# Patient Record
Sex: FEMALE | Race: WHITE | NOT HISPANIC OR LATINO | Employment: OTHER | URBAN - METROPOLITAN AREA
[De-identification: names, ages, dates, MRNs, and addresses within clinical notes are randomized per-mention and may not be internally consistent; named-entity substitution may affect disease eponyms.]

---

## 2017-09-20 ENCOUNTER — TRANSCRIBE ORDERS (OUTPATIENT)
Dept: ADMINISTRATIVE | Facility: HOSPITAL | Age: 72
End: 2017-09-20

## 2017-09-20 DIAGNOSIS — Z12.31 VISIT FOR SCREENING MAMMOGRAM: Primary | ICD-10-CM

## 2017-09-28 ENCOUNTER — HOSPITAL ENCOUNTER (INPATIENT)
Facility: HOSPITAL | Age: 72
LOS: 5 days | Discharge: HOME | End: 2017-10-03
Attending: EMERGENCY MEDICINE | Admitting: INTERNAL MEDICINE

## 2017-09-28 ENCOUNTER — APPOINTMENT (OUTPATIENT)
Dept: GENERAL RADIOLOGY | Facility: HOSPITAL | Age: 72
End: 2017-09-28

## 2017-09-28 DIAGNOSIS — A41.9 SEPSIS, DUE TO UNSPECIFIED ORGANISM: Primary | ICD-10-CM

## 2017-09-28 PROBLEM — R77.8 ELEVATED TROPONIN: Status: ACTIVE | Noted: 2017-09-28

## 2017-09-28 PROBLEM — N39.0 UTI (URINARY TRACT INFECTION): Status: ACTIVE | Noted: 2017-09-28

## 2017-09-28 PROBLEM — R79.89 ELEVATED TROPONIN: Status: ACTIVE | Noted: 2017-09-28

## 2017-09-28 PROBLEM — R65.20 SEVERE SEPSIS (HCC): Status: ACTIVE | Noted: 2017-09-28

## 2017-09-28 PROBLEM — E11.9 DIABETES MELLITUS (HCC): Status: ACTIVE | Noted: 2017-09-28

## 2017-09-28 LAB
ALBUMIN SERPL-MCNC: 4.2 G/DL (ref 3.2–4.8)
ALBUMIN/GLOB SERPL: 1.7 G/DL (ref 1.5–2.5)
ALP SERPL-CCNC: 56 U/L (ref 25–100)
ALT SERPL W P-5'-P-CCNC: 16 U/L (ref 7–40)
AMORPH URATE CRY URNS QL MICRO: ABNORMAL /HPF
ANION GAP SERPL CALCULATED.3IONS-SCNC: 7 MMOL/L (ref 3–11)
AST SERPL-CCNC: 21 U/L (ref 0–33)
BACTERIA UR QL AUTO: ABNORMAL /HPF
BASOPHILS # BLD AUTO: 0.01 10*3/MM3 (ref 0–0.2)
BASOPHILS NFR BLD AUTO: 0.1 % (ref 0–1)
BILIRUB SERPL-MCNC: 0.7 MG/DL (ref 0.3–1.2)
BILIRUB UR QL STRIP: NEGATIVE
BUN BLD-MCNC: 16 MG/DL (ref 9–23)
BUN/CREAT SERPL: 16 (ref 7–25)
CALCIUM SPEC-SCNC: 9.1 MG/DL (ref 8.7–10.4)
CHLORIDE SERPL-SCNC: 101 MMOL/L (ref 99–109)
CLARITY UR: ABNORMAL
CO2 SERPL-SCNC: 28 MMOL/L (ref 20–31)
COLOR UR: YELLOW
CREAT BLD-MCNC: 1 MG/DL (ref 0.6–1.3)
D-LACTATE SERPL-SCNC: 1.4 MMOL/L (ref 0.5–2)
DEPRECATED RDW RBC AUTO: 40.4 FL (ref 37–54)
EOSINOPHIL # BLD AUTO: 0 10*3/MM3 (ref 0–0.3)
EOSINOPHIL NFR BLD AUTO: 0 % (ref 0–3)
ERYTHROCYTE [DISTWIDTH] IN BLOOD BY AUTOMATED COUNT: 13 % (ref 11.3–14.5)
FLUAV AG NPH QL: NEGATIVE
FLUBV AG NPH QL IA: NEGATIVE
GFR SERPL CREATININE-BSD FRML MDRD: 55 ML/MIN/1.73
GLOBULIN UR ELPH-MCNC: 2.5 GM/DL
GLUCOSE BLD-MCNC: 200 MG/DL (ref 70–100)
GLUCOSE BLDC GLUCOMTR-MCNC: 266 MG/DL (ref 70–130)
GLUCOSE UR STRIP-MCNC: ABNORMAL MG/DL
HCT VFR BLD AUTO: 35.8 % (ref 34.5–44)
HGB BLD-MCNC: 12.2 G/DL (ref 11.5–15.5)
HGB UR QL STRIP.AUTO: ABNORMAL
HOLD SPECIMEN: NORMAL
HOLD SPECIMEN: NORMAL
HYALINE CASTS UR QL AUTO: ABNORMAL /LPF
IMM GRANULOCYTES # BLD: 0.03 10*3/MM3 (ref 0–0.03)
IMM GRANULOCYTES NFR BLD: 0.3 % (ref 0–0.6)
KETONES UR QL STRIP: ABNORMAL
LEUKOCYTE ESTERASE UR QL STRIP.AUTO: NEGATIVE
LIPASE SERPL-CCNC: 29 U/L (ref 6–51)
LYMPHOCYTES # BLD AUTO: 0.16 10*3/MM3 (ref 0.6–4.8)
LYMPHOCYTES NFR BLD AUTO: 1.4 % (ref 24–44)
MCH RBC QN AUTO: 29 PG (ref 27–31)
MCHC RBC AUTO-ENTMCNC: 34.1 G/DL (ref 32–36)
MCV RBC AUTO: 85 FL (ref 80–99)
MONOCYTES # BLD AUTO: 0.19 10*3/MM3 (ref 0–1)
MONOCYTES NFR BLD AUTO: 1.6 % (ref 0–12)
MUCOUS THREADS URNS QL MICRO: ABNORMAL /HPF
NEUTROPHILS # BLD AUTO: 11.39 10*3/MM3 (ref 1.5–8.3)
NEUTROPHILS NFR BLD AUTO: 96.6 % (ref 41–71)
NITRITE UR QL STRIP: NEGATIVE
PH UR STRIP.AUTO: 7.5 [PH] (ref 5–8)
PLATELET # BLD AUTO: 120 10*3/MM3 (ref 150–450)
PMV BLD AUTO: 10.2 FL (ref 6–12)
POTASSIUM BLD-SCNC: 3 MMOL/L (ref 3.5–5.5)
PROCALCITONIN SERPL-MCNC: 24.84 NG/ML
PROT SERPL-MCNC: 6.7 G/DL (ref 5.7–8.2)
PROT UR QL STRIP: ABNORMAL
RBC # BLD AUTO: 4.21 10*6/MM3 (ref 3.89–5.14)
RBC # UR: ABNORMAL /HPF
REF LAB TEST METHOD: ABNORMAL
SODIUM BLD-SCNC: 136 MMOL/L (ref 132–146)
SP GR UR STRIP: 1.01 (ref 1–1.03)
SQUAMOUS #/AREA URNS HPF: ABNORMAL /HPF
TROPONIN I SERPL-MCNC: 0.13 NG/ML (ref 0–0.07)
UROBILINOGEN UR QL STRIP: ABNORMAL
WBC NRBC COR # BLD: 11.78 10*3/MM3 (ref 3.5–10.8)
WBC UR QL AUTO: ABNORMAL /HPF
WHOLE BLOOD HOLD SPECIMEN: NORMAL
WHOLE BLOOD HOLD SPECIMEN: NORMAL

## 2017-09-28 PROCEDURE — 25010000002 VANCOMYCIN 10 G RECONSTITUTED SOLUTION: Performed by: EMERGENCY MEDICINE

## 2017-09-28 PROCEDURE — 25010000002 ONDANSETRON PER 1 MG: Performed by: EMERGENCY MEDICINE

## 2017-09-28 PROCEDURE — 93005 ELECTROCARDIOGRAM TRACING: CPT | Performed by: EMERGENCY MEDICINE

## 2017-09-28 PROCEDURE — 80053 COMPREHEN METABOLIC PANEL: CPT | Performed by: EMERGENCY MEDICINE

## 2017-09-28 PROCEDURE — 87086 URINE CULTURE/COLONY COUNT: CPT | Performed by: EMERGENCY MEDICINE

## 2017-09-28 PROCEDURE — 25010000002 KETOROLAC TROMETHAMINE PER 15 MG: Performed by: EMERGENCY MEDICINE

## 2017-09-28 PROCEDURE — 81001 URINALYSIS AUTO W/SCOPE: CPT | Performed by: EMERGENCY MEDICINE

## 2017-09-28 PROCEDURE — 83605 ASSAY OF LACTIC ACID: CPT | Performed by: EMERGENCY MEDICINE

## 2017-09-28 PROCEDURE — 87150 DNA/RNA AMPLIFIED PROBE: CPT | Performed by: EMERGENCY MEDICINE

## 2017-09-28 PROCEDURE — 25010000002 MORPHINE SULFATE (PF) 2 MG/ML SOLUTION: Performed by: EMERGENCY MEDICINE

## 2017-09-28 PROCEDURE — 99285 EMERGENCY DEPT VISIT HI MDM: CPT

## 2017-09-28 PROCEDURE — 99223 1ST HOSP IP/OBS HIGH 75: CPT | Performed by: HOSPITALIST

## 2017-09-28 PROCEDURE — 87077 CULTURE AEROBIC IDENTIFY: CPT | Performed by: EMERGENCY MEDICINE

## 2017-09-28 PROCEDURE — 25010000002 HEPARIN (PORCINE) PER 1000 UNITS: Performed by: HOSPITALIST

## 2017-09-28 PROCEDURE — 82962 GLUCOSE BLOOD TEST: CPT

## 2017-09-28 PROCEDURE — 25010000002 CEFTRIAXONE PER 250 MG: Performed by: EMERGENCY MEDICINE

## 2017-09-28 PROCEDURE — 87186 SC STD MICRODIL/AGAR DIL: CPT | Performed by: EMERGENCY MEDICINE

## 2017-09-28 PROCEDURE — 85025 COMPLETE CBC W/AUTO DIFF WBC: CPT | Performed by: EMERGENCY MEDICINE

## 2017-09-28 PROCEDURE — 63710000001 INSULIN LISPRO (HUMAN) PER 5 UNITS: Performed by: HOSPITALIST

## 2017-09-28 PROCEDURE — 87040 BLOOD CULTURE FOR BACTERIA: CPT | Performed by: EMERGENCY MEDICINE

## 2017-09-28 PROCEDURE — 84145 PROCALCITONIN (PCT): CPT | Performed by: EMERGENCY MEDICINE

## 2017-09-28 PROCEDURE — 71010 HC CHEST PA OR AP: CPT

## 2017-09-28 PROCEDURE — 87804 INFLUENZA ASSAY W/OPTIC: CPT | Performed by: EMERGENCY MEDICINE

## 2017-09-28 PROCEDURE — 83690 ASSAY OF LIPASE: CPT | Performed by: EMERGENCY MEDICINE

## 2017-09-28 PROCEDURE — 84484 ASSAY OF TROPONIN QUANT: CPT

## 2017-09-28 RX ORDER — CEFTRIAXONE SODIUM 1 G/50ML
1 INJECTION, SOLUTION INTRAVENOUS EVERY 24 HOURS
Status: DISCONTINUED | OUTPATIENT
Start: 2017-09-29 | End: 2017-09-29

## 2017-09-28 RX ORDER — CEFTRIAXONE SODIUM 1 G/50ML
1 INJECTION, SOLUTION INTRAVENOUS ONCE
Status: COMPLETED | OUTPATIENT
Start: 2017-09-28 | End: 2017-09-28

## 2017-09-28 RX ORDER — ACETAMINOPHEN 325 MG/1
650 TABLET ORAL ONCE
Status: COMPLETED | OUTPATIENT
Start: 2017-09-28 | End: 2017-09-28

## 2017-09-28 RX ORDER — KETOROLAC TROMETHAMINE 15 MG/ML
7.5 INJECTION, SOLUTION INTRAMUSCULAR; INTRAVENOUS ONCE
Status: COMPLETED | OUTPATIENT
Start: 2017-09-28 | End: 2017-09-28

## 2017-09-28 RX ORDER — SODIUM CHLORIDE 9 MG/ML
150 INJECTION, SOLUTION INTRAVENOUS CONTINUOUS
Status: DISCONTINUED | OUTPATIENT
Start: 2017-09-28 | End: 2017-09-28 | Stop reason: SDUPTHER

## 2017-09-28 RX ORDER — DEXTROSE MONOHYDRATE 25 G/50ML
25 INJECTION, SOLUTION INTRAVENOUS
Status: DISCONTINUED | OUTPATIENT
Start: 2017-09-28 | End: 2017-10-03 | Stop reason: HOSPADM

## 2017-09-28 RX ORDER — ACETAMINOPHEN 325 MG/1
650 TABLET ORAL EVERY 4 HOURS PRN
Status: DISCONTINUED | OUTPATIENT
Start: 2017-09-28 | End: 2017-10-03 | Stop reason: HOSPADM

## 2017-09-28 RX ORDER — SODIUM CHLORIDE 0.9 % (FLUSH) 0.9 %
10 SYRINGE (ML) INJECTION AS NEEDED
Status: DISCONTINUED | OUTPATIENT
Start: 2017-09-28 | End: 2017-10-03 | Stop reason: HOSPADM

## 2017-09-28 RX ORDER — SODIUM CHLORIDE 0.9 % (FLUSH) 0.9 %
1-10 SYRINGE (ML) INJECTION AS NEEDED
Status: DISCONTINUED | OUTPATIENT
Start: 2017-09-28 | End: 2017-10-03 | Stop reason: HOSPADM

## 2017-09-28 RX ORDER — ONDANSETRON 4 MG/1
4 TABLET, FILM COATED ORAL EVERY 6 HOURS PRN
Status: DISCONTINUED | OUTPATIENT
Start: 2017-09-28 | End: 2017-10-03 | Stop reason: HOSPADM

## 2017-09-28 RX ORDER — SODIUM PHOSPHATE,MONO-DIBASIC 19G-7G/118
1000 ENEMA (ML) RECTAL DAILY
COMMUNITY
End: 2022-03-16

## 2017-09-28 RX ORDER — VANCOMYCIN/0.9 % SOD CHLORIDE 1.5G/250ML
20 PLASTIC BAG, INJECTION (ML) INTRAVENOUS ONCE
Status: COMPLETED | OUTPATIENT
Start: 2017-09-28 | End: 2017-09-28

## 2017-09-28 RX ORDER — HYDROCODONE BITARTRATE AND ACETAMINOPHEN 5; 325 MG/1; MG/1
1 TABLET ORAL EVERY 6 HOURS PRN
Status: DISCONTINUED | OUTPATIENT
Start: 2017-09-28 | End: 2017-10-03 | Stop reason: HOSPADM

## 2017-09-28 RX ORDER — HEPARIN SODIUM 5000 [USP'U]/ML
5000 INJECTION, SOLUTION INTRAVENOUS; SUBCUTANEOUS EVERY 12 HOURS SCHEDULED
Status: DISCONTINUED | OUTPATIENT
Start: 2017-09-28 | End: 2017-09-29

## 2017-09-28 RX ORDER — NICOTINE POLACRILEX 4 MG
15 LOZENGE BUCCAL
Status: DISCONTINUED | OUTPATIENT
Start: 2017-09-28 | End: 2017-10-03 | Stop reason: HOSPADM

## 2017-09-28 RX ORDER — MORPHINE SULFATE 2 MG/ML
2 INJECTION, SOLUTION INTRAMUSCULAR; INTRAVENOUS ONCE
Status: COMPLETED | OUTPATIENT
Start: 2017-09-28 | End: 2017-09-28

## 2017-09-28 RX ORDER — LISINOPRIL 10 MG/1
10 TABLET ORAL EVERY MORNING
COMMUNITY
End: 2018-04-30 | Stop reason: ALTCHOICE

## 2017-09-28 RX ORDER — MORPHINE SULFATE 2 MG/ML
1 INJECTION, SOLUTION INTRAMUSCULAR; INTRAVENOUS EVERY 4 HOURS PRN
Status: DISCONTINUED | OUTPATIENT
Start: 2017-09-28 | End: 2017-10-03 | Stop reason: HOSPADM

## 2017-09-28 RX ORDER — ONDANSETRON 2 MG/ML
4 INJECTION INTRAMUSCULAR; INTRAVENOUS ONCE
Status: COMPLETED | OUTPATIENT
Start: 2017-09-28 | End: 2017-09-28

## 2017-09-28 RX ORDER — SODIUM CHLORIDE 9 MG/ML
100 INJECTION, SOLUTION INTRAVENOUS CONTINUOUS
Status: DISCONTINUED | OUTPATIENT
Start: 2017-09-28 | End: 2017-09-29

## 2017-09-28 RX ORDER — CHLORAL HYDRATE 500 MG
2000 CAPSULE ORAL
COMMUNITY

## 2017-09-28 RX ORDER — ONDANSETRON 2 MG/ML
4 INJECTION INTRAMUSCULAR; INTRAVENOUS EVERY 6 HOURS PRN
Status: DISCONTINUED | OUTPATIENT
Start: 2017-09-28 | End: 2017-10-03 | Stop reason: HOSPADM

## 2017-09-28 RX ADMIN — SODIUM CHLORIDE 1000 ML: 9 INJECTION, SOLUTION INTRAVENOUS at 16:48

## 2017-09-28 RX ADMIN — HEPARIN SODIUM 5000 UNITS: 5000 INJECTION, SOLUTION INTRAVENOUS; SUBCUTANEOUS at 21:54

## 2017-09-28 RX ADMIN — SODIUM CHLORIDE 150 ML/HR: 9 INJECTION, SOLUTION INTRAVENOUS at 19:52

## 2017-09-28 RX ADMIN — CEFTRIAXONE SODIUM 1 G: 1 INJECTION, SOLUTION INTRAVENOUS at 17:46

## 2017-09-28 RX ADMIN — MORPHINE SULFATE 2 MG: 2 INJECTION, SOLUTION INTRAMUSCULAR; INTRAVENOUS at 16:51

## 2017-09-28 RX ADMIN — ONDANSETRON 4 MG: 2 INJECTION INTRAMUSCULAR; INTRAVENOUS at 16:51

## 2017-09-28 RX ADMIN — KETOROLAC TROMETHAMINE 7.5 MG: 15 INJECTION, SOLUTION INTRAMUSCULAR; INTRAVENOUS at 16:48

## 2017-09-28 RX ADMIN — CEFTRIAXONE SODIUM 1 G: 1 INJECTION, SOLUTION INTRAVENOUS at 18:58

## 2017-09-28 RX ADMIN — VANCOMYCIN HYDROCHLORIDE 1500 MG: 10 INJECTION, POWDER, LYOPHILIZED, FOR SOLUTION INTRAVENOUS at 19:05

## 2017-09-28 RX ADMIN — HYDROCODONE BITARTRATE AND ACETAMINOPHEN 1 TABLET: 5; 325 TABLET ORAL at 23:27

## 2017-09-28 RX ADMIN — SODIUM CHLORIDE 100 ML/HR: 9 INJECTION, SOLUTION INTRAVENOUS at 21:50

## 2017-09-28 RX ADMIN — ACETAMINOPHEN 650 MG: 325 TABLET, FILM COATED ORAL at 17:13

## 2017-09-28 RX ADMIN — SODIUM CHLORIDE 1000 ML: 9 INJECTION, SOLUTION INTRAVENOUS at 18:58

## 2017-09-28 RX ADMIN — INSULIN LISPRO 4 UNITS: 100 INJECTION, SOLUTION INTRAVENOUS; SUBCUTANEOUS at 22:27

## 2017-09-29 ENCOUNTER — APPOINTMENT (OUTPATIENT)
Dept: CT IMAGING | Facility: HOSPITAL | Age: 72
End: 2017-09-29

## 2017-09-29 ENCOUNTER — APPOINTMENT (OUTPATIENT)
Dept: CARDIOLOGY | Facility: HOSPITAL | Age: 72
End: 2017-09-29
Attending: HOSPITALIST

## 2017-09-29 LAB
ALBUMIN SERPL-MCNC: 3.2 G/DL (ref 3.2–4.8)
ALBUMIN/GLOB SERPL: 1.5 G/DL (ref 1.5–2.5)
ALP SERPL-CCNC: 41 U/L (ref 25–100)
ALT SERPL W P-5'-P-CCNC: 16 U/L (ref 7–40)
ANION GAP SERPL CALCULATED.3IONS-SCNC: 8 MMOL/L (ref 3–11)
APTT PPP: 30 SECONDS (ref 45–60)
APTT PPP: 35.5 SECONDS (ref 45–60)
APTT PPP: 48 SECONDS (ref 45–60)
ARTICHOKE IGE QN: 68 MG/DL (ref 0–130)
AST SERPL-CCNC: 25 U/L (ref 0–33)
BACTERIA BLD CULT: ABNORMAL
BH CV ECHO MEAS - AO ROOT AREA (BSA CORRECTED): 1.6
BH CV ECHO MEAS - AO ROOT AREA: 6.8 CM^2
BH CV ECHO MEAS - AO ROOT DIAM: 2.9 CM
BH CV ECHO MEAS - BSA(HAYCOCK): 1.9 M^2
BH CV ECHO MEAS - BSA: 1.8 M^2
BH CV ECHO MEAS - BZI_BMI: 28 KILOGRAMS/M^2
BH CV ECHO MEAS - BZI_METRIC_HEIGHT: 165.1 CM
BH CV ECHO MEAS - BZI_METRIC_WEIGHT: 76.2 KG
BH CV ECHO MEAS - CONTRAST EF 4CH: 54.8 ML/M^2
BH CV ECHO MEAS - EDV(CUBED): 58.3 ML
BH CV ECHO MEAS - EDV(MOD-SP4): 93 ML
BH CV ECHO MEAS - EDV(TEICH): 65 ML
BH CV ECHO MEAS - EF(CUBED): 69.6 %
BH CV ECHO MEAS - EF(MOD-SP4): 55 %
BH CV ECHO MEAS - EF(TEICH): 61.9 %
BH CV ECHO MEAS - ESV(CUBED): 17.7 ML
BH CV ECHO MEAS - ESV(MOD-SP4): 42 ML
BH CV ECHO MEAS - ESV(TEICH): 24.8 ML
BH CV ECHO MEAS - FS: 32.7 %
BH CV ECHO MEAS - IVS/LVPW: 1.5
BH CV ECHO MEAS - IVSD: 1.6 CM
BH CV ECHO MEAS - LA DIMENSION: 3.3 CM
BH CV ECHO MEAS - LA/AO: 1.1
BH CV ECHO MEAS - LAT PEAK E' VEL: 6.7 CM/SEC
BH CV ECHO MEAS - LV DIASTOLIC VOL/BSA (35-75): 50.6 ML/M^2
BH CV ECHO MEAS - LV MASS(C)D: 185.7 GRAMS
BH CV ECHO MEAS - LV MASS(C)DI: 101.1 GRAMS/M^2
BH CV ECHO MEAS - LV MAX PG: 6.2 MMHG
BH CV ECHO MEAS - LV MEAN PG: 2.9 MMHG
BH CV ECHO MEAS - LV SYSTOLIC VOL/BSA (12-30): 22.9 ML/M^2
BH CV ECHO MEAS - LV V1 MAX: 124.4 CM/SEC
BH CV ECHO MEAS - LV V1 MEAN: 76.6 CM/SEC
BH CV ECHO MEAS - LV V1 VTI: 28.3 CM
BH CV ECHO MEAS - LVIDD: 3.9 CM
BH CV ECHO MEAS - LVIDS: 2.6 CM
BH CV ECHO MEAS - LVLD AP4: 7.7 CM
BH CV ECHO MEAS - LVLS AP4: 6.2 CM
BH CV ECHO MEAS - LVOT AREA (M): 2.5 CM^2
BH CV ECHO MEAS - LVOT AREA: 2.6 CM^2
BH CV ECHO MEAS - LVOT DIAM: 1.8 CM
BH CV ECHO MEAS - LVPWD: 1.1 CM
BH CV ECHO MEAS - MED PEAK E' VEL: 7.17 CM/SEC
BH CV ECHO MEAS - MV A MAX VEL: 94.3 CM/SEC
BH CV ECHO MEAS - MV E MAX VEL: 97.7 CM/SEC
BH CV ECHO MEAS - MV E/A: 1
BH CV ECHO MEAS - PA ACC SLOPE: 732.5 CM/SEC^2
BH CV ECHO MEAS - PA ACC TIME: 0.1 SEC
BH CV ECHO MEAS - PA PR(ACCEL): 35 MMHG
BH CV ECHO MEAS - RAP SYSTOLE: 15 MMHG
BH CV ECHO MEAS - RVDD: 2.7 CM
BH CV ECHO MEAS - RVSP: 38 MMHG
BH CV ECHO MEAS - SI(CUBED): 22.1 ML/M^2
BH CV ECHO MEAS - SI(LVOT): 39.9 ML/M^2
BH CV ECHO MEAS - SI(MOD-SP4): 27.8 ML/M^2
BH CV ECHO MEAS - SI(TEICH): 21.9 ML/M^2
BH CV ECHO MEAS - SV(CUBED): 40.6 ML
BH CV ECHO MEAS - SV(LVOT): 73.3 ML
BH CV ECHO MEAS - SV(MOD-SP4): 51 ML
BH CV ECHO MEAS - SV(TEICH): 40.2 ML
BH CV ECHO MEAS - TAPSE (>1.6): 1.6 CM2
BH CV ECHO MEAS - TR MAX VEL: 241.5 CM/SEC
BH CV VAS BP RIGHT ARM: NORMAL MMHG
BH CV XLRA - RV BASE: 3.3 CM
BH CV XLRA - RV LENGTH: 6.5 CM
BH CV XLRA - RV MID: 2.6 CM
BH CV XLRA - TDI S': 12.7 CM/SEC
BILIRUB SERPL-MCNC: 0.3 MG/DL (ref 0.3–1.2)
BUN BLD-MCNC: 17 MG/DL (ref 9–23)
BUN/CREAT SERPL: 15.5 (ref 7–25)
CALCIUM SPEC-SCNC: 7.4 MG/DL (ref 8.7–10.4)
CHLORIDE SERPL-SCNC: 108 MMOL/L (ref 99–109)
CHOLEST SERPL-MCNC: 152 MG/DL (ref 0–200)
CK MB SERPL-CCNC: 2.9 NG/ML (ref 0–5)
CK SERPL-CCNC: 114 U/L (ref 26–174)
CO2 SERPL-SCNC: 21 MMOL/L (ref 20–31)
CREAT BLD-MCNC: 1.1 MG/DL (ref 0.6–1.3)
D-LACTATE SERPL-SCNC: 0.9 MMOL/L (ref 0.5–2)
DEPRECATED RDW RBC AUTO: 44.1 FL (ref 37–54)
E/E' RATIO: 14
ERYTHROCYTE [DISTWIDTH] IN BLOOD BY AUTOMATED COUNT: 13.8 % (ref 11.3–14.5)
GFR SERPL CREATININE-BSD FRML MDRD: 49 ML/MIN/1.73
GLOBULIN UR ELPH-MCNC: 2.2 GM/DL
GLUCOSE BLD-MCNC: 200 MG/DL (ref 70–100)
GLUCOSE BLDC GLUCOMTR-MCNC: 145 MG/DL (ref 70–130)
GLUCOSE BLDC GLUCOMTR-MCNC: 165 MG/DL (ref 70–130)
GLUCOSE BLDC GLUCOMTR-MCNC: 191 MG/DL (ref 70–130)
GLUCOSE BLDC GLUCOMTR-MCNC: 193 MG/DL (ref 70–130)
HBA1C MFR BLD: 6.9 % (ref 4.8–5.6)
HCT VFR BLD AUTO: 29.8 % (ref 34.5–44)
HDLC SERPL-MCNC: 50 MG/DL (ref 40–60)
HGB BLD-MCNC: 9.9 G/DL (ref 11.5–15.5)
INR PPP: 1.07
LEFT ATRIUM VOLUME INDEX: 29.3 ML/M2
MCH RBC QN AUTO: 28.9 PG (ref 27–31)
MCHC RBC AUTO-ENTMCNC: 33.2 G/DL (ref 32–36)
MCV RBC AUTO: 86.9 FL (ref 80–99)
PLATELET # BLD AUTO: 108 10*3/MM3 (ref 150–450)
PMV BLD AUTO: 11 FL (ref 6–12)
POTASSIUM BLD-SCNC: 3.6 MMOL/L (ref 3.5–5.5)
PROT SERPL-MCNC: 5.4 G/DL (ref 5.7–8.2)
PROTHROMBIN TIME: 11.7 SECONDS (ref 9.6–11.5)
RBC # BLD AUTO: 3.43 10*6/MM3 (ref 3.89–5.14)
SODIUM BLD-SCNC: 137 MMOL/L (ref 132–146)
TRIGL SERPL-MCNC: 108 MG/DL (ref 0–150)
TROPONIN I SERPL-MCNC: 1.43 NG/ML
TROPONIN I SERPL-MCNC: 1.48 NG/ML
WBC NRBC COR # BLD: 8.63 10*3/MM3 (ref 3.5–10.8)

## 2017-09-29 PROCEDURE — 25010000003 CEFTRIAXONE PER 250 MG: Performed by: PHYSICIAN ASSISTANT

## 2017-09-29 PROCEDURE — 93010 ELECTROCARDIOGRAM REPORT: CPT | Performed by: INTERNAL MEDICINE

## 2017-09-29 PROCEDURE — 83605 ASSAY OF LACTIC ACID: CPT | Performed by: HOSPITALIST

## 2017-09-29 PROCEDURE — 93306 TTE W/DOPPLER COMPLETE: CPT | Performed by: INTERNAL MEDICINE

## 2017-09-29 PROCEDURE — 99222 1ST HOSP IP/OBS MODERATE 55: CPT | Performed by: INTERNAL MEDICINE

## 2017-09-29 PROCEDURE — 83036 HEMOGLOBIN GLYCOSYLATED A1C: CPT | Performed by: HOSPITALIST

## 2017-09-29 PROCEDURE — 84484 ASSAY OF TROPONIN QUANT: CPT | Performed by: PHYSICIAN ASSISTANT

## 2017-09-29 PROCEDURE — 80061 LIPID PANEL: CPT | Performed by: PHYSICIAN ASSISTANT

## 2017-09-29 PROCEDURE — 25010000002 HEPARIN (PORCINE) PER 1000 UNITS

## 2017-09-29 PROCEDURE — 99233 SBSQ HOSP IP/OBS HIGH 50: CPT | Performed by: HOSPITALIST

## 2017-09-29 PROCEDURE — 74176 CT ABD & PELVIS W/O CONTRAST: CPT

## 2017-09-29 PROCEDURE — 84484 ASSAY OF TROPONIN QUANT: CPT | Performed by: HOSPITALIST

## 2017-09-29 PROCEDURE — 82550 ASSAY OF CK (CPK): CPT | Performed by: HOSPITALIST

## 2017-09-29 PROCEDURE — 82553 CREATINE MB FRACTION: CPT | Performed by: HOSPITALIST

## 2017-09-29 PROCEDURE — 93306 TTE W/DOPPLER COMPLETE: CPT

## 2017-09-29 PROCEDURE — 80053 COMPREHEN METABOLIC PANEL: CPT | Performed by: HOSPITALIST

## 2017-09-29 PROCEDURE — 85730 THROMBOPLASTIN TIME PARTIAL: CPT

## 2017-09-29 PROCEDURE — 93005 ELECTROCARDIOGRAM TRACING: CPT | Performed by: HOSPITALIST

## 2017-09-29 PROCEDURE — 85610 PROTHROMBIN TIME: CPT

## 2017-09-29 PROCEDURE — 85027 COMPLETE CBC AUTOMATED: CPT | Performed by: HOSPITALIST

## 2017-09-29 PROCEDURE — 25010000002 MORPHINE SULFATE (PF) 2 MG/ML SOLUTION: Performed by: HOSPITALIST

## 2017-09-29 PROCEDURE — 82962 GLUCOSE BLOOD TEST: CPT

## 2017-09-29 RX ORDER — HEPARIN SODIUM 10000 [USP'U]/100ML
12 INJECTION, SOLUTION INTRAVENOUS
Status: DISCONTINUED | OUTPATIENT
Start: 2017-09-29 | End: 2017-09-30

## 2017-09-29 RX ORDER — HEPARIN SODIUM 1000 [USP'U]/ML
2000 INJECTION, SOLUTION INTRAVENOUS; SUBCUTANEOUS ONCE
Status: COMPLETED | OUTPATIENT
Start: 2017-09-29 | End: 2017-09-29

## 2017-09-29 RX ORDER — ASCORBIC ACID 500 MG
1000 TABLET ORAL DAILY
COMMUNITY
End: 2021-08-11

## 2017-09-29 RX ORDER — CEFTRIAXONE SODIUM 2 G/50ML
2 INJECTION, SOLUTION INTRAVENOUS EVERY 24 HOURS
Status: DISCONTINUED | OUTPATIENT
Start: 2017-09-29 | End: 2017-10-03 | Stop reason: HOSPADM

## 2017-09-29 RX ORDER — ASPIRIN 81 MG/1
81 TABLET ORAL EVERY MORNING
COMMUNITY

## 2017-09-29 RX ORDER — ASPIRIN 81 MG/1
324 TABLET, CHEWABLE ORAL ONCE
Status: COMPLETED | OUTPATIENT
Start: 2017-09-29 | End: 2017-09-29

## 2017-09-29 RX ORDER — CLOPIDOGREL BISULFATE 75 MG/1
600 TABLET ORAL ONCE
Status: COMPLETED | OUTPATIENT
Start: 2017-09-29 | End: 2017-09-29

## 2017-09-29 RX ORDER — MELOXICAM 7.5 MG/1
7.5 TABLET ORAL EVERY 12 HOURS
COMMUNITY
End: 2017-10-03 | Stop reason: HOSPADM

## 2017-09-29 RX ORDER — ESTRADIOL 1 MG/1
TABLET ORAL DAILY
COMMUNITY
End: 2017-10-03 | Stop reason: HOSPADM

## 2017-09-29 RX ORDER — ATORVASTATIN CALCIUM 40 MG/1
40 TABLET, FILM COATED ORAL NIGHTLY
Status: DISCONTINUED | OUTPATIENT
Start: 2017-09-29 | End: 2017-10-02

## 2017-09-29 RX ORDER — SODIUM CHLORIDE 9 MG/ML
150 INJECTION, SOLUTION INTRAVENOUS CONTINUOUS
Status: DISCONTINUED | OUTPATIENT
Start: 2017-09-29 | End: 2017-09-30

## 2017-09-29 RX ADMIN — INSULIN LISPRO 2 UNITS: 100 INJECTION, SOLUTION INTRAVENOUS; SUBCUTANEOUS at 19:38

## 2017-09-29 RX ADMIN — MORPHINE SULFATE 1 MG: 2 INJECTION, SOLUTION INTRAMUSCULAR; INTRAVENOUS at 21:29

## 2017-09-29 RX ADMIN — ATORVASTATIN CALCIUM 40 MG: 40 TABLET, FILM COATED ORAL at 19:38

## 2017-09-29 RX ADMIN — INSULIN LISPRO 2 UNITS: 100 INJECTION, SOLUTION INTRAVENOUS; SUBCUTANEOUS at 17:04

## 2017-09-29 RX ADMIN — HEPARIN SODIUM 2000 UNITS: 1000 INJECTION, SOLUTION INTRAVENOUS; SUBCUTANEOUS at 15:27

## 2017-09-29 RX ADMIN — SODIUM CHLORIDE 500 ML: 9 INJECTION, SOLUTION INTRAVENOUS at 10:45

## 2017-09-29 RX ADMIN — INSULIN LISPRO 2 UNITS: 100 INJECTION, SOLUTION INTRAVENOUS; SUBCUTANEOUS at 09:14

## 2017-09-29 RX ADMIN — ASPIRIN 81 MG 324 MG: 81 TABLET ORAL at 09:21

## 2017-09-29 RX ADMIN — HEPARIN SODIUM 12 UNITS/KG/HR: 10000 INJECTION, SOLUTION INTRAVENOUS at 08:33

## 2017-09-29 RX ADMIN — SODIUM CHLORIDE 1000 ML: 9 INJECTION, SOLUTION INTRAVENOUS at 01:16

## 2017-09-29 RX ADMIN — SODIUM CHLORIDE 150 ML/HR: 9 INJECTION, SOLUTION INTRAVENOUS at 22:37

## 2017-09-29 RX ADMIN — CLOPIDOGREL BISULFATE 600 MG: 75 TABLET ORAL at 09:21

## 2017-09-29 RX ADMIN — HYDROCODONE BITARTRATE AND ACETAMINOPHEN 1 TABLET: 5; 325 TABLET ORAL at 19:38

## 2017-09-29 RX ADMIN — CEFTRIAXONE SODIUM 2 G: 2 INJECTION, SOLUTION INTRAVENOUS at 13:33

## 2017-09-29 RX ADMIN — ACETAMINOPHEN 650 MG: 325 TABLET, FILM COATED ORAL at 08:33

## 2017-09-29 RX ADMIN — SODIUM CHLORIDE 150 ML/HR: 9 INJECTION, SOLUTION INTRAVENOUS at 02:22

## 2017-09-29 RX ADMIN — HYDROCODONE BITARTRATE AND ACETAMINOPHEN 1 TABLET: 5; 325 TABLET ORAL at 13:33

## 2017-09-29 RX ADMIN — HYDROCODONE BITARTRATE AND ACETAMINOPHEN 1 TABLET: 5; 325 TABLET ORAL at 05:40

## 2017-09-30 ENCOUNTER — APPOINTMENT (OUTPATIENT)
Dept: GENERAL RADIOLOGY | Facility: HOSPITAL | Age: 72
End: 2017-09-30

## 2017-09-30 ENCOUNTER — APPOINTMENT (OUTPATIENT)
Dept: CT IMAGING | Facility: HOSPITAL | Age: 72
End: 2017-09-30

## 2017-09-30 LAB
ANION GAP SERPL CALCULATED.3IONS-SCNC: 11 MMOL/L (ref 3–11)
APTT PPP: 47.5 SECONDS (ref 45–60)
BACTERIA SPEC AEROBE CULT: ABNORMAL
BACTERIA SPEC AEROBE CULT: ABNORMAL
BASOPHILS # BLD AUTO: 0.01 10*3/MM3 (ref 0–0.2)
BASOPHILS NFR BLD AUTO: 0.1 % (ref 0–1)
BNP SERPL-MCNC: 230 PG/ML (ref 0–100)
BUN BLD-MCNC: 15 MG/DL (ref 9–23)
BUN/CREAT SERPL: 18.8 (ref 7–25)
CALCIUM SPEC-SCNC: 8.3 MG/DL (ref 8.7–10.4)
CHLORIDE SERPL-SCNC: 104 MMOL/L (ref 99–109)
CO2 SERPL-SCNC: 22 MMOL/L (ref 20–31)
CREAT BLD-MCNC: 0.8 MG/DL (ref 0.6–1.3)
CRP SERPL-MCNC: 21.49 MG/DL (ref 0–1)
DEPRECATED RDW RBC AUTO: 43.6 FL (ref 37–54)
DEPRECATED RDW RBC AUTO: 43.8 FL (ref 37–54)
EOSINOPHIL # BLD AUTO: 0 10*3/MM3 (ref 0–0.3)
EOSINOPHIL NFR BLD AUTO: 0 % (ref 0–3)
ERYTHROCYTE [DISTWIDTH] IN BLOOD BY AUTOMATED COUNT: 13.8 % (ref 11.3–14.5)
ERYTHROCYTE [DISTWIDTH] IN BLOOD BY AUTOMATED COUNT: 13.9 % (ref 11.3–14.5)
ERYTHROCYTE [SEDIMENTATION RATE] IN BLOOD: 27 MM/HR (ref 0–30)
GFR SERPL CREATININE-BSD FRML MDRD: 71 ML/MIN/1.73
GLUCOSE BLD-MCNC: 162 MG/DL (ref 70–100)
GLUCOSE BLDC GLUCOMTR-MCNC: 158 MG/DL (ref 70–130)
GLUCOSE BLDC GLUCOMTR-MCNC: 160 MG/DL (ref 70–130)
GLUCOSE BLDC GLUCOMTR-MCNC: 168 MG/DL (ref 70–130)
GLUCOSE BLDC GLUCOMTR-MCNC: 173 MG/DL (ref 70–130)
HCT VFR BLD AUTO: 31.1 % (ref 34.5–44)
HCT VFR BLD AUTO: 34.4 % (ref 34.5–44)
HGB BLD-MCNC: 10.3 G/DL (ref 11.5–15.5)
HGB BLD-MCNC: 11.4 G/DL (ref 11.5–15.5)
IMM GRANULOCYTES # BLD: 0.03 10*3/MM3 (ref 0–0.03)
IMM GRANULOCYTES NFR BLD: 0.4 % (ref 0–0.6)
LYMPHOCYTES # BLD AUTO: 1.19 10*3/MM3 (ref 0.6–4.8)
LYMPHOCYTES NFR BLD AUTO: 14.9 % (ref 24–44)
MAGNESIUM SERPL-MCNC: 1.6 MG/DL (ref 1.3–2.7)
MCH RBC QN AUTO: 28.4 PG (ref 27–31)
MCH RBC QN AUTO: 28.4 PG (ref 27–31)
MCHC RBC AUTO-ENTMCNC: 33.1 G/DL (ref 32–36)
MCHC RBC AUTO-ENTMCNC: 33.1 G/DL (ref 32–36)
MCV RBC AUTO: 85.7 FL (ref 80–99)
MCV RBC AUTO: 85.8 FL (ref 80–99)
MONOCYTES # BLD AUTO: 0.54 10*3/MM3 (ref 0–1)
MONOCYTES NFR BLD AUTO: 6.7 % (ref 0–12)
NEUTROPHILS # BLD AUTO: 6.24 10*3/MM3 (ref 1.5–8.3)
NEUTROPHILS NFR BLD AUTO: 77.9 % (ref 41–71)
PLATELET # BLD AUTO: 105 10*3/MM3 (ref 150–450)
PLATELET # BLD AUTO: 91 10*3/MM3 (ref 150–450)
PMV BLD AUTO: 10.8 FL (ref 6–12)
PMV BLD AUTO: 11.4 FL (ref 6–12)
POTASSIUM BLD-SCNC: 3 MMOL/L (ref 3.5–5.5)
POTASSIUM BLD-SCNC: 3.5 MMOL/L (ref 3.5–5.5)
RBC # BLD AUTO: 3.63 10*6/MM3 (ref 3.89–5.14)
RBC # BLD AUTO: 4.01 10*6/MM3 (ref 3.89–5.14)
SODIUM BLD-SCNC: 137 MMOL/L (ref 132–146)
TROPONIN I SERPL-MCNC: 0.76 NG/ML
WBC NRBC COR # BLD: 11.68 10*3/MM3 (ref 3.5–10.8)
WBC NRBC COR # BLD: 8.01 10*3/MM3 (ref 3.5–10.8)

## 2017-09-30 PROCEDURE — 25010000003 CEFTRIAXONE PER 250 MG: Performed by: PHYSICIAN ASSISTANT

## 2017-09-30 PROCEDURE — 83735 ASSAY OF MAGNESIUM: CPT | Performed by: HOSPITALIST

## 2017-09-30 PROCEDURE — 83880 ASSAY OF NATRIURETIC PEPTIDE: CPT | Performed by: HOSPITALIST

## 2017-09-30 PROCEDURE — 99232 SBSQ HOSP IP/OBS MODERATE 35: CPT | Performed by: INTERNAL MEDICINE

## 2017-09-30 PROCEDURE — 80048 BASIC METABOLIC PNL TOTAL CA: CPT | Performed by: PHYSICIAN ASSISTANT

## 2017-09-30 PROCEDURE — 85730 THROMBOPLASTIN TIME PARTIAL: CPT | Performed by: HOSPITALIST

## 2017-09-30 PROCEDURE — 82962 GLUCOSE BLOOD TEST: CPT

## 2017-09-30 PROCEDURE — 84484 ASSAY OF TROPONIN QUANT: CPT | Performed by: HOSPITALIST

## 2017-09-30 PROCEDURE — 85025 COMPLETE CBC W/AUTO DIFF WBC: CPT

## 2017-09-30 PROCEDURE — 25010000002 MORPHINE SULFATE (PF) 2 MG/ML SOLUTION: Performed by: HOSPITALIST

## 2017-09-30 PROCEDURE — 84132 ASSAY OF SERUM POTASSIUM: CPT | Performed by: HOSPITALIST

## 2017-09-30 PROCEDURE — 85652 RBC SED RATE AUTOMATED: CPT | Performed by: PHYSICIAN ASSISTANT

## 2017-09-30 PROCEDURE — 99233 SBSQ HOSP IP/OBS HIGH 50: CPT | Performed by: HOSPITALIST

## 2017-09-30 PROCEDURE — 85027 COMPLETE CBC AUTOMATED: CPT | Performed by: HOSPITALIST

## 2017-09-30 PROCEDURE — 0 IOPAMIDOL PER 1 ML: Performed by: HOSPITALIST

## 2017-09-30 PROCEDURE — 25010000002 FUROSEMIDE PER 20 MG: Performed by: INTERNAL MEDICINE

## 2017-09-30 PROCEDURE — 71275 CT ANGIOGRAPHY CHEST: CPT

## 2017-09-30 PROCEDURE — 25010000002 FUROSEMIDE PER 20 MG: Performed by: HOSPITALIST

## 2017-09-30 PROCEDURE — 71010 HC CHEST PA OR AP: CPT

## 2017-09-30 PROCEDURE — 86140 C-REACTIVE PROTEIN: CPT | Performed by: PHYSICIAN ASSISTANT

## 2017-09-30 PROCEDURE — 25010000002 HEPARIN (PORCINE) PER 1000 UNITS: Performed by: HOSPITALIST

## 2017-09-30 RX ORDER — METOPROLOL TARTRATE 50 MG/1
50 TABLET, FILM COATED ORAL EVERY 12 HOURS SCHEDULED
Status: DISCONTINUED | OUTPATIENT
Start: 2017-09-30 | End: 2017-10-03 | Stop reason: HOSPADM

## 2017-09-30 RX ORDER — FUROSEMIDE 10 MG/ML
40 INJECTION INTRAMUSCULAR; INTRAVENOUS ONCE
Status: COMPLETED | OUTPATIENT
Start: 2017-09-30 | End: 2017-09-30

## 2017-09-30 RX ORDER — MAGNESIUM SULFATE HEPTAHYDRATE 40 MG/ML
4 INJECTION, SOLUTION INTRAVENOUS AS NEEDED
Status: DISCONTINUED | OUTPATIENT
Start: 2017-09-30 | End: 2017-10-03 | Stop reason: HOSPADM

## 2017-09-30 RX ORDER — MAGNESIUM SULFATE HEPTAHYDRATE 40 MG/ML
2 INJECTION, SOLUTION INTRAVENOUS AS NEEDED
Status: DISCONTINUED | OUTPATIENT
Start: 2017-09-30 | End: 2017-10-03 | Stop reason: HOSPADM

## 2017-09-30 RX ORDER — ASPIRIN 81 MG/1
81 TABLET ORAL DAILY
Status: DISCONTINUED | OUTPATIENT
Start: 2017-09-30 | End: 2017-10-03 | Stop reason: HOSPADM

## 2017-09-30 RX ORDER — FUROSEMIDE 10 MG/ML
20 INJECTION INTRAMUSCULAR; INTRAVENOUS EVERY 12 HOURS
Status: DISCONTINUED | OUTPATIENT
Start: 2017-09-30 | End: 2017-10-01

## 2017-09-30 RX ORDER — POTASSIUM CHLORIDE 750 MG/1
40 CAPSULE, EXTENDED RELEASE ORAL AS NEEDED
Status: DISCONTINUED | OUTPATIENT
Start: 2017-09-30 | End: 2017-10-03 | Stop reason: HOSPADM

## 2017-09-30 RX ORDER — POTASSIUM CHLORIDE 7.45 MG/ML
10 INJECTION INTRAVENOUS
Status: DISCONTINUED | OUTPATIENT
Start: 2017-09-30 | End: 2017-10-03 | Stop reason: HOSPADM

## 2017-09-30 RX ORDER — CLOPIDOGREL BISULFATE 75 MG/1
75 TABLET ORAL DAILY
Status: DISCONTINUED | OUTPATIENT
Start: 2017-09-30 | End: 2017-10-03 | Stop reason: HOSPADM

## 2017-09-30 RX ORDER — POTASSIUM CHLORIDE 1.5 G/1.77G
40 POWDER, FOR SOLUTION ORAL AS NEEDED
Status: DISCONTINUED | OUTPATIENT
Start: 2017-09-30 | End: 2017-10-03 | Stop reason: HOSPADM

## 2017-09-30 RX ADMIN — CLOPIDOGREL BISULFATE 75 MG: 75 TABLET ORAL at 11:28

## 2017-09-30 RX ADMIN — POTASSIUM CHLORIDE 40 MEQ: 750 CAPSULE, EXTENDED RELEASE ORAL at 19:05

## 2017-09-30 RX ADMIN — MORPHINE SULFATE 1 MG: 2 INJECTION, SOLUTION INTRAMUSCULAR; INTRAVENOUS at 22:02

## 2017-09-30 RX ADMIN — IOPAMIDOL 65 ML: 755 INJECTION, SOLUTION INTRAVENOUS at 13:30

## 2017-09-30 RX ADMIN — CEFTRIAXONE SODIUM 2 G: 2 INJECTION, SOLUTION INTRAVENOUS at 13:22

## 2017-09-30 RX ADMIN — INSULIN LISPRO 2 UNITS: 100 INJECTION, SOLUTION INTRAVENOUS; SUBCUTANEOUS at 08:43

## 2017-09-30 RX ADMIN — INSULIN LISPRO 2 UNITS: 100 INJECTION, SOLUTION INTRAVENOUS; SUBCUTANEOUS at 20:01

## 2017-09-30 RX ADMIN — POTASSIUM CHLORIDE 40 MEQ: 750 CAPSULE, EXTENDED RELEASE ORAL at 08:32

## 2017-09-30 RX ADMIN — MORPHINE SULFATE 1 MG: 2 INJECTION, SOLUTION INTRAMUSCULAR; INTRAVENOUS at 17:07

## 2017-09-30 RX ADMIN — METOPROLOL TARTRATE 50 MG: 50 TABLET ORAL at 20:01

## 2017-09-30 RX ADMIN — MAGNESIUM SULFATE HEPTAHYDRATE 4 G: 40 INJECTION, SOLUTION INTRAVENOUS at 15:18

## 2017-09-30 RX ADMIN — INSULIN LISPRO 2 UNITS: 100 INJECTION, SOLUTION INTRAVENOUS; SUBCUTANEOUS at 13:22

## 2017-09-30 RX ADMIN — POTASSIUM CHLORIDE 40 MEQ: 750 CAPSULE, EXTENDED RELEASE ORAL at 15:18

## 2017-09-30 RX ADMIN — MORPHINE SULFATE 1 MG: 2 INJECTION, SOLUTION INTRAMUSCULAR; INTRAVENOUS at 08:43

## 2017-09-30 RX ADMIN — INSULIN LISPRO 2 UNITS: 100 INJECTION, SOLUTION INTRAVENOUS; SUBCUTANEOUS at 17:08

## 2017-09-30 RX ADMIN — FUROSEMIDE 40 MG: 10 INJECTION, SOLUTION INTRAMUSCULAR; INTRAVENOUS at 05:12

## 2017-09-30 RX ADMIN — METOPROLOL TARTRATE 50 MG: 50 TABLET ORAL at 08:31

## 2017-09-30 RX ADMIN — ASPIRIN 81 MG: 81 TABLET, COATED ORAL at 11:28

## 2017-09-30 RX ADMIN — FUROSEMIDE 40 MG: 10 INJECTION, SOLUTION INTRAMUSCULAR; INTRAVENOUS at 14:42

## 2017-09-30 RX ADMIN — ATORVASTATIN CALCIUM 40 MG: 40 TABLET, FILM COATED ORAL at 20:01

## 2017-09-30 RX ADMIN — FUROSEMIDE 20 MG: 10 INJECTION, SOLUTION INTRAMUSCULAR; INTRAVENOUS at 19:43

## 2017-09-30 RX ADMIN — MORPHINE SULFATE 1 MG: 2 INJECTION, SOLUTION INTRAMUSCULAR; INTRAVENOUS at 04:07

## 2017-10-01 LAB
ANION GAP SERPL CALCULATED.3IONS-SCNC: 8 MMOL/L (ref 3–11)
BACTERIA SPEC AEROBE CULT: ABNORMAL
BACTERIA SPEC AEROBE CULT: ABNORMAL
BNP SERPL-MCNC: 258 PG/ML (ref 0–100)
BUN BLD-MCNC: 14 MG/DL (ref 9–23)
BUN/CREAT SERPL: 23.3 (ref 7–25)
CALCIUM SPEC-SCNC: 8 MG/DL (ref 8.7–10.4)
CHLORIDE SERPL-SCNC: 100 MMOL/L (ref 99–109)
CO2 SERPL-SCNC: 29 MMOL/L (ref 20–31)
CREAT BLD-MCNC: 0.6 MG/DL (ref 0.6–1.3)
DEPRECATED RDW RBC AUTO: 43.9 FL (ref 37–54)
ERYTHROCYTE [DISTWIDTH] IN BLOOD BY AUTOMATED COUNT: 13.9 % (ref 11.3–14.5)
GFR SERPL CREATININE-BSD FRML MDRD: 98 ML/MIN/1.73
GLUCOSE BLD-MCNC: 140 MG/DL (ref 70–100)
GLUCOSE BLDC GLUCOMTR-MCNC: 126 MG/DL (ref 70–130)
GLUCOSE BLDC GLUCOMTR-MCNC: 131 MG/DL (ref 70–130)
GLUCOSE BLDC GLUCOMTR-MCNC: 180 MG/DL (ref 70–130)
GLUCOSE BLDC GLUCOMTR-MCNC: 202 MG/DL (ref 70–130)
GRAM STN SPEC: ABNORMAL
GRAM STN SPEC: ABNORMAL
HCT VFR BLD AUTO: 29.8 % (ref 34.5–44)
HGB BLD-MCNC: 9.9 G/DL (ref 11.5–15.5)
ISOLATED FROM: ABNORMAL
MAGNESIUM SERPL-MCNC: 2 MG/DL (ref 1.3–2.7)
MCH RBC QN AUTO: 28.5 PG (ref 27–31)
MCHC RBC AUTO-ENTMCNC: 33.2 G/DL (ref 32–36)
MCV RBC AUTO: 85.9 FL (ref 80–99)
PHOSPHATE SERPL-MCNC: 1.6 MG/DL (ref 2.4–5.1)
PLATELET # BLD AUTO: 98 10*3/MM3 (ref 150–450)
PMV BLD AUTO: 10.6 FL (ref 6–12)
POTASSIUM BLD-SCNC: 3.3 MMOL/L (ref 3.5–5.5)
RBC # BLD AUTO: 3.47 10*6/MM3 (ref 3.89–5.14)
SODIUM BLD-SCNC: 137 MMOL/L (ref 132–146)
TROPONIN I SERPL-MCNC: 0.35 NG/ML
WBC NRBC COR # BLD: 7.5 10*3/MM3 (ref 3.5–10.8)

## 2017-10-01 PROCEDURE — 83735 ASSAY OF MAGNESIUM: CPT | Performed by: HOSPITALIST

## 2017-10-01 PROCEDURE — 25010000002 FUROSEMIDE PER 20 MG: Performed by: HOSPITALIST

## 2017-10-01 PROCEDURE — 25010000003 CEFTRIAXONE PER 250 MG: Performed by: PHYSICIAN ASSISTANT

## 2017-10-01 PROCEDURE — 84100 ASSAY OF PHOSPHORUS: CPT | Performed by: HOSPITALIST

## 2017-10-01 PROCEDURE — 85027 COMPLETE CBC AUTOMATED: CPT | Performed by: HOSPITALIST

## 2017-10-01 PROCEDURE — 99233 SBSQ HOSP IP/OBS HIGH 50: CPT | Performed by: HOSPITALIST

## 2017-10-01 PROCEDURE — 25010000002 HEPARIN (PORCINE) PER 1000 UNITS: Performed by: HOSPITALIST

## 2017-10-01 PROCEDURE — 25010000002 MORPHINE SULFATE (PF) 2 MG/ML SOLUTION: Performed by: HOSPITALIST

## 2017-10-01 PROCEDURE — 82962 GLUCOSE BLOOD TEST: CPT

## 2017-10-01 PROCEDURE — 84484 ASSAY OF TROPONIN QUANT: CPT | Performed by: PHYSICIAN ASSISTANT

## 2017-10-01 PROCEDURE — 99232 SBSQ HOSP IP/OBS MODERATE 35: CPT | Performed by: INTERNAL MEDICINE

## 2017-10-01 PROCEDURE — 80048 BASIC METABOLIC PNL TOTAL CA: CPT | Performed by: HOSPITALIST

## 2017-10-01 PROCEDURE — 83880 ASSAY OF NATRIURETIC PEPTIDE: CPT | Performed by: HOSPITALIST

## 2017-10-01 PROCEDURE — 25010000002 FUROSEMIDE PER 20 MG: Performed by: INTERNAL MEDICINE

## 2017-10-01 RX ORDER — HEPARIN SODIUM 5000 [USP'U]/ML
5000 INJECTION, SOLUTION INTRAVENOUS; SUBCUTANEOUS EVERY 8 HOURS SCHEDULED
Status: DISCONTINUED | OUTPATIENT
Start: 2017-10-01 | End: 2017-10-03 | Stop reason: HOSPADM

## 2017-10-01 RX ORDER — FUROSEMIDE 10 MG/ML
20 INJECTION INTRAMUSCULAR; INTRAVENOUS 2 TIMES DAILY
Status: COMPLETED | OUTPATIENT
Start: 2017-10-01 | End: 2017-10-01

## 2017-10-01 RX ORDER — LISINOPRIL 5 MG/1
2.5 TABLET ORAL
Status: DISCONTINUED | OUTPATIENT
Start: 2017-10-02 | End: 2017-10-03

## 2017-10-01 RX ORDER — POTASSIUM CHLORIDE 750 MG/1
40 CAPSULE, EXTENDED RELEASE ORAL ONCE
Status: COMPLETED | OUTPATIENT
Start: 2017-10-01 | End: 2017-10-01

## 2017-10-01 RX ADMIN — INSULIN LISPRO 3 UNITS: 100 INJECTION, SOLUTION INTRAVENOUS; SUBCUTANEOUS at 12:15

## 2017-10-01 RX ADMIN — HYDROCODONE BITARTRATE AND ACETAMINOPHEN 1 TABLET: 5; 325 TABLET ORAL at 11:05

## 2017-10-01 RX ADMIN — FUROSEMIDE 20 MG: 10 INJECTION, SOLUTION INTRAMUSCULAR; INTRAVENOUS at 08:36

## 2017-10-01 RX ADMIN — MORPHINE SULFATE 1 MG: 2 INJECTION, SOLUTION INTRAMUSCULAR; INTRAVENOUS at 21:45

## 2017-10-01 RX ADMIN — HEPARIN SODIUM 5000 UNITS: 5000 INJECTION, SOLUTION INTRAVENOUS; SUBCUTANEOUS at 21:31

## 2017-10-01 RX ADMIN — CLOPIDOGREL BISULFATE 75 MG: 75 TABLET ORAL at 08:36

## 2017-10-01 RX ADMIN — HYDROCODONE BITARTRATE AND ACETAMINOPHEN 1 TABLET: 5; 325 TABLET ORAL at 19:32

## 2017-10-01 RX ADMIN — INSULIN LISPRO 2 UNITS: 100 INJECTION, SOLUTION INTRAVENOUS; SUBCUTANEOUS at 17:02

## 2017-10-01 RX ADMIN — FUROSEMIDE 20 MG: 10 INJECTION, SOLUTION INTRAMUSCULAR; INTRAVENOUS at 17:02

## 2017-10-01 RX ADMIN — ASPIRIN 81 MG: 81 TABLET, COATED ORAL at 08:36

## 2017-10-01 RX ADMIN — POTASSIUM & SODIUM PHOSPHATES POWDER PACK 280-160-250 MG 2 PACKET: 280-160-250 PACK at 18:08

## 2017-10-01 RX ADMIN — CEFTRIAXONE SODIUM 2 G: 2 INJECTION, SOLUTION INTRAVENOUS at 12:15

## 2017-10-01 RX ADMIN — POTASSIUM & SODIUM PHOSPHATES POWDER PACK 280-160-250 MG 2 PACKET: 280-160-250 PACK at 11:02

## 2017-10-01 RX ADMIN — METOPROLOL TARTRATE 50 MG: 50 TABLET ORAL at 08:36

## 2017-10-01 RX ADMIN — ATORVASTATIN CALCIUM 40 MG: 40 TABLET, FILM COATED ORAL at 21:32

## 2017-10-01 RX ADMIN — METOPROLOL TARTRATE 50 MG: 50 TABLET ORAL at 21:32

## 2017-10-01 RX ADMIN — POTASSIUM CHLORIDE 40 MEQ: 750 CAPSULE, EXTENDED RELEASE ORAL at 11:01

## 2017-10-01 RX ADMIN — HEPARIN SODIUM 5000 UNITS: 5000 INJECTION, SOLUTION INTRAVENOUS; SUBCUTANEOUS at 14:11

## 2017-10-01 NOTE — PROGRESS NOTES
"[facility] Progress Note    Date of Admission: 2017      Antibiotics:  Ceftriaxone    CC:   Chief Complaint   Patient presents with   • Vomiting   fever/chills    S: No fever. Some hypothermia. Cardiology recommending L heart cath with heart rhythm changes.     O:  /78 (BP Location: Left arm, Patient Position: Lying)  Pulse 71  Temp 98.4 °F (36.9 °C) (Oral)   Resp 17  Ht 65.5\" (166.4 cm)  Wt 169 lb 6.4 oz (76.8 kg)  SpO2 94%  BMI 27.76 kg/m2  Temp (24hrs), Av.8 °F (36 °C), Min:93.5 °F (34.2 °C), Max:98.4 °F (36.9 °C)      PE:   GEN: Awake and alert, in no acute distress. Improved appearance  HEENT: NCAT.  EOMI. No conjunctival injection. No icterus. Oropharynx clear without evidence of thrush or exudate.   NECK: Supple without nuchal rigidity  HEART: Tachy and Irregular; No murmur, rubs, gallops.   LUNGS: Clear to auscultation bilaterally without wheezing, rales, rhonchi. Normal respiratory effort.  ABDOMEN: Soft, nontender, nondistended. Positive bowel sounds. No rebound or guarding.   EXT:  No cyanosis, clubbing or edema  : Normal appearing genitalia without Colin catheter.  MSK: FROM without joint effusions noted    SKIN: Warm and dry without cutaneous eruptions.    NEURO: Oriented to PPT. No focal deficits.     Laboratory Data      Results from last 7 days  Lab Units 10/01/17  0443 17  2252 17  0541   WBC 10*3/mm3 7.50 8.01 11.68*   HEMOGLOBIN g/dL 9.9* 10.3* 11.4*   HEMATOCRIT % 29.8* 31.1* 34.4*   PLATELETS 10*3/mm3 98* 91* 105*       Results from last 7 days  Lab Units 10/01/17  0443   SODIUM mmol/L 137   POTASSIUM mmol/L 3.3*   CHLORIDE mmol/L 100   CO2 mmol/L 29.0   BUN mg/dL 14   CREATININE mg/dL 0.60   GLUCOSE mg/dL 140*   CALCIUM mg/dL 8.0*       Results from last 7 days  Lab Units 17  0356   ALK PHOS U/L 41   BILIRUBIN mg/dL 0.3   ALT (SGPT) U/L 16   AST (SGOT) U/L 25       Results from last 7 days  Lab Units 17  0541   SED RATE mm/hr 27       Results from last " 7 days  Lab Units 09/30/17  0541   CRP mg/dL 21.49*       Estimated Creatinine Clearance: 65.8 mL/min (by C-G formula based on Cr of 0.6).      Microbiology:  The urine with pan susceptible Escherichia coli  Blood cultures 2 out of 2 with Escherichia coli    Radiology:  Imaging Results (last 24 hours)     Procedure Component Value Units Date/Time    CT Angiogram Chest With & Without Contrast [[id number]] Collected:  09/30/17 1309     Updated:  09/30/17 1803    Narrative:       EXAMINATION: CT ANGIOGRAM CHEST W/WO CONTRAST - 09/30/2017     INDICATION:  A41.9-Sepsis, unspecified organism.     TECHNIQUE: CT angiogram chest with and without intravenous contrast  administration.     The radiation dose reduction device was turned on for each scan per the  ALARA (As Low as Reasonably Achievable) protocol.     COMPARISON: CT abdomen and pelvis 09/29/2017, chest x-ray earlier same  day.     FINDINGS: Thyroid is homogeneous in attenuation. No bulky mediastinal  adenopathy with calcified right hilar lymph nodes consistent with prior  granulomatous involvement. Central airways are patent. Cardiac size  within normal limits without pericardial effusion. Satisfactory  opacification of the pulmonary arterial tree without filling defect to  suggest pulmonary embolus. Extended lung windows demonstrate multifocal  airspace opacifications involving the bilateral upper lobes consistent  with acute infiltrates. Bilateral small volume pleural effusions with  adjacent atelectasis and minimal fluid in the fissures. Multilevel  degenerative changes of the spine without aggressive osseous lesion.     Visualized portions of the upper abdomen demonstrate contrast reflux  into the hepatic veins concerning for right heart pathology. Post  cholecystectomy.       Impression:       1. No filling defect within the pulmonary arterial tree to suggest  pulmonary embolus.  2. Diffuse bilateral pulmonary opacifications consistent with  acute  infiltrates.  3. Bilateral pleural effusions with adjacent atelectasis.  4. Reflux of contrast into the hepatic veins noted on arterial phase  imaging suggestive of right heart pathology.     DICTATED:     09/30/2017  EDITED:         09/30/2017        This report was finalized on 9/30/2017 6:01 PM by Dr. Hector Reich.           PROBLEM LIST:   Sepsis present on admission  E coli bacteremia of urinary source   Escherichia coli UTI  Pulmonary edema  SVT  Fever  Leukocytosis  Elevated Procalcitonin  Elevater Troponins- secondary to sepsis  Anemia  Acute thrombocytopenia   T2 diabetes mellitus  Hypertension  Hypokalemia     ASSESSMENT:  Patient is a 72-year-old female admitted secondary to sepsis with fever, tachycardia, leukocytosis at the time of ED evaluation with blood cultures x 2 positive for E.coli on 2/2 sets.  Her urinalysis has 20-30 white blood cells and 4+ bacteria for urinary source of infection the patient denies dysuria, hematuria, urinary frequency pressure.  This time, recommend CT scan of the abdomen and pelvis without contrast for evaluation of the GI and  systems.    Sepsis improving on ceftriaxone but has developed some pulmonary edema and SVT cardiology monitoring and recommending [facility]- potentially as an outpatient. From infection standpoint, currently on Rocephin with improvement.     PLAN:  Rocephin 2 g IV daily  Candidate for daily infusions at [facility] upon discharge once electrolytes, heart rate and breathing improved  If heart cath not urgent, agree with [facility] as an outpatient.   Potassium still requiring replacement and will plan on [facility] daily infusions at the time of discharge.     Dr. Chaz Landon saw the patient, performed the physical exam, reviewed the laboratory data and guided with the formulation of the above problem list, assessment and treatment plan.       Chaz Landon MD  10/1/2017

## 2017-10-01 NOTE — PLAN OF CARE
Problem: Patient Care Overview (Adult)  Goal: Plan of Care Review  Outcome: Ongoing (interventions implemented as appropriate)    10/01/17 8302   Coping/Psychosocial Response Interventions   Plan Of Care Reviewed With patient   Patient Care Overview   Progress improving   Outcome Evaluation   Outcome Summary/Follow up Plan pt. is feeling better. She is smiling and interacting with her family and asks frequently if she can go home. Pt.''s oxygenation is better. She is now on RA without any c/o SOA and O2 sats are > 91%         Problem: Sepsis (Adult)  Goal: Signs and Symptoms of Listed Potential Problems Will be Absent or Manageable (Sepsis)  Outcome: Ongoing (interventions implemented as appropriate)

## 2017-10-01 NOTE — PROGRESS NOTES
[facility] at [facility]    Inpatient Progress Note      Chief Complaint/Reason for service:    · Elevated troponin         Subjective:       No chest pain. Mildly labored breathing with walking. Improved with rest and maybe some with Lasix. No palpitations, swelling.     Past medical, surgical, social and family history reviewed in the patient's electronic medical record.    Review of Systems:   Positive for dyspnea  Negative for exertional chest pain, orthopnea, PND, lower extremity edema, palpitations, lightheadedness, syncope.     Problem List  Active Hospital Problems (** Indicates Principal Problem)    Diagnosis Date Noted   • **Severe sepsis [A41.9, R65.20] 09/28/2017   • UTI (urinary tract infection) [N39.0] 09/28/2017   • Diabetes mellitus [E11.9] 09/28/2017   • Elevated troponin [R74.8] 09/28/2017      Resolved Hospital Problems    Diagnosis Date Noted Date Resolved   No resolved problems to display.            Objective:      Current Facility-Administered Medications:   •  acetaminophen (TYLENOL) tablet 650 mg, 650 mg, Oral, Q4H PRN, Tangela CLAIRE MD, 650 mg at 09/29/17 0833  •  aspirin EC tablet 81 mg, 81 mg, Oral, Daily, Romario Walker MD, 81 mg at 10/01/17 0836  •  atorvastatin (LIPITOR) tablet 40 mg, 40 mg, Oral, Nightly, SAMIRA Knowles, 40 mg at 09/30/17 2001  •  cefTRIAXone (ROCEPHIN) IVPB 2 g, 2 g, Intravenous, Q24H, SAMIRA Orta, 2 g at 09/30/17 1322  •  clopidogrel (PLAVIX) tablet 75 mg, 75 mg, Oral, Daily, Romario Walker MD, 75 mg at 10/01/17 0836  •  dextrose (D50W) solution 25 g, 25 g, Intravenous, Q15 Min PRN, Tangela CLAIRE MD  •  dextrose (GLUTOSE) oral gel 15 g, 15 g, Oral, Q15 Min PRN, Tangela CLAIRE MD  •  furosemide (LASIX) injection 20 mg, 20 mg, Intravenous, BID, Oral Velazco MD  •  glucagon (GLUCAGEN) injection 1 mg, 1 mg, Subcutaneous, Q15 Min PRN, Tangela CLAIRE MD  •  HYDROcodone-acetaminophen (NORCO) 5-325 MG per tablet 1 tablet, 1  tablet, Oral, Q6H PRN, Tangela CLAIRE MD, 1 tablet at 09/29/17 1938  •  influenza vac split quad (FLUZONE,FLUARIX,AFLURIA) injection 0.5 mL, 0.5 mL, Intramuscular, During Hospitalization, Tangela CLAIRE MD  •  insulin lispro (humaLOG) injection 0-7 Units, 0-7 Units, Subcutaneous, 4x Daily AC & at Bedtime, Tangela CLAIRE MD, 2 Units at 09/30/17 2001  •  [START ON 10/2/2017] lisinopril (PRINIVIL,ZESTRIL) tablet 2.5 mg, 2.5 mg, Oral, Q24H, Romario Walker MD  •  Magnesium Sulfate 2 gram Bolus, followed by 8 gram infusion (total Mg dose 10 grams)- Mg less than or equal to 1mg/dL, 2 g, Intravenous, PRN **OR** Magnesium Sulfate 6 gram Infusion (2 gm x 3) -Mg 1.1 -1.5 mg/dL, 2 g, Intravenous, PRN **OR** magnesium sulfate 4 gram infusion- Mg 1.6-1.9 mg/dL, 4 g, Intravenous, PRN, Romario Walker MD, Last Rate: 25 mL/hr at 09/30/17 1518, 4 g at 09/30/17 1518  •  metoprolol tartrate (LOPRESSOR) tablet 50 mg, 50 mg, Oral, Q12H, Oral Velazco MD, 50 mg at 10/01/17 0836  •  Morphine sulfate (PF) injection 1 mg, 1 mg, Intravenous, Q4H PRN, Tangela CLAIRE MD, 1 mg at 09/30/17 2202  •  ondansetron (ZOFRAN) tablet 4 mg, 4 mg, Oral, Q6H PRN **OR** ondansetron (ZOFRAN) injection 4 mg, 4 mg, Intravenous, Q6H PRN, Tangela CLAIRE MD  •  pneumococcal polysaccharide 23-valent (PNEUMOVAX-23) vaccine 0.5 mL, 0.5 mL, Intramuscular, During Hospitalization, Tangela CLAIRE MD  •  potassium & sodium phosphates (PHOS-NAK) 280-160-250 MG packet - for Phosphorus less than 1.25 mg/dL, 1 packet, Oral, Q6H PRN **OR** potassium & sodium phosphates (PHOS-NAK) 280-160-250 MG packet - for Phosphorus 1.25 - 2.1 mg/dL, 1 packet, Oral, Once PRN, Romario Walker MD  •  potassium chloride (MICRO-K) CR capsule 40 mEq, 40 mEq, Oral, PRN, 40 mEq at 09/30/17 1905 **OR** potassium chloride (KLOR-CON) packet 40 mEq, 40 mEq, Oral, PRN **OR** potassium chloride 10 mEq in 100 mL IVPB, 10 mEq, Intravenous, Q1H PRN, Oral Velazco MD  •  potassium chloride (MICRO-K) CR  capsule 40 mEq, 40 mEq, Oral, Once, Romario Walker MD  •  sodium chloride 0.9 % flush 1-10 mL, 1-10 mL, Intravenous, PRN, Tangela CLAIRE MD  •  sodium chloride 0.9 % flush 10 mL, 10 mL, Intravenous, PRN, Johnathan Lazaro MD      Vital Sign Min/Max for last 24 hours  Temp  Min: 93.5 °F (34.2 °C)  Max: 99.3 °F (37.4 °C)   BP  Min: 108/67  Max: 141/83   Pulse  Min: 71  Max: 78   Resp  Min: 17  Max: 18   SpO2  Min: 94 %  Max: 97 %   Flow (L/min)  Min: 3  Max: 6      Intake/Output Summary (Last 24 hours) at 10/01/17 0943  Last data filed at 10/01/17 0300   Gross per 24 hour   Intake              320 ml   Output             1350 ml   Net            -1030 ml           CONSTITUTIONAL: No acute distress, normal affect  RESPIRATORY: Normal effort. Clear to auscultation bilaterally without wheezing; left basilar rales  CARDIOVASCULAR: Regular rate and rhythm with normal S1 and S2. Without murmur, gallop or rub.  PERIPHERAL VASCULAR: Normal radial pulses bilaterally. There is no peripheral edema bilaterally.    Results Review:   I reviewed the patient's recent labs in the electronic medical record.      Tele:  NSR with no significant SVT overnight  CXR: Reviewed - pulmonary congestion  TTE:  · Left ventricular systolic function is normal. Estimated EF appears to be in the range of 56 - 60%.  · Left ventricular wall thickness is consistent with mild septal asymmetric hypertrophy.  · Left ventricular diastolic dysfunction (grade II) consistent with pseudonormalization.  · Mild tricuspid valve regurgitation is present.  · Estimated right ventricular systolic pressure from tricuspid regurgitation is mildly elevated (35-45 mmHg).       Assessment/Plan:       ASSESSMENT:  1. Elevated Troponin in setting of sepsis and hypotension; likely demand ischemia. No chest pain or concerning anginal equivalents currently or in recent history. Has risk factors for CAD. S/p 48 hours Heparin  2. Hypotension: Improved  3. Sepsis/Bactremia/UTI-  ID following  4. DM  5. CKD Stage III, Cr currently 0.8  6. Anemia  7. Intermittent SVT - new as of 9/30 AM; normalized after Lasix and beta blocker; no further significant SVT noted 10/1  8. Pulmonary congestion/Acute diastolic HF - noted on exam and CXR 9/30; likely exacerbated by underlying infection, fluid administration and underlying diastolic HF     PLAN:  · DAPT for now  · Lopressor 50mg BID  · Statin  · Lasix 20mg IVP one more time today, potassium replacement; check BMP in AM  · With diastolic HF exacerbation, intermittent SVT, troponin elevation and solicited history today of worsening exercise tolerance and exertional dyspnea in the recent past (which could be an anginal equivalent), will pursue LHC. Procedure would be non-urgent/elective. Scheduling pending infectious course; possibly as an outpatient    Oral Velazco MD, MSc, [facility]  Interventional Cardiology  [facility]  10/1/2017

## 2017-10-01 NOTE — PROGRESS NOTES
Hospitalist Daily Progress Note    Date of Admission: 9/28/2017   LOS: 3 days   PCP: Daron Dotson MD    Chief Complaint: vomiting / sepsis (POA)    Subjective      Oxygen requirements a bit lower today.  No chest pain.  No shortness of breath.  Off Heparin drip today.  No evidence of PE (consider stopping hormonals as outpatient)    Vomiting          Review of Systems   Gastrointestinal: Positive for vomiting.     General: no fevers, + chills  Respiratory: no cough, dyspnea  Cardiovascular: no chest pain, palpitations  Abdomen: No abdominal pain, nausea, vomiting, or diarrhea  Neurologic: No focal weakness    Objective   Physical Exam:  I have reviewed the vital signs.  Temp:  [93.5 °F (34.2 °C)-99.3 °F (37.4 °C)] 98.4 °F (36.9 °C)  Heart Rate:  [71-78] 71  Resp:  [17-18] 17  BP: (108-141)/(67-86) 124/78    Objective  General Appearance:    Alert, cooperative, no distress  Head:    Normocephalic, atraumatic  Eyes:    Sclerae anicteric  Neck:   Supple, no JVD  Lungs: poor inspiratory effort, diminished bases  Heart: Regular rate and rhythm, S1 and S2 normal  Abdomen:  Soft, non-tender, bowel sounds active, nondistended  Extremities: No clubbing, cyanosis, or edema to lower extremities  Pulses:  2+ and symmetric in distal lower extremities  Skin: dry, + skin tenting  Neurologic: Oriented x3, Normal strength to extremities    Results Review:    I have reviewed the labs, radiology results and diagnostic studies.      Results from last 7 days  Lab Units 10/01/17  0443   WBC 10*3/mm3 7.50   HEMOGLOBIN g/dL 9.9*   PLATELETS 10*3/mm3 98*       Results from last 7 days  Lab Units 10/01/17  0443   SODIUM mmol/L 137   POTASSIUM mmol/L 3.3*   CO2 mmol/L 29.0   CREATININE mg/dL 0.60   GLUCOSE mg/dL 140*       I have reviewed the medications.    ---------------------------------------------------------------------------------------------  Assessment/Plan   Assessment & Plan  Assessment/Problem List    Principal Problem:     Severe sepsis  Active Problems:    UTI (urinary tract infection)    Diabetes mellitus    Elevated troponin    72 year old female admitted with sepsis POA.     Plan    Sepsis POA  - e coli in blood and urine  - IV fluids / abx  - elevated procalcitonin  - ID following  Acute Hypoxia  - less oxygen requirements today, on 3L/min by NC today  - CTA to rule out PE - patient is on estradiol, testosterone and morbidly obese  Bacteremia  - e coli in blood  - e coli in urine  Troponin Elevation  - no chest pain  - no evidence of PE on CTA; hypoxia + being on estradiol and testosterone at home  Hormone Therapy  - on estradiol and testosterone cream  - possibly should go home off these as she says she is not sure why she is on them  DM  - hemoglobin A1C - <7  - Insulin SS  CKD  - unclear baseline, but may be CKD stage III  Ketonuria  - starvation ketosis likely  Dehydration  - IV NS bolus today    Looks like will need a cardiac cath  Stopped Heparin drip  Strict I/O; Daily Weights  Treat as acute diastolic heart failure / elevated RH pressures  Consider stopping hormone therapy at discharge due to risks may outweigh benefits    High Complexity    DVT prophylaxis: heparin SC  Discharge Planning: I expect patient to be discharged to home next week (maybe 2-3 days)    Romario Walker MD 10/01/17 10:42 AM

## 2017-10-02 LAB
ANION GAP SERPL CALCULATED.3IONS-SCNC: 7 MMOL/L (ref 3–11)
BNP SERPL-MCNC: 113 PG/ML (ref 0–100)
BUN BLD-MCNC: 14 MG/DL (ref 9–23)
BUN/CREAT SERPL: 20 (ref 7–25)
CALCIUM SPEC-SCNC: 8.7 MG/DL (ref 8.7–10.4)
CHLORIDE SERPL-SCNC: 100 MMOL/L (ref 99–109)
CO2 SERPL-SCNC: 30 MMOL/L (ref 20–31)
CREAT BLD-MCNC: 0.7 MG/DL (ref 0.6–1.3)
DEPRECATED RDW RBC AUTO: 45.1 FL (ref 37–54)
ERYTHROCYTE [DISTWIDTH] IN BLOOD BY AUTOMATED COUNT: 13.8 % (ref 11.3–14.5)
GFR SERPL CREATININE-BSD FRML MDRD: 82 ML/MIN/1.73
GLUCOSE BLD-MCNC: 124 MG/DL (ref 70–100)
GLUCOSE BLDC GLUCOMTR-MCNC: 127 MG/DL (ref 70–130)
GLUCOSE BLDC GLUCOMTR-MCNC: 174 MG/DL (ref 70–130)
GLUCOSE BLDC GLUCOMTR-MCNC: 197 MG/DL (ref 70–130)
GLUCOSE BLDC GLUCOMTR-MCNC: 209 MG/DL (ref 70–130)
HCT VFR BLD AUTO: 31 % (ref 34.5–44)
HGB BLD-MCNC: 10 G/DL (ref 11.5–15.5)
MAGNESIUM SERPL-MCNC: 2 MG/DL (ref 1.3–2.7)
MCH RBC QN AUTO: 28.7 PG (ref 27–31)
MCHC RBC AUTO-ENTMCNC: 32.3 G/DL (ref 32–36)
MCV RBC AUTO: 88.8 FL (ref 80–99)
PHOSPHATE SERPL-MCNC: 3.1 MG/DL (ref 2.4–5.1)
PLATELET # BLD AUTO: 130 10*3/MM3 (ref 150–450)
PMV BLD AUTO: 11 FL (ref 6–12)
POTASSIUM BLD-SCNC: 3.8 MMOL/L (ref 3.5–5.5)
RBC # BLD AUTO: 3.49 10*6/MM3 (ref 3.89–5.14)
SODIUM BLD-SCNC: 137 MMOL/L (ref 132–146)
WBC NRBC COR # BLD: 6.24 10*3/MM3 (ref 3.5–10.8)

## 2017-10-02 PROCEDURE — G0008 ADMIN INFLUENZA VIRUS VAC: HCPCS | Performed by: HOSPITALIST

## 2017-10-02 PROCEDURE — 84100 ASSAY OF PHOSPHORUS: CPT | Performed by: HOSPITALIST

## 2017-10-02 PROCEDURE — 80048 BASIC METABOLIC PNL TOTAL CA: CPT | Performed by: HOSPITALIST

## 2017-10-02 PROCEDURE — 82962 GLUCOSE BLOOD TEST: CPT

## 2017-10-02 PROCEDURE — 83880 ASSAY OF NATRIURETIC PEPTIDE: CPT | Performed by: HOSPITALIST

## 2017-10-02 PROCEDURE — 25010000002 PNEUMOCOCCAL VAC POLYVALENT PER 0.5 ML: Performed by: HOSPITALIST

## 2017-10-02 PROCEDURE — 90732 PPSV23 VACC 2 YRS+ SUBQ/IM: CPT | Performed by: HOSPITALIST

## 2017-10-02 PROCEDURE — 25010000002 HEPARIN (PORCINE) PER 1000 UNITS: Performed by: HOSPITALIST

## 2017-10-02 PROCEDURE — 94799 UNLISTED PULMONARY SVC/PX: CPT

## 2017-10-02 PROCEDURE — G0009 ADMIN PNEUMOCOCCAL VACCINE: HCPCS | Performed by: HOSPITALIST

## 2017-10-02 PROCEDURE — 85027 COMPLETE CBC AUTOMATED: CPT | Performed by: HOSPITALIST

## 2017-10-02 PROCEDURE — 83735 ASSAY OF MAGNESIUM: CPT | Performed by: HOSPITALIST

## 2017-10-02 PROCEDURE — 99232 SBSQ HOSP IP/OBS MODERATE 35: CPT | Performed by: INTERNAL MEDICINE

## 2017-10-02 PROCEDURE — 25010000002 MORPHINE SULFATE (PF) 2 MG/ML SOLUTION: Performed by: HOSPITALIST

## 2017-10-02 PROCEDURE — 90674 CCIIV4 VAC NO PRSV 0.5 ML IM: CPT | Performed by: HOSPITALIST

## 2017-10-02 PROCEDURE — 25010000002 INFLUENZA VAC SPLIT QUAD 0.5 ML SUSPENSION PREFILLED SYRINGE: Performed by: HOSPITALIST

## 2017-10-02 PROCEDURE — 25010000003 CEFTRIAXONE PER 250 MG: Performed by: PHYSICIAN ASSISTANT

## 2017-10-02 RX ADMIN — ASPIRIN 81 MG: 81 TABLET, COATED ORAL at 08:46

## 2017-10-02 RX ADMIN — INSULIN LISPRO 2 UNITS: 100 INJECTION, SOLUTION INTRAVENOUS; SUBCUTANEOUS at 17:02

## 2017-10-02 RX ADMIN — INFLUENZA VIRUS VACCINE 0.5 ML: 15; 15; 15; 15 SUSPENSION INTRAMUSCULAR at 05:34

## 2017-10-02 RX ADMIN — HEPARIN SODIUM 5000 UNITS: 5000 INJECTION, SOLUTION INTRAVENOUS; SUBCUTANEOUS at 20:22

## 2017-10-02 RX ADMIN — INSULIN LISPRO 3 UNITS: 100 INJECTION, SOLUTION INTRAVENOUS; SUBCUTANEOUS at 12:21

## 2017-10-02 RX ADMIN — HYDROCODONE BITARTRATE AND ACETAMINOPHEN 1 TABLET: 5; 325 TABLET ORAL at 20:22

## 2017-10-02 RX ADMIN — LISINOPRIL 2.5 MG: 5 TABLET ORAL at 08:46

## 2017-10-02 RX ADMIN — INSULIN LISPRO 2 UNITS: 100 INJECTION, SOLUTION INTRAVENOUS; SUBCUTANEOUS at 21:50

## 2017-10-02 RX ADMIN — METOPROLOL TARTRATE 50 MG: 50 TABLET ORAL at 20:22

## 2017-10-02 RX ADMIN — METOPROLOL TARTRATE 50 MG: 50 TABLET ORAL at 08:46

## 2017-10-02 RX ADMIN — CLOPIDOGREL BISULFATE 75 MG: 75 TABLET ORAL at 08:46

## 2017-10-02 RX ADMIN — HEPARIN SODIUM 5000 UNITS: 5000 INJECTION, SOLUTION INTRAVENOUS; SUBCUTANEOUS at 05:31

## 2017-10-02 RX ADMIN — HEPARIN SODIUM 5000 UNITS: 5000 INJECTION, SOLUTION INTRAVENOUS; SUBCUTANEOUS at 13:37

## 2017-10-02 RX ADMIN — MORPHINE SULFATE 1 MG: 2 INJECTION, SOLUTION INTRAMUSCULAR; INTRAVENOUS at 21:49

## 2017-10-02 RX ADMIN — CEFTRIAXONE SODIUM 2 G: 2 INJECTION, SOLUTION INTRAVENOUS at 12:21

## 2017-10-02 RX ADMIN — PNEUMOCOCCAL VACCINE POLYVALENT 0.5 ML
25; 25; 25; 25; 25; 25; 25; 25; 25; 25; 25; 25; 25; 25; 25; 25; 25; 25; 25; 25; 25; 25; 25 INJECTION, SOLUTION INTRAMUSCULAR; SUBCUTANEOUS at 05:40

## 2017-10-02 RX ADMIN — HYDROCODONE BITARTRATE AND ACETAMINOPHEN 1 TABLET: 5; 325 TABLET ORAL at 08:46

## 2017-10-02 NOTE — PLAN OF CARE
Problem: Patient Care Overview (Adult)  Goal: Plan of Care Review  Outcome: Ongoing (interventions implemented as appropriate)    10/02/17 0412   Coping/Psychosocial Response Interventions   Plan Of Care Reviewed With patient   Patient Care Overview   Progress improving   Outcome Evaluation   Outcome Summary/Follow up Plan Pt. very interactive staff, smiling and pleasant, on room air while awake       Goal: Adult Individualization and Mutuality  Outcome: Ongoing (interventions implemented as appropriate)  Goal: Discharge Needs Assessment  Outcome: Ongoing (interventions implemented as appropriate)    Problem: Older Inpatient, Acutely Ill (Adult)  Goal: Signs and Symptoms of Listed Potential Problems Will be Absent or Manageable (Older Inpatient, Acutely Ill)  Outcome: Ongoing (interventions implemented as appropriate)    Problem: Sepsis (Adult)  Goal: Signs and Symptoms of Listed Potential Problems Will be Absent or Manageable (Sepsis)  Outcome: Ongoing (interventions implemented as appropriate)

## 2017-10-02 NOTE — PROGRESS NOTES
[facility] Medicine Services  INPATIENT PROGRESS NOTE    Date of Admission: 9/28/2017  Length of Stay: 4  Primary Care Physician: Daron Dotson MD    Subjective   CC: Acute pyelonephritis, sepsis  HPI:  The patient is doing well. She has less dyspnea and orthopnea. No fever or cough. No nausea or vomiting. No abdominal pain, diarrhea or constipation  No chest pain or cough    Review Of Systems:   Review of Systems    Otherwise 10 system ROS negative except as noted pertinent positives above in HPI.     Objective      Temp:  [98.1 °F (36.7 °C)-100.2 °F (37.9 °C)] 98.1 °F (36.7 °C)  Heart Rate:  [61-70] 61  Resp:  [18] 18  BP: (110-135)/(73-96) 129/77  Physical Exam  Vital signs reviewed and within normal range  Gen. appearance: Pleasant  female in no distress. He is awake and alert. She is oriented to person place and time  HEENT: Pupils reactive and responsive to light. Extraocular muscles are intact. Dry mucous membrane: Diminished skin turgor. No oral lesions thrush.  Chest: Clear to auscultation bilaterally.  No wheezing, rales or rhonchi  Cardiovascular: Regular rate and rhythm. No murmurs rubs or gallop no increased jugular venous pulsation  Abdomen: Soft. Non tender to palpation. No palpable masses. No CVA tenderness. Normal bowel sounds.   Extremities: No skin lesions or rashes.  Intact peripheral pulses edema of ankles  Neurologic examination: Motor tone and strength are normal. Intact deep tendon reflexes. No focal neurological deficit.  Psychiatric: appropriate affect    Results Review:    I have reviewed the labs, radiology results and diagnostic studies.  1.  Peak troponin of 1.479 improved to 0.349  2.  Elevated BNP at 2:30  3.  Creatinine at 0.7  4.  Anemia with a hemoglobin of 10.0 and an MCV of 88  5.  Urinalysis showed 2+ protein.  2+ ketones.  4+ bacteria    Culture Data: Cultures:    Blood Culture   Date Value Ref Range Status   09/28/2017 Lactose   (A)  Preliminary   09/28/2017 Escherichia coli (A)  Final     Urine Culture   Date Value Ref Range Status   09/28/2017 >100,000 CFU/mL Escherichia coli (A)  Final       Radiology Data:   Chest x-ray personally reviewed.  From September 30, 2017.  Cardiomegaly, pulmonary edema  CT angiogram chest personally reviewed from September 30, 2017.  No evidence of pulmonary embolism.  Diffuse bilateral pulmonary infiltrates with bilateral pleural effusions and adjacent atelectasis    Transthoracic echocardiogram shows an ejection fraction of 60%.  Hypertensive diastolic heart failure grade 2.  Mild tricuspid valve regurgitation.    I have reviewed the medications.    Assessment/Plan     The patient was admitted on September 28, 2017 with fever, chills, nausea, vomiting, flank pain with findings of acute pyelonephritis with secondary bacteremia due to Escherichia coli  After initial volume expansion with normal saline due to sepsis the patient developed sudden onset of supraventricular tachycardia on September 30, 2017 with elevated troponin and dyspnea as well as pulmonary edema  Thought to be type II and STEMI from demand ischemia as well as cardiogenic pulmonary edema due to volume expansion in the setting of diastolic heart failure  The patient has been evaluated by cardiology and infectious disease    1.  Acute pyelonephritis, complicated by sepsis and secondary bacteremia. Improving.   2.  Sepsis, severe present on admission, resolved  3.  Intermittent supraventricular tachycardia in the setting of sepsis resolved.   4.  Acute hypoxic respiratory failure due to cardiogenic pulmonary edema from volume resuscitation in the setting of diastolic heart failure, improved with IV furosemide  5.  Type II non-ST elevation myocardial infarction in the setting of sepsis and hypotension due to demand ischemia. Cardiology planning for outpatient [facility]            Discharge Planning: I expect patient to be discharged to 1-  days  Miguel Ma MD   10/02/17   1:49 PM

## 2017-10-02 NOTE — PROGRESS NOTES
Continued Stay Note  [facility]     Patient Name: Dianne Barry  MRN: [id number]  Today's Date: 10/2/2017    Admit Date: 9/28/2017          Discharge Plan       10/02/17 0910    Case Management/Social Work Plan    Additional Comments CM continues to follow. Pt reports her goal remains to return home.              Discharge Codes     None        Expected Discharge Date and Time     Expected Discharge Date Expected Discharge Time    Oct 1, 2017             Amy Abreu RN

## 2017-10-02 NOTE — PROGRESS NOTES
"[facility] Daily Note       LOS: 4 days   Patient Care Team:  Daron Dotson MD as PCP - General  Daron Dotson MD as PCP - Family Medicine    Chief Complaint: Elevated troponin     Subjective: Denies chest pain, dyspnea    Review of Systems:   As above.    Medications:    aspirin 81 mg Oral Daily   atorvastatin 40 mg Oral Nightly   ceftriaxone 2 g Intravenous Q24H   clopidogrel 75 mg Oral Daily   heparin (porcine) 5,000 Units Subcutaneous Q8H   insulin lispro 0-7 Units Subcutaneous 4x Daily AC & at Bedtime   lisinopril 2.5 mg Oral Q24H   metoprolol tartrate 50 mg Oral Q12H       Vital Sign Min/Max for last 24 hours  Temp  Min: 98.1 °F (36.7 °C)  Max: 100.2 °F (37.9 °C)   BP  Min: 110/73  Max: 135/96   Pulse  Min: 61  Max: 70   Resp  Min: 18  Max: 18   SpO2  Min: 92 %  Max: 95 %   Flow (L/min)  Min: 1  Max: 3   Weight  Min: 169 lb 3.2 oz (76.7 kg)  Max: 169 lb 6.4 oz (76.8 kg)      Intake/Output Summary (Last 24 hours) at 10/02/17 0947  Last data filed at 10/02/17 0926   Gross per 24 hour   Intake             1250 ml   Output             1050 ml   Net              200 ml        Flowsheet Rows         First Filed Value    Admission Height  65.5\" (166.4 cm) Documented at 09/28/2017 1529    Admission Weight  168 lb (76.2 kg) Documented at 09/28/2017 1529          Physical Exam:    GENERAL: well-developed, well-nourished; in no acute distress.   LUNGS: Clear to auscultation bilaterally without wheezing, rhonchi, or rales noted.   CARDIOVASCULAR: The heart has a regular rate with a normal S1 and S2. There is no murmur, gallop, rub, or click appreciated. The PMI is nondisplaced.   ABDOMEN: Soft and nontender  PERIPHERAL VASCULAR:  Posterior tibial and dorsalis pedis pulses are 2+ and symmetrical. There is no peripheral edema.      Results Review:    I reviewed the patient's new clinical results.    Tele:  NSR    Labs:      Results from last 7 days  Lab Units 10/02/17  0436 10/01/17  0443 09/30/17  3382 " 09/30/17  0541 09/29/17  0356 09/28/17  1601   SODIUM mmol/L 137 137  --  137 137 136   POTASSIUM mmol/L 3.8 3.3* 3.5 3.0* 3.6 3.0*   CHLORIDE mmol/L 100 100  --  104 108 101   CO2 mmol/L 30.0 29.0  --  22.0 21.0 28.0   BUN mg/dL 14 14  --  15 17 16   CREATININE mg/dL 0.70 0.60  --  0.80 1.10 1.00   CALCIUM mg/dL 8.7 8.0*  --  8.3* 7.4* 9.1   BILIRUBIN mg/dL  --   --   --   --  0.3 0.7   ALK PHOS U/L  --   --   --   --  41 56   ALT (SGPT) U/L  --   --   --   --  16 16   AST (SGOT) U/L  --   --   --   --  25 21   GLUCOSE mg/dL 124* 140*  --  162* 200* 200*       Results from last 7 days  Lab Units 10/02/17  0436 10/01/17  0443 09/30/17  2252   WBC 10*3/mm3 6.24 7.50 8.01   HEMOGLOBIN g/dL 10.0* 9.9* 10.3*   HEMATOCRIT % 31.0* 29.8* 31.1*   PLATELETS 10*3/mm3 130* 98* 91*     Lab Results   Component Value Date    TROPONINI 0.349 (H) 10/01/2017    TROPONINI 0.756 (H) 09/30/2017    TROPONINI 1.430 (C) 09/29/2017     Lab Results   Component Value Date    CHOL 152 09/29/2017     Lab Results   Component Value Date    TRIG 108 09/29/2017     Lab Results   Component Value Date    HDL 50 09/29/2017     No results found for: LDLCALC  Lab Results   Component Value Date    INR 1.07 09/29/2017    PROTIME 11.7 (H) 09/29/2017       Ejection Fraction:  55-60% per echo      Assessment:  1. Elevated Troponin in setting of sepsis and hypotension; likely demand ischemia.    -No chest pain or concerning anginal equivalents currently or in recent history.    -Has risk factors for CAD.    -S/p 48 hours Heparin  2. Hypotension: Improved  3. Sepsis/Bactremia/UTI- ID following  4. DM  5. CKD Stage III, Cr currently 0.8  6. Anemia  7. Intermittent SVT - new as of 9/30 AM; normalized after Lasix and beta blocker; no further significant SVT noted 10/1  8. Pulmonary congestion/Acute diastolic HF - noted on exam and CXR 9/30; likely exacerbated by underlying infection, fluid administration and underlying diastolic HF      Plan:  -Likely discharge  tomorrow  -LHC in 2 weeks after antibiotic course; will schedule tomorrow  -Continue ASA, clopidogrel, metoprolol for now  -Patient reluctant to take statin; LDL 68; will hold off until [facility]; if with CAD, will restart statin    Klaudia Raza, KEISHA  10/02/17  9:47 AM    Oral Velazco MD, MSc, [facility]  Interventional Cardiology  [facility] at [facility]

## 2017-10-02 NOTE — PROGRESS NOTES
"[facility] Progress Note    Date of Admission: 2017      Antibiotics:  Ceftriaxone    CC:   Chief Complaint   Patient presents with   • Vomiting   fever/chills    S:  Some LGF today. Ready to go home today and plans for heart cath as an outpatient. Patient concerned about outpatient antibiotic needs. Continues to wear supplemental O2    O:  /77 (BP Location: Left arm, Patient Position: Lying)  Pulse 61  Temp 98.1 °F (36.7 °C) (Oral)   Resp 18  Ht 65.5\" (166.4 cm)  Wt 169 lb 3.2 oz (76.7 kg)  SpO2 94%  BMI 27.73 kg/m2  Temp (24hrs), Av °F (37.2 °C), Min:98.1 °F (36.7 °C), Max:100.2 °F (37.9 °C)      PE:   GEN: Awake and alert, in no acute distress. Improved appearance  HEENT: NCAT.  EOMI. No conjunctival injection. No icterus. Oropharynx clear without evidence of thrush or exudate.   NECK: Supple without nuchal rigidity  HEART: RRR, No murmur, rubs, gallops.   LUNGS: Clear to auscultation bilaterally without wheezing, rales, rhonchi. Normal respiratory effort.  ABDOMEN: Soft, nontender, nondistended. Positive bowel sounds. No rebound or guarding.   EXT:  No cyanosis, clubbing or edema  : Normal appearing genitalia without Colin catheter.  MSK: FROM without joint effusions noted    SKIN: Warm and dry without cutaneous eruptions.    NEURO: Oriented to PPT. No focal deficits.     Laboratory Data      Results from last 7 days  Lab Units 10/02/17  0436 10/01/17  0443 17  2252   WBC 10*3/mm3 6.24 7.50 8.01   HEMOGLOBIN g/dL 10.0* 9.9* 10.3*   HEMATOCRIT % 31.0* 29.8* 31.1*   PLATELETS 10*3/mm3 130* 98* 91*       Results from last 7 days  Lab Units 10/02/17  0436   SODIUM mmol/L 137   POTASSIUM mmol/L 3.8   CHLORIDE mmol/L 100   CO2 mmol/L 30.0   BUN mg/dL 14   CREATININE mg/dL 0.70   GLUCOSE mg/dL 124*   CALCIUM mg/dL 8.7       Results from last 7 days  Lab Units 17  0356   ALK PHOS U/L 41   BILIRUBIN mg/dL 0.3   ALT (SGPT) U/L 16   AST (SGOT) U/L 25       Results from last 7 days  Lab Units " 09/30/17  0541   SED RATE mm/hr 27       Results from last 7 days  Lab Units 09/30/17  0541   CRP mg/dL 21.49*       Estimated Creatinine Clearance: 65.8 mL/min (by C-G formula based on Cr of 0.7).      Microbiology:  The urine with pan susceptible Escherichia coli  Blood cultures 2 out of 2 with Escherichia coli    Radiology:  Imaging Results (last 24 hours)     ** No results found for the last 24 hours. **        PROBLEM LIST:   Sepsis present on admission  E coli bacteremia of urinary source   Escherichia coli UTI  Pulmonary edema  SVT  Fever  Leukocytosis  Elevated Procalcitonin  Elevater Troponins- secondary to sepsis  Anemia  Acute thrombocytopenia- improved  T2 diabetes mellitus  Hypertension  Hypokalemia -improved     ASSESSMENT:  Patient is a 72-year-old female admitted secondary to sepsis with fever, tachycardia, leukocytosis at the time of ED evaluation with blood cultures x 2 positive for E.coli on 2/2 sets.  Her urinalysis has 20-30 white blood cells and 4+ bacteria for urinary source of infection the patient denies dysuria, hematuria, urinary frequency pressure.       Sepsis improving on ceftriaxone but has developed some pulmonary edema and SVT cardiology monitoring and recommending [facility]- potentially as an outpatient. From infection standpoint, currently on Rocephin with improvement.     PLAN:  Rocephin 2 g IV daily    Plan for daily IV antibiotics [facility] upon discharge   If heart cath not urgent, agree with [facility] as an outpatient.   OK per ID to discharge home today or tomorrow with infusions at [facility] with PIV   If plan is to discharge home today, she will see Dr. Landon tomorrow at 12:15 in the office for initial IV antibiotic with PIV.    UM:  Ok to d/c today and f/u in our office tomorrow for infusion with ceftriaxone and AOP f/u     Dr. Chaz Landon saw the patient, performed the physical exam, reviewed the laboratory data and guided with the formulation of the above problem list, assessment and  treatment plan.       Chaz Landon MD  10/2/2017

## 2017-10-02 NOTE — PLAN OF CARE
Problem: Patient Care Overview (Adult)  Goal: Plan of Care Review  Outcome: Ongoing (interventions implemented as appropriate)    10/02/17 9405   Coping/Psychosocial Response Interventions   Plan Of Care Reviewed With patient   Patient Care Overview   Progress improving   Outcome Evaluation   Outcome Summary/Follow up Plan pt. wants to go home. Pt. is still on RA when awake.          Problem: Older Inpatient, Acutely Ill (Adult)  Goal: Signs and Symptoms of Listed Potential Problems Will be Absent or Manageable (Older Inpatient, Acutely Ill)  Outcome: Ongoing (interventions implemented as appropriate)    Problem: Sepsis (Adult)  Goal: Signs and Symptoms of Listed Potential Problems Will be Absent or Manageable (Sepsis)  Outcome: Ongoing (interventions implemented as appropriate)

## 2017-10-03 VITALS
TEMPERATURE: 98.9 F | SYSTOLIC BLOOD PRESSURE: 145 MMHG | HEART RATE: 60 BPM | HEIGHT: 66 IN | WEIGHT: 170.8 LBS | OXYGEN SATURATION: 88 % | RESPIRATION RATE: 16 BRPM | BODY MASS INDEX: 27.45 KG/M2 | DIASTOLIC BLOOD PRESSURE: 86 MMHG

## 2017-10-03 DIAGNOSIS — R77.8 ELEVATED TROPONIN: Primary | ICD-10-CM

## 2017-10-03 LAB
GLUCOSE BLDC GLUCOMTR-MCNC: 136 MG/DL (ref 70–130)
GLUCOSE BLDC GLUCOMTR-MCNC: 181 MG/DL (ref 70–130)

## 2017-10-03 PROCEDURE — 25010000003 CEFTRIAXONE PER 250 MG: Performed by: PHYSICIAN ASSISTANT

## 2017-10-03 PROCEDURE — 25010000002 HEPARIN (PORCINE) PER 1000 UNITS: Performed by: HOSPITALIST

## 2017-10-03 PROCEDURE — 99238 HOSP IP/OBS DSCHRG MGMT 30/<: CPT | Performed by: INTERNAL MEDICINE

## 2017-10-03 PROCEDURE — 82962 GLUCOSE BLOOD TEST: CPT

## 2017-10-03 PROCEDURE — 99232 SBSQ HOSP IP/OBS MODERATE 35: CPT | Performed by: INTERNAL MEDICINE

## 2017-10-03 RX ORDER — CLOPIDOGREL BISULFATE 75 MG/1
75 TABLET ORAL DAILY
Qty: 90 TABLET | Refills: 3 | Status: SHIPPED | OUTPATIENT
Start: 2017-10-04 | End: 2017-10-17 | Stop reason: HOSPADM

## 2017-10-03 RX ORDER — METOPROLOL TARTRATE 50 MG/1
50 TABLET, FILM COATED ORAL EVERY 12 HOURS SCHEDULED
Qty: 180 TABLET | Refills: 3 | Status: SHIPPED | OUTPATIENT
Start: 2017-10-03 | End: 2022-03-16 | Stop reason: SDUPTHER

## 2017-10-03 RX ORDER — LISINOPRIL 5 MG/1
5 TABLET ORAL
Status: DISCONTINUED | OUTPATIENT
Start: 2017-10-04 | End: 2017-10-03 | Stop reason: HOSPADM

## 2017-10-03 RX ADMIN — ASPIRIN 81 MG: 81 TABLET, COATED ORAL at 09:28

## 2017-10-03 RX ADMIN — HEPARIN SODIUM 5000 UNITS: 5000 INJECTION, SOLUTION INTRAVENOUS; SUBCUTANEOUS at 05:41

## 2017-10-03 RX ADMIN — CEFTRIAXONE SODIUM 2 G: 2 INJECTION, SOLUTION INTRAVENOUS at 11:47

## 2017-10-03 RX ADMIN — LISINOPRIL 2.5 MG: 5 TABLET ORAL at 09:28

## 2017-10-03 RX ADMIN — METOPROLOL TARTRATE 50 MG: 50 TABLET ORAL at 09:29

## 2017-10-03 RX ADMIN — INSULIN LISPRO 2 UNITS: 100 INJECTION, SOLUTION INTRAVENOUS; SUBCUTANEOUS at 11:58

## 2017-10-03 RX ADMIN — CLOPIDOGREL BISULFATE 75 MG: 75 TABLET ORAL at 09:28

## 2017-10-03 NOTE — PROGRESS NOTES
"[facility] Daily Note       LOS: 5 days   Patient Care Team:  Daron Dotson MD as PCP - General  Daron Dotson MD as PCP - Family Medicine    Chief Complaint: Elevated troponin     Subjective:   Denies chest pain, dyspnea    Review of Systems:   As above.    Medications:    aspirin 81 mg Oral Daily   ceftriaxone 2 g Intravenous Q24H   clopidogrel 75 mg Oral Daily   heparin (porcine) 5,000 Units Subcutaneous Q8H   insulin lispro 0-7 Units Subcutaneous 4x Daily AC & at Bedtime   [START ON 10/4/2017] lisinopril 5 mg Oral Q24H   metoprolol tartrate 50 mg Oral Q12H       Vital Sign Min/Max for last 24 hours  Temp  Min: 98.4 °F (36.9 °C)  Max: 98.9 °F (37.2 °C)   BP  Min: 141/76  Max: 147/76   Pulse  Min: 52  Max: 64   Resp  Min: 16  Max: 18   SpO2  Min: 88 %  Max: 95 %   Flow (L/min)  Min: 2  Max: 2   Weight  Min: 170 lb 12.8 oz (77.5 kg)  Max: 170 lb 12.8 oz (77.5 kg)      Intake/Output Summary (Last 24 hours) at 10/03/17 1317  Last data filed at 10/03/17 1147   Gross per 24 hour   Intake              890 ml   Output              600 ml   Net              290 ml        Flowsheet Rows         First Filed Value    Admission Height  65.5\" (166.4 cm) Documented at 09/28/2017 1529    Admission Weight  168 lb (76.2 kg) Documented at 09/28/2017 1529          Physical Exam:    GENERAL: well-developed, well-nourished; in no acute distress.   LUNGS: Clear to auscultation bilaterally without wheezing, rhonchi, or rales noted.   CARDIOVASCULAR: The heart has a regular rate with a normal S1 and S2. There is no murmur, gallop, rub, or click appreciated.   PERIPHERAL VASCULAR:  There is no peripheral edema.      Results Review:    I reviewed the patient's new clinical results.    Tele:  NSR    Labs:      Results from last 7 days  Lab Units 10/02/17  0436 10/01/17  0443 09/30/17  2252 09/30/17  0541 09/29/17  0356 09/28/17  1601   SODIUM mmol/L 137 137  --  137 137 136   POTASSIUM mmol/L 3.8 3.3* 3.5 3.0* 3.6 3.0* "   CHLORIDE mmol/L 100 100  --  104 108 101   CO2 mmol/L 30.0 29.0  --  22.0 21.0 28.0   BUN mg/dL 14 14  --  15 17 16   CREATININE mg/dL 0.70 0.60  --  0.80 1.10 1.00   CALCIUM mg/dL 8.7 8.0*  --  8.3* 7.4* 9.1   BILIRUBIN mg/dL  --   --   --   --  0.3 0.7   ALK PHOS U/L  --   --   --   --  41 56   ALT (SGPT) U/L  --   --   --   --  16 16   AST (SGOT) U/L  --   --   --   --  25 21   GLUCOSE mg/dL 124* 140*  --  162* 200* 200*       Results from last 7 days  Lab Units 10/02/17  0436 10/01/17  0443 09/30/17  2252   WBC 10*3/mm3 6.24 7.50 8.01   HEMOGLOBIN g/dL 10.0* 9.9* 10.3*   HEMATOCRIT % 31.0* 29.8* 31.1*   PLATELETS 10*3/mm3 130* 98* 91*     Lab Results   Component Value Date    TROPONINI 0.349 (H) 10/01/2017    TROPONINI 0.756 (H) 09/30/2017    TROPONINI 1.430 (C) 09/29/2017     Lab Results   Component Value Date    CHOL 152 09/29/2017     Lab Results   Component Value Date    TRIG 108 09/29/2017     Lab Results   Component Value Date    HDL 50 09/29/2017     No results found for: LDLCALC  Lab Results   Component Value Date    INR 1.07 09/29/2017    PROTIME 11.7 (H) 09/29/2017       Ejection Fraction:  55-60% per echo      Assessment:  1. Elevated Troponin in setting of sepsis and hypotension; likely demand ischemia.    -No chest pain or concerning anginal equivalents currently or in recent history.    -Has risk factors for CAD.    -S/p 48 hours Heparin  2. Hypotension: Improved  3. Sepsis/Bactremia/UTI- ID following  4. DM  5. CKD Stage III, Cr currently 0.8  6. Anemia  7. Intermittent SVT - new as of 9/30 AM; normalized after Lasix and beta blocker; no further significant SVT noted; brief, not long lasting; will not assign a diagnosis of atrial fibrillation at this time since the runs were all less than 30 seconds long  8. Pulmonary congestion/Acute diastolic HF - noted on exam and CXR 9/30; likely exacerbated by underlying infection, fluid administration and underlying diastolic HF      Plan:  -Okay for  discharge from cardiac standpoint on ASA, clopidogrel, statin, metoprolol  -[facility] on or around 10/17; right radial artery access; cardiology  to call patient to confirm  -Patient reluctant to take statin; LDL 68; will hold off until [facility]; if with CAD, will restart statin    Oral Velazco MD, MSc, [facility]  Interventional Cardiology  [facility] at [facility]

## 2017-10-03 NOTE — PLAN OF CARE
Problem: Patient Care Overview (Adult)  Goal: Plan of Care Review  Outcome: Ongoing (interventions implemented as appropriate)    10/03/17 1329   Coping/Psychosocial Response Interventions   Plan Of Care Reviewed With patient   Patient Care Overview   Progress improving   Outcome Evaluation   Outcome Summary/Follow up Plan vss, f/u appts made, will d/c home today       Goal: Adult Individualization and Mutuality  Outcome: Ongoing (interventions implemented as appropriate)    10/03/17 1329   Individualization   Patient Specific Goals will go home today   Patient Specific Interventions facilitate d/c         Problem: Older Inpatient, Acutely Ill (Adult)  Goal: Signs and Symptoms of Listed Potential Problems Will be Absent or Manageable (Older Inpatient, Acutely Ill)  Outcome: Ongoing (interventions implemented as appropriate)    10/03/17 1329   Older Adult Inpatient, Acutely Ill   Problems Assessed (Acutely Ill Older Adult) all   Problems Present (Acutely Ill Older Adult) fluid imbalance;voiding dysfunction         Problem: Sepsis (Adult)  Goal: Signs and Symptoms of Listed Potential Problems Will be Absent or Manageable (Sepsis)  Outcome: Ongoing (interventions implemented as appropriate)    10/03/17 1329   Sepsis   Problems Assessed (Sepsis) all   Problems Present (Sepsis) glycemic control, impaired;hypoperfusion/hemodynamic instability;hypoxia/hypoxemia;progression of infection

## 2017-10-03 NOTE — PLAN OF CARE
Problem: Patient Care Overview (Adult)  Goal: Plan of Care Review  Outcome: Ongoing (interventions implemented as appropriate)    10/03/17 0441   Coping/Psychosocial Response Interventions   Plan Of Care Reviewed With patient   Patient Care Overview   Progress progress toward functional goals as expected   Outcome Evaluation   Outcome Summary/Follow up Plan pt vitals stable able to ambulate safely with assist x 1, pt to dc today and follow up with [facility] for iv abx         Problem: Older Inpatient, Acutely Ill (Adult)  Goal: Signs and Symptoms of Listed Potential Problems Will be Absent or Manageable (Older Inpatient, Acutely Ill)  Outcome: Ongoing (interventions implemented as appropriate)    Problem: Sepsis (Adult)  Goal: Signs and Symptoms of Listed Potential Problems Will be Absent or Manageable (Sepsis)  Outcome: Ongoing (interventions implemented as appropriate)

## 2017-10-03 NOTE — PROGRESS NOTES
"[facility] Progress Note    Date of Admission: 2017      Antibiotics:  Ceftriaxone    CC:   Chief Complaint   Patient presents with   • Vomiting   fever/chills    S:  No fever.  Ready to go home.  White blood cell count normal.  Good urine production.  Agreeable to outpatient IV antibiotics at [facility].  [facility] in 2 weeks    O:  /86  Pulse 60  Temp 98.9 °F (37.2 °C) (Oral)   Resp 16  Ht 65.5\" (166.4 cm)  Wt 170 lb 12.8 oz (77.5 kg)  SpO2 (!) 88%  BMI 27.99 kg/m2  Temp (24hrs), Av.7 °F (37.1 °C), Min:98.4 °F (36.9 °C), Max:98.9 °F (37.2 °C)      PE:   GEN: Awake and alert, in no acute distress. Improved appearance  HEENT: NCAT.  EOMI. No conjunctival injection. No icterus. Oropharynx clear without evidence of thrush or exudate.   NECK: Supple without nuchal rigidity  HEART: RRR, No murmur, rubs, gallops.   LUNGS: Clear to auscultation bilaterally without wheezing, rales, rhonchi. Normal respiratory effort.  ABDOMEN: Soft, nontender, nondistended. Positive bowel sounds. No rebound or guarding.   EXT:  No cyanosis, clubbing or edema  : Normal appearing genitalia without Colin catheter.  MSK: FROM without joint effusions noted    SKIN: Warm and dry without cutaneous eruptions.    NEURO: Oriented to PPT. No focal deficits.     Laboratory Data      Results from last 7 days  Lab Units 10/02/17  0436 10/01/17  0443 17  2252   WBC 10*3/mm3 6.24 7.50 8.01   HEMOGLOBIN g/dL 10.0* 9.9* 10.3*   HEMATOCRIT % 31.0* 29.8* 31.1*   PLATELETS 10*3/mm3 130* 98* 91*       Results from last 7 days  Lab Units 10/02/17  0436   SODIUM mmol/L 137   POTASSIUM mmol/L 3.8   CHLORIDE mmol/L 100   CO2 mmol/L 30.0   BUN mg/dL 14   CREATININE mg/dL 0.70   GLUCOSE mg/dL 124*   CALCIUM mg/dL 8.7       Results from last 7 days  Lab Units 17  0356   ALK PHOS U/L 41   BILIRUBIN mg/dL 0.3   ALT (SGPT) U/L 16   AST (SGOT) U/L 25       Results from last 7 days  Lab Units 17  0541   SED RATE mm/hr 27 "       Results from last 7 days  Lab Units 09/30/17  0541   CRP mg/dL 21.49*       Estimated Creatinine Clearance: 66.1 mL/min (by C-G formula based on Cr of 0.7).      Microbiology:  The urine with pan susceptible Escherichia coli  Blood cultures 2 out of 2 with Escherichia coli    Radiology:  Imaging Results (last 24 hours)     ** No results found for the last 24 hours. **        PROBLEM LIST:   Sepsis present on admission  E coli bacteremia of urinary source   Escherichia coli UTI  Pulmonary edema  SVT  Fever  Leukocytosis  Elevated Procalcitonin  Elevater Troponins- secondary to sepsis  Anemia  Acute thrombocytopenia- improved  T2 diabetes mellitus  Hypertension  Hypokalemia -improved     ASSESSMENT:  Patient is a 72-year-old female admitted secondary to sepsis with fever, tachycardia, leukocytosis at the time of ED evaluation with blood cultures x 2 positive for E.coli on 2/2 sets.  Her urinalysis has 20-30 white blood cells and 4+ bacteria for urinary source of infection the patient denies dysuria, hematuria, urinary frequency pressure.       Sepsis improving on ceftriaxone but has developed some pulmonary edema and SVT cardiology monitoring and recommending [facility]- potentially as an outpatient. From infection standpoint, currently on Rocephin with improvement and will see patient in office tomorrow at 1130 to initiate PIV infusions.    PLAN:  Rocephin 2 g IV daily  Plan for daily IV antibiotics [facility] upon discharge    OK per ID to discharge home today with infusions at [facility] with PIV   If plan is to discharge home today, she will see Dr. Landon tomorrow at 1130 in the office for initial IV antibiotic with PIV.    UM:  Ok to d/c today and f/u in our office tomorrow for infusion with ceftriaxone and AOP f/u     Dr. Chaz Landon saw the patient, performed the physical exam, reviewed the laboratory data and guided with the formulation of the above problem list, assessment and treatment plan.       Chaz Landon,  MD  10/3/2017

## 2017-10-03 NOTE — DISCHARGE SUMMARY
[facility] Medicine Services  DISCHARGE SUMMARY       Date of Admission: 9/28/2017  Date of Discharge:  10/3/2017  Primary Care Physician: Daron Dotson MD  Consulting Physician(s)     Provider Relationship Specialty    Chaz Landon MD Consulting Physician Infectious Diseases    Oral Velazco MD Consulting Physician Cardiology          The patient was admitted on September 28, 2017 with fever, chills, nausea, vomiting, flank pain with findings of acute pyelonephritis with secondary bacteremia due to Escherichia coli  After initial volume expansion with normal saline due to sepsis the patient developed sudden onset of supraventricular tachycardia on September 30, 2017 with elevated troponin and dyspnea as well as pulmonary edema  Thought to be type II and STEMI from demand ischemia as well as cardiogenic pulmonary edema due to volume expansion in the setting of diastolic heart failure  The patient has been evaluated by cardiology and infectious disease     1.  Acute pyelonephritis, complicated by sepsis and secondary bacteremia due to Escherichia coli treated with intravenous Rocephin.  At the time of discharge the patient was afebrile.  She was hemodynamically stable.  She was tolerating a regular diet  2.  Sepsis, severe present on admission, resolved  3.  Intermittent supraventricular tachycardia in the setting of sepsis resolved.   4.  Acute hypoxic respiratory failure due to cardiogenic pulmonary edema from volume resuscitation in the setting of diastolic heart failure, improved with IV furosemide.  At the time of discharge the patient was euvolemic.  She did not require supplemental oxygen  5.  Type II non-ST elevation myocardial infarction in the setting of sepsis and hypotension due to demand ischemia. Cardiology planning for outpatient [facility].  The patient was discharged on aspirin, clopidogrel, metoprolol and she refused to take statin          Consults:   Consults     Date and  "Time Order Name Status Description    9/29/2017 0827 Inpatient Consult to Infectious Diseases Completed     9/29/2017 0721 Inpatient Consult to Cardiology Completed           Pertinent Test Results:  1.  Peak troponin of 1.479 improved to 0.349  2.  Elevated BNP at 2:30  3.  Creatinine at 0.7  4.  Anemia with a hemoglobin of 10.0 and an MCV of 88  5.  Urinalysis showed 2+ protein.  2+ ketones.  4+ bacteria       Chest x-ray personally reviewed.  From September 30, 2017.  Cardiomegaly, pulmonary edema  CT angiogram chest personally reviewed from September 30, 2017.  No evidence of pulmonary embolism.  Diffuse bilateral pulmonary infiltrates with bilateral pleural effusions and adjacent atelectasis     Transthoracic echocardiogram shows an ejection fraction of 60%.  Hypertensive diastolic heart failure grade 2.  Mild tricuspid valve regurgitation.  Condition on Discharge:  Good    Physical Exam on Discharge:/86  Pulse 60  Temp 98.9 °F (37.2 °C) (Oral)   Resp 16  Ht 65.5\" (166.4 cm)  Wt 170 lb 12.8 oz (77.5 kg)  SpO2 (!) 88%  BMI 27.99 kg/m2  Physical Exam  Vital signs reviewed and within normal range  Gen. appearance: Pleasant  female in no distress. He is awake and alert. She is oriented to person place and time  HEENT: Pupils reactive and responsive to light. Extraocular muscles are intact. Dry mucous membrane: Diminished skin turgor. No oral lesions thrush.  Chest: Clear to auscultation bilaterally.  No wheezing, rales or rhonchi  Cardiovascular: Regular rate and rhythm. No murmurs rubs or gallop no increased jugular venous pulsation  Abdomen: Soft. Non tender to palpation. No palpable masses. No CVA tenderness. Normal bowel sounds.   Extremities: No skin lesions or rashes. No edema. Intact peripheral pulses  Neurologic examination: Motor tone and strength are normal. Intact deep tendon reflexes. No focal neurological deficit.  Psychiatric: appropriate affect    Discharge Disposition  Home or " Self Care    Discharge Medications   Dianne Barry   Home Medication Instructions RODRIGUEZ:[id number]    Printed on:10/03/17 1122   Medication Information                      aspirin 81 MG EC tablet  Take 81 mg by mouth Daily.             BIOTIN PO  Take 1 capsule by mouth Daily.             calcium citrate-vitamin d (CITRACAL) 200-250 MG-UNIT tablet tablet  Take 2 tablets by mouth Daily.             CINNAMON PO  Take 2 capsules by mouth Daily.             clopidogrel (PLAVIX) 75 MG tablet  Take 1 tablet by mouth Daily.             glucosamine sulfate 500 MG capsule capsule  Take 1,000 mg by mouth Daily.             lisinopril (PRINIVIL,ZESTRIL) 10 MG tablet  Take 10 mg by mouth Daily.             metoprolol tartrate (LOPRESSOR) 50 MG tablet  Take 1 tablet by mouth Every 12 (Twelve) Hours.             Omega-3 Fatty Acids (FISH OIL) 1000 MG capsule capsule  Take 1,000 mg by mouth Daily.             vitamin C (ASCORBIC ACID) 500 MG tablet  Take 500 mg by mouth Daily.                 Discharge Diet:  regular consistency, cardiac        Follow-up Appointments  Future Appointments  Date Time Provider Department Center   11/7/2017 8:20 AM [facility]  [facility]     Additional Instructions for the Follow-ups that You Need to Schedule     Discharge Follow-up with PCP    As directed    Follow Up Details:  follow up post hospital discharge for pyelonephritis, secondary bacteremia, NSTEMI       Discharge Follow-up with Specialty    As directed    Specialty:  Dr. Velazco for left heart catheterization   Follow Up:  2 Weeks       Discharge Follow-up with Specialty    As directed    Specialty:  Infectious Diseases. Dr. Landon   Follow Up Details:  Daily IV rocephin for E.coli pyelonephritis and bacteremia                 Test Results Pending at Discharge   Order Current Status    Blood Culture - Blood, Preliminary result           Miguel Ma MD 10/03/17 11:22 AM    Time:  A total of 20 minutes of time were spent in  the preparation of the discharge summary including patient education and coordination of care    Please note that portions of this note may have been completed with a voice recognition program. Efforts were made to edit the dictations, but occasionally words are mistranscribed.

## 2017-10-04 LAB
BACTERIA SPEC AEROBE CULT: ABNORMAL
BACTERIA SPEC AEROBE CULT: ABNORMAL
GRAM STN SPEC: ABNORMAL
GRAM STN SPEC: ABNORMAL
ISOLATED FROM: ABNORMAL

## 2017-10-05 ENCOUNTER — PREP FOR SURGERY (OUTPATIENT)
Dept: OTHER | Facility: HOSPITAL | Age: 72
End: 2017-10-05

## 2017-10-05 DIAGNOSIS — I20.9 ANGINA PECTORIS (HCC): Primary | ICD-10-CM

## 2017-10-09 ENCOUNTER — TRANSCRIBE ORDERS (OUTPATIENT)
Dept: LAB | Facility: HOSPITAL | Age: 72
End: 2017-10-09

## 2017-10-09 ENCOUNTER — LAB (OUTPATIENT)
Dept: LAB | Facility: HOSPITAL | Age: 72
End: 2017-10-09

## 2017-10-09 ENCOUNTER — PREP FOR SURGERY (OUTPATIENT)
Dept: OTHER | Facility: HOSPITAL | Age: 72
End: 2017-10-09

## 2017-10-09 DIAGNOSIS — IMO0001 ESCHERICHIA COLI SPECIES NOT ISOLATED: ICD-10-CM

## 2017-10-09 DIAGNOSIS — A41.51 SEPTICEMIA DUE TO E. COLI (HCC): ICD-10-CM

## 2017-10-09 DIAGNOSIS — N39.0 URINARY TRACT INFECTION WITHOUT HEMATURIA, SITE UNSPECIFIED: ICD-10-CM

## 2017-10-09 DIAGNOSIS — J81.0 POSTOPERATIVE PULMONARY EDEMA (HCC): ICD-10-CM

## 2017-10-09 DIAGNOSIS — D72.823 NEUTROPHILIC LEUKEMOID REACTION: ICD-10-CM

## 2017-10-09 DIAGNOSIS — R77.8 ELEVATED TROPONIN LEVEL: Primary | ICD-10-CM

## 2017-10-09 DIAGNOSIS — A41.51 SEPTICEMIA DUE TO E. COLI (HCC): Primary | ICD-10-CM

## 2017-10-09 LAB
ALBUMIN SERPL-MCNC: 4.2 G/DL (ref 3.2–4.8)
ALBUMIN/GLOB SERPL: 1.6 G/DL (ref 1.5–2.5)
ALP SERPL-CCNC: 90 U/L (ref 25–100)
ALT SERPL W P-5'-P-CCNC: 25 U/L (ref 7–40)
ANION GAP SERPL CALCULATED.3IONS-SCNC: 5 MMOL/L (ref 3–11)
AST SERPL-CCNC: 19 U/L (ref 0–33)
BASOPHILS # BLD AUTO: 0.04 10*3/MM3 (ref 0–0.2)
BASOPHILS NFR BLD AUTO: 0.6 % (ref 0–1)
BILIRUB SERPL-MCNC: 0.4 MG/DL (ref 0.3–1.2)
BUN BLD-MCNC: 20 MG/DL (ref 9–23)
BUN/CREAT SERPL: 28.6 (ref 7–25)
CALCIUM SPEC-SCNC: 9.4 MG/DL (ref 8.7–10.4)
CHLORIDE SERPL-SCNC: 106 MMOL/L (ref 99–109)
CO2 SERPL-SCNC: 30 MMOL/L (ref 20–31)
CREAT BLD-MCNC: 0.7 MG/DL (ref 0.6–1.3)
CRP SERPL-MCNC: 1.56 MG/DL (ref 0–1)
DEPRECATED RDW RBC AUTO: 42.2 FL (ref 37–54)
EOSINOPHIL # BLD AUTO: 0.03 10*3/MM3 (ref 0–0.3)
EOSINOPHIL NFR BLD AUTO: 0.5 % (ref 0–3)
ERYTHROCYTE [DISTWIDTH] IN BLOOD BY AUTOMATED COUNT: 13.1 % (ref 11.3–14.5)
ERYTHROCYTE [SEDIMENTATION RATE] IN BLOOD: 22 MM/HR (ref 0–30)
GFR SERPL CREATININE-BSD FRML MDRD: 82 ML/MIN/1.73
GLOBULIN UR ELPH-MCNC: 2.6 GM/DL
GLUCOSE BLD-MCNC: 140 MG/DL (ref 70–100)
HCT VFR BLD AUTO: 35.5 % (ref 34.5–44)
HGB BLD-MCNC: 11.6 G/DL (ref 11.5–15.5)
IMM GRANULOCYTES # BLD: 0.03 10*3/MM3 (ref 0–0.03)
IMM GRANULOCYTES NFR BLD: 0.5 % (ref 0–0.6)
LYMPHOCYTES # BLD AUTO: 1.63 10*3/MM3 (ref 0.6–4.8)
LYMPHOCYTES NFR BLD AUTO: 26 % (ref 24–44)
MCH RBC QN AUTO: 28.4 PG (ref 27–31)
MCHC RBC AUTO-ENTMCNC: 32.7 G/DL (ref 32–36)
MCV RBC AUTO: 87 FL (ref 80–99)
MONOCYTES # BLD AUTO: 0.51 10*3/MM3 (ref 0–1)
MONOCYTES NFR BLD AUTO: 8.1 % (ref 0–12)
NEUTROPHILS # BLD AUTO: 4.03 10*3/MM3 (ref 1.5–8.3)
NEUTROPHILS NFR BLD AUTO: 64.3 % (ref 41–71)
PLATELET # BLD AUTO: 270 10*3/MM3 (ref 150–450)
PMV BLD AUTO: 9.4 FL (ref 6–12)
POTASSIUM BLD-SCNC: 4.7 MMOL/L (ref 3.5–5.5)
PROT SERPL-MCNC: 6.8 G/DL (ref 5.7–8.2)
RBC # BLD AUTO: 4.08 10*6/MM3 (ref 3.89–5.14)
SODIUM BLD-SCNC: 141 MMOL/L (ref 132–146)
WBC NRBC COR # BLD: 6.27 10*3/MM3 (ref 3.5–10.8)

## 2017-10-09 PROCEDURE — 86140 C-REACTIVE PROTEIN: CPT | Performed by: INTERNAL MEDICINE

## 2017-10-09 PROCEDURE — 36415 COLL VENOUS BLD VENIPUNCTURE: CPT | Performed by: INTERNAL MEDICINE

## 2017-10-09 PROCEDURE — 85652 RBC SED RATE AUTOMATED: CPT | Performed by: INTERNAL MEDICINE

## 2017-10-09 PROCEDURE — 85025 COMPLETE CBC W/AUTO DIFF WBC: CPT | Performed by: INTERNAL MEDICINE

## 2017-10-09 PROCEDURE — 80053 COMPREHEN METABOLIC PANEL: CPT | Performed by: INTERNAL MEDICINE

## 2017-10-09 RX ORDER — LIDOCAINE HYDROCHLORIDE 10 MG/ML
0.1 INJECTION, SOLUTION EPIDURAL; INFILTRATION; INTRACAUDAL; PERINEURAL ONCE AS NEEDED
Status: CANCELLED | OUTPATIENT
Start: 2017-10-09

## 2017-10-09 RX ORDER — ASPIRIN 81 MG/1
81 TABLET ORAL DAILY
Status: CANCELLED | OUTPATIENT
Start: 2017-10-10

## 2017-10-09 RX ORDER — SODIUM CHLORIDE 0.9 % (FLUSH) 0.9 %
1-10 SYRINGE (ML) INJECTION AS NEEDED
Status: CANCELLED | OUTPATIENT
Start: 2017-10-09

## 2017-10-09 RX ORDER — NITROGLYCERIN 0.4 MG/1
0.4 TABLET SUBLINGUAL
Status: CANCELLED | OUTPATIENT
Start: 2017-10-09

## 2017-10-09 RX ORDER — ASPIRIN 81 MG/1
324 TABLET, CHEWABLE ORAL ONCE
Status: CANCELLED | OUTPATIENT
Start: 2017-10-09 | End: 2017-10-09

## 2017-10-09 RX ORDER — ONDANSETRON 2 MG/ML
4 INJECTION INTRAMUSCULAR; INTRAVENOUS EVERY 6 HOURS PRN
Status: CANCELLED | OUTPATIENT
Start: 2017-10-09

## 2017-10-17 ENCOUNTER — HOSPITAL ENCOUNTER (OUTPATIENT)
Facility: HOSPITAL | Age: 72
Discharge: HOME | End: 2017-10-17
Attending: INTERNAL MEDICINE | Admitting: INTERNAL MEDICINE

## 2017-10-17 VITALS
RESPIRATION RATE: 18 BRPM | HEART RATE: 48 BPM | HEIGHT: 65 IN | OXYGEN SATURATION: 94 % | WEIGHT: 166.89 LBS | BODY MASS INDEX: 27.81 KG/M2 | DIASTOLIC BLOOD PRESSURE: 61 MMHG | SYSTOLIC BLOOD PRESSURE: 109 MMHG

## 2017-10-17 DIAGNOSIS — R77.8 ELEVATED TROPONIN: ICD-10-CM

## 2017-10-17 DIAGNOSIS — R77.8 ELEVATED TROPONIN LEVEL: ICD-10-CM

## 2017-10-17 LAB
ANION GAP SERPL CALCULATED.3IONS-SCNC: 10 MMOL/L (ref 3–11)
ARTICHOKE IGE QN: 148 MG/DL (ref 0–130)
BUN BLD-MCNC: 20 MG/DL (ref 9–23)
BUN/CREAT SERPL: 25 (ref 7–25)
CALCIUM SPEC-SCNC: 9.3 MG/DL (ref 8.7–10.4)
CHLORIDE SERPL-SCNC: 105 MMOL/L (ref 99–109)
CHOLEST SERPL-MCNC: 210 MG/DL (ref 0–200)
CO2 SERPL-SCNC: 26 MMOL/L (ref 20–31)
CREAT BLD-MCNC: 0.8 MG/DL (ref 0.6–1.3)
DEPRECATED RDW RBC AUTO: 42.3 FL (ref 37–54)
ERYTHROCYTE [DISTWIDTH] IN BLOOD BY AUTOMATED COUNT: 13.1 % (ref 11.3–14.5)
GFR SERPL CREATININE-BSD FRML MDRD: 71 ML/MIN/1.73
GLUCOSE BLD-MCNC: 162 MG/DL (ref 70–100)
HCT VFR BLD AUTO: 35.8 % (ref 34.5–44)
HDLC SERPL-MCNC: 44 MG/DL (ref 40–60)
HGB BLD-MCNC: 11.5 G/DL (ref 11.5–15.5)
MCH RBC QN AUTO: 28 PG (ref 27–31)
MCHC RBC AUTO-ENTMCNC: 32.1 G/DL (ref 32–36)
MCV RBC AUTO: 87.1 FL (ref 80–99)
PLATELET # BLD AUTO: 224 10*3/MM3 (ref 150–450)
PMV BLD AUTO: 10.5 FL (ref 6–12)
POTASSIUM BLD-SCNC: 3.8 MMOL/L (ref 3.5–5.5)
RBC # BLD AUTO: 4.11 10*6/MM3 (ref 3.89–5.14)
SODIUM BLD-SCNC: 141 MMOL/L (ref 132–146)
TRIGL SERPL-MCNC: 134 MG/DL (ref 0–150)
WBC NRBC COR # BLD: 4.7 10*3/MM3 (ref 3.5–10.8)

## 2017-10-17 PROCEDURE — 93458 L HRT ARTERY/VENTRICLE ANGIO: CPT | Performed by: INTERNAL MEDICINE

## 2017-10-17 PROCEDURE — 25010000002 MIDAZOLAM PER 1 MG: Performed by: INTERNAL MEDICINE

## 2017-10-17 PROCEDURE — C1894 INTRO/SHEATH, NON-LASER: HCPCS | Performed by: INTERNAL MEDICINE

## 2017-10-17 PROCEDURE — 25010000002 FENTANYL CITRATE (PF) 100 MCG/2ML SOLUTION: Performed by: INTERNAL MEDICINE

## 2017-10-17 PROCEDURE — 85027 COMPLETE CBC AUTOMATED: CPT | Performed by: NURSE PRACTITIONER

## 2017-10-17 PROCEDURE — 36415 COLL VENOUS BLD VENIPUNCTURE: CPT

## 2017-10-17 PROCEDURE — C1769 GUIDE WIRE: HCPCS | Performed by: INTERNAL MEDICINE

## 2017-10-17 PROCEDURE — 80048 BASIC METABOLIC PNL TOTAL CA: CPT | Performed by: NURSE PRACTITIONER

## 2017-10-17 PROCEDURE — 0 IOPAMIDOL PER 1 ML: Performed by: INTERNAL MEDICINE

## 2017-10-17 PROCEDURE — 80061 LIPID PANEL: CPT | Performed by: NURSE PRACTITIONER

## 2017-10-17 PROCEDURE — 25010000002 HEPARIN (PORCINE) PER 1000 UNITS: Performed by: INTERNAL MEDICINE

## 2017-10-17 PROCEDURE — 93005 ELECTROCARDIOGRAM TRACING: CPT | Performed by: NURSE PRACTITIONER

## 2017-10-17 RX ORDER — ATORVASTATIN CALCIUM 40 MG/1
40 TABLET, FILM COATED ORAL DAILY
Qty: 30 TABLET | Refills: 11 | Status: SHIPPED | OUTPATIENT
Start: 2017-10-17 | End: 2018-04-30 | Stop reason: ALTCHOICE

## 2017-10-17 RX ORDER — LIDOCAINE HYDROCHLORIDE 10 MG/ML
0.1 INJECTION, SOLUTION EPIDURAL; INFILTRATION; INTRACAUDAL; PERINEURAL ONCE AS NEEDED
Status: DISCONTINUED | OUTPATIENT
Start: 2017-10-17 | End: 2017-10-17 | Stop reason: HOSPADM

## 2017-10-17 RX ORDER — ONDANSETRON 2 MG/ML
4 INJECTION INTRAMUSCULAR; INTRAVENOUS EVERY 6 HOURS PRN
Status: DISCONTINUED | OUTPATIENT
Start: 2017-10-17 | End: 2017-10-17 | Stop reason: HOSPADM

## 2017-10-17 RX ORDER — MELATONIN
1000 DAILY
COMMUNITY
End: 2021-08-11

## 2017-10-17 RX ORDER — NITROGLYCERIN 0.4 MG/1
0.4 TABLET SUBLINGUAL
Status: DISCONTINUED | OUTPATIENT
Start: 2017-10-17 | End: 2017-10-17 | Stop reason: HOSPADM

## 2017-10-17 RX ORDER — SODIUM CHLORIDE 0.9 % (FLUSH) 0.9 %
1-10 SYRINGE (ML) INJECTION AS NEEDED
Status: DISCONTINUED | OUTPATIENT
Start: 2017-10-17 | End: 2017-10-17 | Stop reason: HOSPADM

## 2017-10-17 RX ORDER — ASPIRIN 81 MG/1
81 TABLET ORAL DAILY
Status: DISCONTINUED | OUTPATIENT
Start: 2017-10-18 | End: 2017-10-17 | Stop reason: HOSPADM

## 2017-10-17 RX ORDER — FENTANYL CITRATE 50 UG/ML
INJECTION, SOLUTION INTRAMUSCULAR; INTRAVENOUS AS NEEDED
Status: DISCONTINUED | OUTPATIENT
Start: 2017-10-17 | End: 2017-10-17 | Stop reason: HOSPADM

## 2017-10-17 RX ORDER — SODIUM CHLORIDE 9 MG/ML
INJECTION, SOLUTION INTRAVENOUS CONTINUOUS PRN
Status: DISCONTINUED | OUTPATIENT
Start: 2017-10-17 | End: 2017-10-17 | Stop reason: HOSPADM

## 2017-10-17 RX ORDER — MIDAZOLAM HYDROCHLORIDE 1 MG/ML
INJECTION INTRAMUSCULAR; INTRAVENOUS AS NEEDED
Status: DISCONTINUED | OUTPATIENT
Start: 2017-10-17 | End: 2017-10-17 | Stop reason: HOSPADM

## 2017-10-17 RX ORDER — SODIUM CHLORIDE 9 MG/ML
1-3 INJECTION, SOLUTION INTRAVENOUS CONTINUOUS
Status: DISCONTINUED | OUTPATIENT
Start: 2017-10-17 | End: 2017-10-17 | Stop reason: HOSPADM

## 2017-10-17 RX ORDER — LACTOBACILLUS ACIDOPHILUS 500MM CELL
2 CAPSULE ORAL EVERY MORNING
COMMUNITY
End: 2018-04-30

## 2017-10-17 RX ORDER — ASPIRIN 81 MG/1
324 TABLET, CHEWABLE ORAL ONCE
Status: COMPLETED | OUTPATIENT
Start: 2017-10-17 | End: 2017-10-17

## 2017-10-17 RX ORDER — ALPRAZOLAM 0.5 MG/1
1 TABLET ORAL ONCE
Status: COMPLETED | OUTPATIENT
Start: 2017-10-17 | End: 2017-10-17

## 2017-10-17 RX ADMIN — SODIUM CHLORIDE 3 ML/KG/HR: 9 INJECTION, SOLUTION INTRAVENOUS at 10:00

## 2017-10-17 RX ADMIN — ASPIRIN 81 MG 243 MG: 81 TABLET ORAL at 09:59

## 2017-10-17 RX ADMIN — ALPRAZOLAM 1 MG: 0.5 TABLET ORAL at 10:06

## 2017-10-17 NOTE — INTERVAL H&P NOTE
At present patient is very anxious.  She does endorse some dyspnea on exertion but has not experienced any chest pain or pressure.  No changes in exam.     Opal Chavira PA-C  10/17/17  9:55 AM

## 2017-10-17 NOTE — PLAN OF CARE
Problem: Patient Care Overview (Adult)  Goal: Plan of Care Review  Outcome: Outcome(s) achieved Date Met:  10/17/17    10/17/17 8066   Coping/Psychosocial Response Interventions   Plan Of Care Reviewed With patient   Patient Care Overview   Progress no change   Outcome Evaluation   Outcome Summary/Follow up Plan Patient's site stable at time of discharge. The patient is being dishcarged home with family.        Goal: Adult Individualization and Mutuality  Outcome: Outcome(s) achieved Date Met:  10/17/17  Goal: Discharge Needs Assessment  Outcome: Outcome(s) achieved Date Met:  10/17/17    Problem: Cardiac Catheterization with/without PCI (Adult)  Goal: Signs and Symptoms of Listed Potential Problems Will be Absent or Manageable (Cardiac Catheterization with/without PCI)  Outcome: Outcome(s) achieved Date Met:  10/17/17

## 2017-10-17 NOTE — PLAN OF CARE
Problem: Patient Care Overview (Adult)  Goal: Plan of Care Review  Outcome: Ongoing (interventions implemented as appropriate)  ORIENTED TO ROOM AND UNIT UPON ADMISSION TO CV    10/17/17 0850   Patient Care Overview   Progress no change         Problem: Cardiac Catheterization with/without PCI (Adult)  Goal: Signs and Symptoms of Listed Potential Problems Will be Absent or Manageable (Cardiac Catheterization with/without PCI)  Outcome: Ongoing (interventions implemented as appropriate)  PT. HERE FOR HEART CATH; PROCEDURE TEACHING DONE AT [facility] PER SAMIRA MARTINEZ FOR DR. DAVIDSON    10/17/17 0924   Cardiac Catheterization with/without PCI   Problems Assessed (Cardiac Catheterization) other (see comments)   Problems Present (Cardiac Catheterization) other (see comments)

## 2017-10-17 NOTE — H&P (VIEW-ONLY)
"[facility] Daily Note       LOS: 5 days   Patient Care Team:  Daron Dotson MD as PCP - General  Daron Dotson MD as PCP - Family Medicine    Chief Complaint: Elevated troponin     Subjective:   Denies chest pain, dyspnea    Review of Systems:   As above.    Medications:    aspirin 81 mg Oral Daily   ceftriaxone 2 g Intravenous Q24H   clopidogrel 75 mg Oral Daily   heparin (porcine) 5,000 Units Subcutaneous Q8H   insulin lispro 0-7 Units Subcutaneous 4x Daily AC & at Bedtime   [START ON 10/4/2017] lisinopril 5 mg Oral Q24H   metoprolol tartrate 50 mg Oral Q12H       Vital Sign Min/Max for last 24 hours  Temp  Min: 98.4 °F (36.9 °C)  Max: 98.9 °F (37.2 °C)   BP  Min: 141/76  Max: 147/76   Pulse  Min: 52  Max: 64   Resp  Min: 16  Max: 18   SpO2  Min: 88 %  Max: 95 %   Flow (L/min)  Min: 2  Max: 2   Weight  Min: 170 lb 12.8 oz (77.5 kg)  Max: 170 lb 12.8 oz (77.5 kg)      Intake/Output Summary (Last 24 hours) at 10/03/17 1317  Last data filed at 10/03/17 1147   Gross per 24 hour   Intake              890 ml   Output              600 ml   Net              290 ml        Flowsheet Rows         First Filed Value    Admission Height  65.5\" (166.4 cm) Documented at 09/28/2017 1529    Admission Weight  168 lb (76.2 kg) Documented at 09/28/2017 1529          Physical Exam:    GENERAL: well-developed, well-nourished; in no acute distress.   LUNGS: Clear to auscultation bilaterally without wheezing, rhonchi, or rales noted.   CARDIOVASCULAR: The heart has a regular rate with a normal S1 and S2. There is no murmur, gallop, rub, or click appreciated.   PERIPHERAL VASCULAR:  There is no peripheral edema.      Results Review:    I reviewed the patient's new clinical results.    Tele:  NSR    Labs:      Results from last 7 days  Lab Units 10/02/17  0436 10/01/17  0443 09/30/17  2252 09/30/17  0541 09/29/17  0356 09/28/17  1601   SODIUM mmol/L 137 137  --  137 137 136   POTASSIUM mmol/L 3.8 3.3* 3.5 3.0* 3.6 3.0* "   CHLORIDE mmol/L 100 100  --  104 108 101   CO2 mmol/L 30.0 29.0  --  22.0 21.0 28.0   BUN mg/dL 14 14  --  15 17 16   CREATININE mg/dL 0.70 0.60  --  0.80 1.10 1.00   CALCIUM mg/dL 8.7 8.0*  --  8.3* 7.4* 9.1   BILIRUBIN mg/dL  --   --   --   --  0.3 0.7   ALK PHOS U/L  --   --   --   --  41 56   ALT (SGPT) U/L  --   --   --   --  16 16   AST (SGOT) U/L  --   --   --   --  25 21   GLUCOSE mg/dL 124* 140*  --  162* 200* 200*       Results from last 7 days  Lab Units 10/02/17  0436 10/01/17  0443 09/30/17  2252   WBC 10*3/mm3 6.24 7.50 8.01   HEMOGLOBIN g/dL 10.0* 9.9* 10.3*   HEMATOCRIT % 31.0* 29.8* 31.1*   PLATELETS 10*3/mm3 130* 98* 91*     Lab Results   Component Value Date    TROPONINI 0.349 (H) 10/01/2017    TROPONINI 0.756 (H) 09/30/2017    TROPONINI 1.430 (C) 09/29/2017     Lab Results   Component Value Date    CHOL 152 09/29/2017     Lab Results   Component Value Date    TRIG 108 09/29/2017     Lab Results   Component Value Date    HDL 50 09/29/2017     No results found for: LDLCALC  Lab Results   Component Value Date    INR 1.07 09/29/2017    PROTIME 11.7 (H) 09/29/2017       Ejection Fraction:  55-60% per echo      Assessment:  1. Elevated Troponin in setting of sepsis and hypotension; likely demand ischemia.    -No chest pain or concerning anginal equivalents currently or in recent history.    -Has risk factors for CAD.    -S/p 48 hours Heparin  2. Hypotension: Improved  3. Sepsis/Bactremia/UTI- ID following  4. DM  5. CKD Stage III, Cr currently 0.8  6. Anemia  7. Intermittent SVT - new as of 9/30 AM; normalized after Lasix and beta blocker; no further significant SVT noted; brief, not long lasting; will not assign a diagnosis of atrial fibrillation at this time since the runs were all less than 30 seconds long  8. Pulmonary congestion/Acute diastolic HF - noted on exam and CXR 9/30; likely exacerbated by underlying infection, fluid administration and underlying diastolic HF      Plan:  -Okay for  discharge from cardiac standpoint on ASA, clopidogrel, statin, metoprolol  -[facility] on or around 10/17; right radial artery access; cardiology  to call patient to confirm  -Patient reluctant to take statin; LDL 68; will hold off until [facility]; if with CAD, will restart statin    Oral Velazco MD, MSc, [facility]  Interventional Cardiology  [facility]

## 2017-11-07 ENCOUNTER — HOSPITAL ENCOUNTER (OUTPATIENT)
Dept: MAMMOGRAPHY | Facility: HOSPITAL | Age: 72
Discharge: HOME | End: 2017-11-07
Attending: OBSTETRICS & GYNECOLOGY | Admitting: OBSTETRICS & GYNECOLOGY

## 2017-11-07 DIAGNOSIS — Z12.31 VISIT FOR SCREENING MAMMOGRAM: ICD-10-CM

## 2017-11-07 PROCEDURE — 77063 BREAST TOMOSYNTHESIS BI: CPT | Performed by: RADIOLOGY

## 2017-11-07 PROCEDURE — G0202 SCR MAMMO BI INCL CAD: HCPCS | Performed by: RADIOLOGY

## 2017-11-07 PROCEDURE — G0202 SCR MAMMO BI INCL CAD: HCPCS

## 2017-11-07 PROCEDURE — 77063 BREAST TOMOSYNTHESIS BI: CPT

## 2018-01-10 ENCOUNTER — OFFICE VISIT (OUTPATIENT)
Dept: ORTHOPEDIC SURGERY | Facility: CLINIC | Age: 73
End: 2018-01-10

## 2018-01-10 VITALS
HEIGHT: 65 IN | WEIGHT: 164.46 LBS | HEART RATE: 52 BPM | SYSTOLIC BLOOD PRESSURE: 175 MMHG | DIASTOLIC BLOOD PRESSURE: 94 MMHG | BODY MASS INDEX: 27.4 KG/M2

## 2018-01-10 DIAGNOSIS — S83.271A COMPLEX TEAR OF LATERAL MENISCUS OF RIGHT KNEE AS CURRENT INJURY, INITIAL ENCOUNTER: ICD-10-CM

## 2018-01-10 DIAGNOSIS — S83.231A COMPLEX TEAR OF MEDIAL MENISCUS OF RIGHT KNEE AS CURRENT INJURY, INITIAL ENCOUNTER: Primary | ICD-10-CM

## 2018-01-10 PROCEDURE — 99204 OFFICE O/P NEW MOD 45 MIN: CPT | Performed by: ORTHOPAEDIC SURGERY

## 2018-01-10 RX ORDER — ROSUVASTATIN CALCIUM 5 MG/1
TABLET, COATED ORAL EVERY OTHER DAY
COMMUNITY
Start: 2018-01-08

## 2018-01-10 NOTE — PROGRESS NOTES
[facility] Note    Subjective     Chief Complaint   Patient presents with   • Right Knee - Pain        HPI      Dianne Barry is a 72 y.o. female.  She has chronic right knee symptoms.  She recently had an MRI which showed medial and lateral meniscus tears.  Her knee is stabbing and painful.  It gives out on her she never knows what is going to give out.  She's tried anti-inflammatories.        Past Medical History:   Diagnosis Date   • Arthritis    • Bacterial UTI 2017    HOSPITALIZED FOR SEPSIS   • Cancer    • Cataracts, bilateral    • Diabetes mellitus    • Gall stones    • Hip fracture, right    • History of transfusion    • Hypertension       Past Surgical History:   Procedure Laterality Date   • ABDOMINAL SURGERY     • CARDIAC CATHETERIZATION N/A 10/17/2017    Procedure: Left Heart Cath;  Surgeon: Oral Velazco MD;  Location: [facility] INVASIVE LOC[facility];  Service:    • CATARACT EXTRACTION     •  SECTION     • COLON SURGERY     • COLONOSCOPY     • CYSTOSCOPY     • EYE SURGERY     • FRACTURE SURGERY     • HYSTERECTOMY     • MULTIPLE TOOTH EXTRACTIONS     • OOPHORECTOMY     • TUBAL ABDOMINAL LIGATION        Family History   Problem Relation Age of Onset   • Heart disease Mother    • Hyperlipidemia Mother    • Hypertension Mother    • Diabetes Mother    • Heart attack Mother    • Heart attack Father    • Depression Other    • Breast cancer Neg Hx    • Ovarian cancer Neg Hx      Social History     Social History   • Marital status:      Spouse name: N/A   • Number of children: N/A   • Years of education: N/A     Occupational History   • Not on file.     Social History Main Topics   • Smoking status: Never Smoker   • Smokeless tobacco: Never Used   • Alcohol use Yes      Comment: 4 times per month, 1 drink   • Drug use: No   • Sexual activity: Defer     Other Topics Concern   • Not on file     Social History Narrative      Current Outpatient Prescriptions on File Prior to  Visit   Medication Sig Dispense Refill   • Acidophilus Lactobacillus capsule Take 2 capsules by mouth Every Morning.     • aspirin 81 MG EC tablet Take 81 mg by mouth Every Morning.     • atorvastatin (LIPITOR) 40 MG tablet Take 1 tablet by mouth Daily. 30 tablet 11   • B Complex Vitamins (VITAMIN-B COMPLEX PO) Take 1 capsule by mouth Daily.     • BIOTIN PO Take 1 capsule by mouth Daily.     • calcium citrate-vitamin d (CITRACAL) 200-250 MG-UNIT tablet tablet Take 2 tablets by mouth 2 (Two) Times a Day.     • cholecalciferol (VITAMIN D3) 1000 units tablet Take 1,000 Units by mouth Daily.     • CINNAMON PO Take 2 capsules by mouth Daily.     • glucosamine sulfate 500 MG capsule capsule Take 1,000 mg by mouth Daily.     • lisinopril (PRINIVIL,ZESTRIL) 10 MG tablet Take 10 mg by mouth Every Morning.     • metoprolol tartrate (LOPRESSOR) 50 MG tablet Take 1 tablet by mouth Every 12 (Twelve) Hours. 180 tablet 3   • Omega-3 Fatty Acids (FISH OIL) 1000 MG capsule capsule Take 2,000 mg by mouth 2 (Two) Times a Day With Meals.     • Testosterone Propionate (FIRST-TESTOSTERONE) 2 % ointment Place 1 application on the skin Every Morning. PLACES ON INNER THIGH     • vitamin C (ASCORBIC ACID) 500 MG tablet Take 500 mg by mouth Daily.       No current facility-administered medications on file prior to visit.       Allergies   Allergen Reactions   • Darvon [Propoxyphene] Hallucinations     And DARVOCET        The following portions of the patient's history were reviewed and updated as appropriate: allergies, current medications, past family history, past medical history, past social history, past surgical history and problem list.    Review of Systems   Constitutional: Negative.    HENT: Negative.    Eyes: Negative.    Respiratory: Negative.    Cardiovascular: Negative.    Gastrointestinal: Negative.    Endocrine: Negative.    Genitourinary: Negative.    Musculoskeletal: Positive for arthralgias.   Skin: Negative.   "  Allergic/Immunologic: Negative.    Neurological: Negative.    Hematological: Negative.    Psychiatric/Behavioral: Negative.         Objective      Physical Exam  /94  Pulse 52 Comment: Patient says she is nervous  Ht 165 cm (64.96\")  Wt 74.6 kg (164 lb 7.4 oz)  BMI 27.4 kg/m2    Body mass index is 27.4 kg/(m^2).        GENERAL APPEARANCE: awake, alert & oriented x 3, in no acute distress and well developed, well nourished  PSYCH: normal mood andaffect  LUNGS:  breathing nonlabored, no wheezing  EYES: sclera anicteric, pupils equal  CARDIOVASCULAR: palpable pulses dorsalis pedis, palpable posterior tibial bilaterally. Capillary refill less than 2 seconds  INTEGUMENTARY: skin intact, no clubbing, cyanosis  NEUROLOGIC:  Normal gait and balance            Ortho Exam  Peripheral Vascular:    Upper Extremity:   Inspection:  Left--no cyanotic nail beds Right--no cyanotic nail beds   Bilateral:  Pink nail beds with brisk capillary refill   Palpation:  Bilateral radial pulse normal    Musculoskeletal:  Global Assessment:  Overall assessment of Lower Extremity Muscle Strength and Tone:    Right quadriceps--5/5  Right hamstrings--5/5  Right tibialis anterior--5/5  Right gastroc soleus--5/5  Right EHL--5/5    Lower Extremity:  Knee/Patella:  No digital clubbing or cyanosis.    Examination of right knee reveals:  Normal deep tendon reflexes, coordination, strength, tone, sensation.  No known fractures or deformities.    Inspection and Palpation:      Right knee:  Tenderness:  Medial and lateral joint line  Effusion:  none  Crepitus:  none  Pulses:  2+  Ecchymosis:  None  Warmth:  None     ROM:  Right:  Extension:0    Flexion:135  Left:  Extension:0     Flexion:135    Instability:      Right:  Lachman Test:  Negative, Varus stress test negative, Valgus stress test negative   Anterior Drawer Test:  Negative, Posterior Drawer Test:  Negative    Deformities/Malalignments/Discrepancies:    Left:  none  Right:  " none    Functional Testing:  Right:  Franny's test:  Positive with pain  Patella grind test:  Negative  Q-angle:  Normal  Apprehension Sign:  Negative        Imaging/Studies  Imaging Results (last 24 hours)     ** No results found for the last 24 hours. **        I reviewed the MRI which shows a medial and lateral meniscus tear  Assessment/Plan      Dianne was seen today for pain.    Diagnoses and all orders for this visit:    Complex tear of medial meniscus of right knee as current injury, initial encounter    Complex tear of lateral meniscus of right knee as current injury, initial encounter  I recommend right knee arthroscopy with partial medial and lateral meniscectomy.  She wants to discuss this with her significant other.  She will call back if she wants to schedule.    Medical Decision Making  Management Options : over-the-counter medicine and major surgery with risk factors  Data/Risk: radiology tests and independent visualization of imaging, lab tests, or EMG/NCV    Reyes Holland MD  01/10/18  8:53 AM

## 2018-01-11 RX ORDER — CLOPIDOGREL BISULFATE 75 MG/1
75 TABLET ORAL DAILY
COMMUNITY
End: 2018-04-30

## 2018-04-24 NOTE — PROGRESS NOTES
[facility] AT [address], [address]  [place], [place], [address] [phone]  [email or web address]           OUTPATIENT CLINIC FOLLOW UP NOTE    Patient Care Team:  Patient Care Team:  Daron Dotson MD as PCP - General  Daron Dotson MD as PCP - Family Medicine    Subjective:      Chief Complaint   Patient presents with   • Fatigue       HPI:    Dianne Barry is a 72 y.o. female.  History of Present Illness    History of mild CAD and diabetes mellitus.  Presents today for follow-up.    Since she was discharged from the hospital that patient reports doing well.  She has had instances of dyspnea when climbing inclines.  She was treated for C. Diff since discharge.  She denies any chest pain, palpitations, lower extremity edema, orthopnea, PND, or syncope.    Review of Systems:  Positive for dyspnea with exertion.   Negative for exertional chest pain, orthopnea, PND, lower extremity edema, palpitations, lightheadedness, syncope.    PFSH:  Patient Active Problem List   Diagnosis   • Severe sepsis   • UTI (urinary tract infection)   • Diabetes mellitus   • Elevated troponin   • Chronic diastolic congestive heart failure   • Coronary artery disease involving native coronary artery of native heart without angina pectoris         Current Outpatient Prescriptions:   •  aspirin 81 MG EC tablet, Take 81 mg by mouth Every Morning., Disp: , Rfl:   •  B Complex Vitamins (VITAMIN-B COMPLEX PO), Take 1 capsule by mouth Daily., Disp: , Rfl:   •  BIOTIN PO, Take 1 capsule by mouth Daily., Disp: , Rfl:   •  calcium citrate-vitamin d (CITRACAL) 200-250 MG-UNIT tablet tablet, Take 2 tablets by mouth 2 (Two) Times a Day., Disp: , Rfl:   •  cholecalciferol (VITAMIN D3) 1000 units tablet, Take 1,000 Units by mouth Daily., Disp: , Rfl:   •  CINNAMON PO, Take 2 capsules by mouth Daily., Disp: , Rfl:   •  glucosamine sulfate 500 MG capsule capsule, Take 1,000 mg by mouth Daily., Disp: , Rfl:  "  •  meclizine 25 MG chewable tablet chewable tablet, Chew 25 mg As Needed., Disp: , Rfl:   •  meloxicam (MOBIC) 7.5 MG tablet, Take 7.5 mg by mouth As Needed., Disp: , Rfl:   •  metoprolol tartrate (LOPRESSOR) 50 MG tablet, Take 1 tablet by mouth Every 12 (Twelve) Hours., Disp: 180 tablet, Rfl: 3  •  Omega-3 Fatty Acids (FISH OIL) 1000 MG capsule capsule, Take 2,000 mg by mouth 2 (Two) Times a Day With Meals., Disp: , Rfl:   •  rosuvastatin (CRESTOR) 5 MG tablet, Every Other Day., Disp: , Rfl:   •  Testosterone Propionate (FIRST-TESTOSTERONE) 2 % ointment, Place 1 application on the skin Every Morning. PLACES ON INNER THIGH, Disp: , Rfl:   •  vitamin C (ASCORBIC ACID) 500 MG tablet, Take 1,000 mg by mouth Daily., Disp: , Rfl:     Allergies   Allergen Reactions   • Darvon [Propoxyphene] Hallucinations     And DARVOCET   • Atorvastatin Myalgia        reports that she has never smoked. She has never used smokeless tobacco.    Family History   Problem Relation Age of Onset   • Heart disease Mother    • Hyperlipidemia Mother    • Hypertension Mother    • Diabetes Mother    • Heart attack Mother    • Heart attack Father    • Depression Other    • Breast cancer Neg Hx    • Ovarian cancer Neg Hx          Objective:   Blood pressure 128/70, pulse 50, height 166.4 cm (65.5\"), weight 73.9 kg (163 lb).  CONSTITUTIONAL: No acute distress, normal affect  RESPIRATORY: Normal effort. Clear to auscultation bilaterally without wheezing or rales  CARDIOVASCULAR: Carotids with normal upstrokes without bruits.  Regular rate and rhythm with normal S1 and S2. Without murmur, gallop or rub.  PERIPHERAL VASCULAR: Normal radial pulses bilaterally. There is no lower extremity edema bilaterally.    Labs:  Lab Results   Component Value Date    ALT 25 10/09/2017    AST 19 10/09/2017     Lab Results   Component Value Date    CHOL 210 (H) 10/17/2017    TRIG 134 10/17/2017    HDL 44 10/17/2017    CREATININE 0.80 10/17/2017       No components " found for: LDLDIRECTC  Lab Results   Component Value Date     (H) 10/17/2017    LDL 68 09/29/2017     No results found for: LDLHDL    Diagnostic Data:    Procedures    TTE 9/2017  · Left ventricular systolic function is normal. Estimated EF appears to be in the range of 56 - 60%.  · Left ventricular wall thickness is consistent with mild septal asymmetric hypertrophy.  · Left ventricular diastolic dysfunction (grade II) consistent with pseudonormalization.  · Mild tricuspid valve regurgitation is present.  · Estimated right ventricular systolic pressure from tricuspid regurgitation is mildly elevated (35-45 mmHg).    [facility] 10/2017  · There is mild diffuse coronary artery disease without obstructive flow limiting stenoses  · Normal left ventricular ejection fraction with no regional wall motion abnormalities.    Assessment and Plan:   Dianne was seen today for fatigue.    Diagnoses and all orders for this visit:    Chronic diastolic congestive heart failure  -NYHA class I, if patient worsening dyspnea with exertion and/or develops swelling or other HF symptoms, would consider Lasix, could start with PRN    Coronary artery disease involving native coronary artery of native heart without angina pectoris  -Continue ASA, beta blocker, and statin.    - Dietary and exercise counseling was provided during this visit  - Return if symptoms worsen or fail to improve.    KEISHA Mcgraw obtained past medical, family history, social history, review of systems and functioned as a scribe for the remainder of the dictation for Dr. Velazco.    I, Oral Velazco MD, personally performed the services as scribed by the above named individual. I have made any necessary edits and it is both accurate and complete.     Oral Vleazco MD, MSc, FACC  Interventional Cardiology  [facility]

## 2018-04-30 ENCOUNTER — OFFICE VISIT (OUTPATIENT)
Dept: CARDIOLOGY | Facility: CLINIC | Age: 73
End: 2018-04-30

## 2018-04-30 VITALS
WEIGHT: 163 LBS | HEIGHT: 66 IN | SYSTOLIC BLOOD PRESSURE: 128 MMHG | DIASTOLIC BLOOD PRESSURE: 70 MMHG | BODY MASS INDEX: 26.2 KG/M2 | HEART RATE: 50 BPM

## 2018-04-30 DIAGNOSIS — I50.32 CHRONIC DIASTOLIC CONGESTIVE HEART FAILURE (HCC): Primary | ICD-10-CM

## 2018-04-30 DIAGNOSIS — I25.10 CORONARY ARTERY DISEASE INVOLVING NATIVE CORONARY ARTERY OF NATIVE HEART WITHOUT ANGINA PECTORIS: ICD-10-CM

## 2018-04-30 PROCEDURE — 99213 OFFICE O/P EST LOW 20 MIN: CPT | Performed by: INTERNAL MEDICINE

## 2018-04-30 RX ORDER — MELOXICAM 7.5 MG/1
7.5 TABLET ORAL AS NEEDED
COMMUNITY

## 2018-04-30 RX ORDER — MECLIZINE HCL 25MG 25 MG/1
25 TABLET, CHEWABLE ORAL AS NEEDED
COMMUNITY

## 2018-10-11 ENCOUNTER — TRANSCRIBE ORDERS (OUTPATIENT)
Dept: ADMINISTRATIVE | Facility: HOSPITAL | Age: 73
End: 2018-10-11

## 2018-10-11 DIAGNOSIS — Z12.31 VISIT FOR SCREENING MAMMOGRAM: Primary | ICD-10-CM

## 2018-12-03 ENCOUNTER — HOSPITAL ENCOUNTER (OUTPATIENT)
Dept: MAMMOGRAPHY | Facility: HOSPITAL | Age: 73
Discharge: HOME | End: 2018-12-03
Attending: OBSTETRICS & GYNECOLOGY | Admitting: OBSTETRICS & GYNECOLOGY

## 2018-12-03 DIAGNOSIS — Z12.31 VISIT FOR SCREENING MAMMOGRAM: ICD-10-CM

## 2018-12-03 PROCEDURE — 77067 SCR MAMMO BI INCL CAD: CPT

## 2018-12-03 PROCEDURE — 77063 BREAST TOMOSYNTHESIS BI: CPT | Performed by: RADIOLOGY

## 2018-12-03 PROCEDURE — 77067 SCR MAMMO BI INCL CAD: CPT | Performed by: RADIOLOGY

## 2018-12-03 PROCEDURE — 77063 BREAST TOMOSYNTHESIS BI: CPT

## 2018-12-10 ENCOUNTER — HOSPITAL ENCOUNTER (OUTPATIENT)
Dept: MAMMOGRAPHY | Facility: HOSPITAL | Age: 73
Discharge: HOME | End: 2018-12-10
Admitting: RADIOLOGY

## 2018-12-10 ENCOUNTER — HOSPITAL ENCOUNTER (OUTPATIENT)
Dept: ULTRASOUND IMAGING | Facility: HOSPITAL | Age: 73
Discharge: HOME | End: 2018-12-10

## 2018-12-10 DIAGNOSIS — R92.8 ABNORMAL MAMMOGRAM: ICD-10-CM

## 2018-12-10 PROCEDURE — G0279 TOMOSYNTHESIS, MAMMO: HCPCS

## 2018-12-10 PROCEDURE — 76642 ULTRASOUND BREAST LIMITED: CPT

## 2018-12-10 PROCEDURE — 77065 DX MAMMO INCL CAD UNI: CPT

## 2018-12-10 PROCEDURE — 76642 ULTRASOUND BREAST LIMITED: CPT | Performed by: RADIOLOGY

## 2018-12-10 PROCEDURE — 77065 DX MAMMO INCL CAD UNI: CPT | Performed by: RADIOLOGY

## 2018-12-10 PROCEDURE — G0279 TOMOSYNTHESIS, MAMMO: HCPCS | Performed by: RADIOLOGY

## 2019-04-09 ENCOUNTER — TRANSCRIBE ORDERS (OUTPATIENT)
Dept: ADMINISTRATIVE | Facility: HOSPITAL | Age: 74
End: 2019-04-09

## 2019-04-09 DIAGNOSIS — Z79.890 HX OF ONGOING TREATMENT WITH HORMONAL THERAPY: ICD-10-CM

## 2019-04-09 DIAGNOSIS — Z78.0 POSTMENOPAUSAL: Primary | ICD-10-CM

## 2019-04-09 DIAGNOSIS — Z79.890 HORMONE REPLACEMENT THERAPY: ICD-10-CM

## 2019-04-16 ENCOUNTER — HOSPITAL ENCOUNTER (OUTPATIENT)
Dept: BONE DENSITY | Facility: HOSPITAL | Age: 74
Discharge: HOME | End: 2019-04-16
Admitting: NURSE PRACTITIONER

## 2019-04-16 DIAGNOSIS — Z78.0 POSTMENOPAUSAL: ICD-10-CM

## 2019-04-16 PROCEDURE — 77080 DXA BONE DENSITY AXIAL: CPT

## 2019-11-13 ENCOUNTER — TRANSCRIBE ORDERS (OUTPATIENT)
Dept: ADMINISTRATIVE | Facility: HOSPITAL | Age: 74
End: 2019-11-13

## 2019-11-13 DIAGNOSIS — Z12.31 VISIT FOR SCREENING MAMMOGRAM: Primary | ICD-10-CM

## 2020-02-03 ENCOUNTER — HOSPITAL ENCOUNTER (OUTPATIENT)
Dept: MAMMOGRAPHY | Facility: HOSPITAL | Age: 75
Discharge: HOME | End: 2020-02-03
Admitting: NURSE PRACTITIONER

## 2020-02-03 DIAGNOSIS — Z12.31 VISIT FOR SCREENING MAMMOGRAM: ICD-10-CM

## 2020-02-03 PROCEDURE — 77063 BREAST TOMOSYNTHESIS BI: CPT | Performed by: RADIOLOGY

## 2020-02-03 PROCEDURE — 77067 SCR MAMMO BI INCL CAD: CPT | Performed by: RADIOLOGY

## 2020-02-03 PROCEDURE — 77063 BREAST TOMOSYNTHESIS BI: CPT

## 2020-02-03 PROCEDURE — 77067 SCR MAMMO BI INCL CAD: CPT

## 2020-12-23 ENCOUNTER — TRANSCRIBE ORDERS (OUTPATIENT)
Dept: ADMINISTRATIVE | Facility: HOSPITAL | Age: 75
End: 2020-12-23

## 2020-12-23 DIAGNOSIS — Z12.31 VISIT FOR SCREENING MAMMOGRAM: Primary | ICD-10-CM

## 2021-03-09 ENCOUNTER — APPOINTMENT (OUTPATIENT)
Dept: MAMMOGRAPHY | Facility: HOSPITAL | Age: 76
End: 2021-03-09

## 2021-05-17 ENCOUNTER — APPOINTMENT (OUTPATIENT)
Dept: MAMMOGRAPHY | Facility: HOSPITAL | Age: 76
End: 2021-05-17

## 2021-06-14 ENCOUNTER — HOSPITAL ENCOUNTER (OUTPATIENT)
Dept: MAMMOGRAPHY | Facility: HOSPITAL | Age: 76
Discharge: HOME | End: 2021-06-14
Admitting: NURSE PRACTITIONER

## 2021-06-14 DIAGNOSIS — Z12.31 VISIT FOR SCREENING MAMMOGRAM: ICD-10-CM

## 2021-06-14 PROCEDURE — 77063 BREAST TOMOSYNTHESIS BI: CPT

## 2021-06-14 PROCEDURE — 77067 SCR MAMMO BI INCL CAD: CPT

## 2021-06-14 PROCEDURE — 77067 SCR MAMMO BI INCL CAD: CPT | Performed by: RADIOLOGY

## 2021-06-14 PROCEDURE — 77063 BREAST TOMOSYNTHESIS BI: CPT | Performed by: RADIOLOGY

## 2021-08-11 ENCOUNTER — OFFICE VISIT (OUTPATIENT)
Dept: CARDIOLOGY | Facility: CLINIC | Age: 76
End: 2021-08-11

## 2021-08-11 VITALS
BODY MASS INDEX: 27.97 KG/M2 | DIASTOLIC BLOOD PRESSURE: 82 MMHG | SYSTOLIC BLOOD PRESSURE: 124 MMHG | HEIGHT: 66 IN | OXYGEN SATURATION: 93 % | WEIGHT: 174 LBS | HEART RATE: 53 BPM

## 2021-08-11 DIAGNOSIS — I25.10 CORONARY ARTERY DISEASE INVOLVING NATIVE CORONARY ARTERY OF NATIVE HEART WITHOUT ANGINA PECTORIS: ICD-10-CM

## 2021-08-11 DIAGNOSIS — I50.32 CHRONIC DIASTOLIC CONGESTIVE HEART FAILURE (HCC): Primary | ICD-10-CM

## 2021-08-11 PROCEDURE — 99203 OFFICE O/P NEW LOW 30 MIN: CPT | Performed by: PHYSICIAN ASSISTANT

## 2021-08-11 PROCEDURE — 93000 ELECTROCARDIOGRAM COMPLETE: CPT | Performed by: PHYSICIAN ASSISTANT

## 2021-08-11 RX ORDER — GLIMEPIRIDE 1 MG/1
1 TABLET ORAL DAILY
COMMUNITY
Start: 2021-06-17

## 2021-08-11 RX ORDER — ESTRADIOL 1 MG/1
1 TABLET ORAL DAILY
COMMUNITY
Start: 2021-07-26

## 2021-08-11 NOTE — PROGRESS NOTES
[facility] at [facility]  INITIAL OFFICE CONSULT      Dianne Barry  1945  PCP: Daron Dotson MD    SUBJECTIVE:   Dianne Barry is a 76 y.o. female seen for a consultation visit regarding the following:     Chief Complaint:   Chief Complaint   Patient presents with   • Surgical Clearance          Consultation is requested by No ref. provider found for evaluation of Surgical Clearance        History:  Pleasant 76 year old female presents for consult for pre op evaluation for knee surgery. She has followed in past by Dr. Velazco for CAD, HTN, Chronic diastolic dysfunction. She states since last seen by our office she has had no Chest pain suggesting angina. She is quit limited by her knee pain and left arm rotator cuff injury. She tries to stay active but does get fatigued easily and she feels this is due to deconditioning form being inactive from her knee pain. She denies any orthopnea, PND, or edema. She denies any palpitations, dizziness, near syncope. She states her BP is well controlled and she is tolerating her Statin medication. She tries to watch her diet for DM.       Cardiac PMH: (Old records have been reviewed and summarized below)  1. CAD  a. LHC 2017 mild diffuse CAD, No flow limiting stenoses, Normal EF-Medical therapy  b. TTE 9/2017 normal EF Mild LVH with mild septal asymmetric hypertrophy, diastolic dysfunction, Mild MR  2. Chronic Diastolic dysfunction  3. HTN: controlled on BB  4. Iatrogenic Bradycardia  5. HLD: Crestor      Past Medical History, Past Surgical History, Family history, Social History, and Medications were all reviewed with the patient today and updated as necessary.     Current Outpatient Medications   Medication Sig Dispense Refill   • aspirin 81 MG EC tablet Take 81 mg by mouth Every Morning.     • BIOTIN PO Take 1 capsule by mouth Daily.     • CINNAMON PO Take 2 capsules by mouth Daily.     • estradiol (ESTRACE) 1 MG tablet Take 1 tablet by mouth Daily.      • glimepiride (AMARYL) 1 MG tablet Take 1 mg by mouth Daily.     • glucosamine sulfate 500 MG capsule capsule Take 1,000 mg by mouth Daily.     • meclizine 25 MG chewable tablet chewable tablet Chew 25 mg As Needed.     • meloxicam (MOBIC) 7.5 MG tablet Take 7.5 mg by mouth As Needed.     • metoprolol tartrate (LOPRESSOR) 50 MG tablet Take 1 tablet by mouth Every 12 (Twelve) Hours. 180 tablet 3   • mupirocin (BACTROBAN) 2 % ointment 1 application into the nostril(s) as directed by provider Daily.     • Omega-3 Fatty Acids (FISH OIL) 1000 MG capsule capsule Take 2,000 mg by mouth Daily With Breakfast.     • rosuvastatin (CRESTOR) 5 MG tablet Every Other Day.     • Testosterone Propionate (FIRST-TESTOSTERONE) 2 % ointment Place 1 application on the skin Every Morning. PLACES ON INNER THIGH       No current facility-administered medications for this visit.     Allergies   Allergen Reactions   • Darvon [Propoxyphene] Hallucinations     And DARVOCET   • Atorvastatin Myalgia         Past Medical History:   Diagnosis Date   • Arthritis    • Bacterial UTI 2017    HOSPITALIZED FOR SEPSIS   • Cancer (CMS/HCC)    • Cataracts, bilateral    • Chronic diastolic congestive heart failure (CMS/HCC) 2018   • Coronary artery disease involving native coronary artery of native heart without angina pectoris 2018   • Diabetes mellitus (CMS/HCC)    • Gall stones    • Hip fracture, right (CMS/HCC)    • History of transfusion    • Hypertension      Past Surgical History:   Procedure Laterality Date   • ABDOMINAL SURGERY     • CARDIAC CATHETERIZATION N/A 10/17/2017    Procedure: Left Heart Cath;  Surgeon: Oral Velazco MD;  Location: [facility] INVASIVE LOCATION;  Service:    • CATARACT EXTRACTION     •  SECTION     • COLON SURGERY     • COLONOSCOPY     • CYSTOSCOPY     • EYE SURGERY     • FRACTURE SURGERY     • HYSTERECTOMY     • MULTIPLE TOOTH EXTRACTIONS     • OOPHORECTOMY     • TUBAL ABDOMINAL LIGATION       Family  "History   Problem Relation Age of Onset   • Heart disease Mother    • Hyperlipidemia Mother    • Hypertension Mother    • Diabetes Mother    • Heart attack Mother    • Heart attack Father    • Depression Other    • Breast cancer Neg Hx    • Ovarian cancer Neg Hx      Social History     Tobacco Use   • Smoking status: Never Smoker   • Smokeless tobacco: Never Used   Substance Use Topics   • Alcohol use: Yes     Comment: 4 times per month, 1 drink       ROS:  Review of Symptoms:  General: no recent weight loss/gain, weakness or fatigue  Skin: no rashes, lumps, or other skin changes  HEENT: no dizziness, lightheadedness, or vision changes  Respiratory: no cough or hemoptysis  Cardiovascular: no palpitations, and tachycardia  Gastrointestinal: no black/tarry stools or diarrhea  Urinary: no change in frequency or urgency  Peripheral Vascular: no claudication or leg cramps  Musculoskeletal: + OA knee pain  Psychiatric: no depression or excessive stress  Neurological: no sensory or motor loss, no syncope  Hematologic: no anemia, easy bruising or bleeding  Endocrine: no thyroid problems, nor heat or cold intolerance         PHYSICAL EXAM:   /82 (BP Location: Right arm, Patient Position: Sitting, Cuff Size: Adult)   Pulse 53   Ht 166.4 cm (65.5\")   Wt 78.9 kg (174 lb)   SpO2 93%   BMI 28.51 kg/m²      Wt Readings from Last 5 Encounters:   08/11/21 78.9 kg (174 lb)   04/30/18 73.9 kg (163 lb)   01/10/18 74.6 kg (164 lb 7.4 oz)   10/17/17 75.7 kg (166 lb 14.2 oz)   10/02/17 77.5 kg (170 lb 12.8 oz)     BP Readings from Last 5 Encounters:   08/11/21 124/82   04/30/18 128/70   01/10/18 175/94   10/17/17 109/61   10/03/17 145/86       General-Well Nourished, Well developed  Eyes - PERRLA  Neck- supple, No mass  CV- regular rate and rhythm, no MRG  Lung- clear bilaterally  Abd- soft, +BS  Musc/skel - Norm strength and range of motion  Skin- warm and dry  Neuro - Alert & Oriented x 3, appropriate mood.    Patient's " external notes were reviewed.  Independent interpretation of test performed by another physician in facility were reviewed.  Outside laboratory data was also reviewed.    Medical problems and test results were reviewed with the patient today.         Lab Results   Component Value Date    CHOL 210 (H) 10/17/2017    HDL 44 10/17/2017     (H) 10/17/2017       EKG:  (EKG/Tracing has been independently visualized by me and summarized below)      ECG 12 Lead    Date/Time: 8/11/2021 9:31 AM  Performed by: Artem Barber PA  Authorized by: Artem Barber PA   Rhythm: sinus bradycardia  Rate: bradycardic  Conduction: conduction normal  QRS axis: normal    Clinical impression: abnormal EKG            ASSESSMENT   1. Pre op evaluation  2. CAD: Mild diffuse plaque, No significant stenosis, Normal EF  3. Chronic Diastolic dysfunction  4. HLD: Crestor, Followed by Dr. Fernandez  5. Iatrogenic asymptomatic bradycardia  6. Glucose intolerance, DM Type II-HbA1c 6.5      PLAN  · Patient has no angina or CHF symptoms. EKG stable from previous EKG 2017. She is considered low CV risk for Knee surgery.   · Follow up with Dr. Velazco one year or sooner as needed.           Cardiology/Electrophysiology  08/11/21  09:23 EDT  Will Sunil CAMEJO

## 2022-03-16 ENCOUNTER — TELEPHONE (OUTPATIENT)
Dept: CARDIOLOGY | Facility: CLINIC | Age: 77
End: 2022-03-16

## 2022-03-16 ENCOUNTER — OFFICE VISIT (OUTPATIENT)
Dept: CARDIOLOGY | Facility: CLINIC | Age: 77
End: 2022-03-16

## 2022-03-16 VITALS
DIASTOLIC BLOOD PRESSURE: 78 MMHG | HEART RATE: 55 BPM | BODY MASS INDEX: 27.32 KG/M2 | HEIGHT: 66 IN | OXYGEN SATURATION: 95 % | SYSTOLIC BLOOD PRESSURE: 160 MMHG | WEIGHT: 170 LBS

## 2022-03-16 DIAGNOSIS — R06.02 SOB (SHORTNESS OF BREATH): Primary | ICD-10-CM

## 2022-03-16 DIAGNOSIS — I50.32 CHRONIC DIASTOLIC CONGESTIVE HEART FAILURE: ICD-10-CM

## 2022-03-16 DIAGNOSIS — I25.10 CORONARY ARTERY DISEASE INVOLVING NATIVE CORONARY ARTERY OF NATIVE HEART WITHOUT ANGINA PECTORIS: ICD-10-CM

## 2022-03-16 PROCEDURE — 93000 ELECTROCARDIOGRAM COMPLETE: CPT | Performed by: PHYSICIAN ASSISTANT

## 2022-03-16 PROCEDURE — 99213 OFFICE O/P EST LOW 20 MIN: CPT | Performed by: PHYSICIAN ASSISTANT

## 2022-03-16 RX ORDER — METOPROLOL TARTRATE 50 MG/1
25 TABLET, FILM COATED ORAL EVERY 12 HOURS SCHEDULED
Qty: 180 TABLET | Refills: 3 | Status: SHIPPED | OUTPATIENT
Start: 2022-03-16

## 2022-03-16 RX ORDER — VALSARTAN 80 MG/1
80 TABLET ORAL DAILY
Qty: 60 TABLET | Refills: 6 | Status: SHIPPED | OUTPATIENT
Start: 2022-03-16 | End: 2023-02-16

## 2022-03-16 NOTE — PROGRESS NOTES
[facility] at [facility]  INITIAL OFFICE CONSULT      Dianne Barry  1945  PCP: Daron Dotson MD    SUBJECTIVE:   Dianne Barry is a 76 y.o. female seen for a consultation visit regarding the following:     Chief Complaint:   Chief Complaint   Patient presents with   • surgery clearance          Consultation is requested by No ref. provider found for evaluation of surgery clearance        History:  Pleasant 76 year old female presents for consult for pre op evaluation for knee surgery. She has followed in past by Dr. Velazco for CAD, HTN, Chronic diastolic dysfunction. She states since last seen by our office she has had no Chest pain suggesting angina. She is quit limited by her knee pain and left arm rotator cuff injury. She tries to stay active but does get fatigued easily and she feels this is due to deconditioning form being inactive from her knee pain. She denies any orthopnea, PND, or edema. She denies any palpitations, dizziness, near syncope. She states her BP is well controlled and she is tolerating her Statin medication. She tries to watch her diet for DM.       Cardiac PMH: (Old records have been reviewed and summarized below)  1. CAD  a. LHC 2017 mild diffuse CAD, No flow limiting stenoses, Normal EF-Medical therapy  b. TTE 9/2017 normal EF Mild LVH with mild septal asymmetric hypertrophy, diastolic dysfunction, Mild MR  2. Chronic Diastolic dysfunction  3. HTN: controlled on BB  4. Iatrogenic Bradycardia  5. HLD: Crestor      Past Medical History, Past Surgical History, Family history, Social History, and Medications were all reviewed with the patient today and updated as necessary.     Current Outpatient Medications   Medication Sig Dispense Refill   • aspirin 81 MG EC tablet Take 81 mg by mouth Every Morning.     • BIOTIN PO Take 1 capsule by mouth Daily.     • CINNAMON PO Take 2 capsules by mouth Daily.     • estradiol (ESTRACE) 1 MG tablet Take 1 tablet by mouth Daily.      • glimepiride (AMARYL) 1 MG tablet Take 1 mg by mouth Daily.     • meclizine 25 MG chewable tablet chewable tablet Chew 25 mg As Needed.     • meloxicam (MOBIC) 7.5 MG tablet Take 7.5 mg by mouth As Needed.     • metoprolol tartrate (LOPRESSOR) 50 MG tablet Take 1 tablet by mouth Every 12 (Twelve) Hours. 180 tablet 3   • mupirocin (BACTROBAN) 2 % ointment 1 application into the nostril(s) as directed by provider Daily.     • Omega-3 Fatty Acids (FISH OIL) 1000 MG capsule capsule Take 2,000 mg by mouth Daily With Breakfast.     • rosuvastatin (CRESTOR) 5 MG tablet Every Other Day.     • Testosterone Propionate (FIRST-TESTOSTERONE) 2 % ointment Place 1 application on the skin as directed by provider Every Morning. PLACES ON INNER THIGH       No current facility-administered medications for this visit.     Allergies   Allergen Reactions   • Darvon [Propoxyphene] Hallucinations     And DARVOCET   • Atorvastatin Myalgia         Past Medical History:   Diagnosis Date   • Arthritis    • Bacterial UTI 2017    HOSPITALIZED FOR SEPSIS   • Cancer (HCC)    • Cataracts, bilateral    • Chronic diastolic congestive heart failure (HCC) 2018   • Coronary artery disease involving native coronary artery of native heart without angina pectoris 2018   • Diabetes mellitus (HCC)    • Gall stones    • Hip fracture, right (HCC)    • History of transfusion    • Hypertension      Past Surgical History:   Procedure Laterality Date   • ABDOMINAL SURGERY     • CARDIAC CATHETERIZATION N/A 10/17/2017    Procedure: Left Heart Cath;  Surgeon: Oral Velazco MD;  Location: [facility] INVASIVE LOCATION;  Service:    • CATARACT EXTRACTION     •  SECTION     • COLON SURGERY     • COLONOSCOPY     • CYSTOSCOPY     • EYE SURGERY     • FRACTURE SURGERY     • HYSTERECTOMY     • MULTIPLE TOOTH EXTRACTIONS     • OOPHORECTOMY     • TUBAL ABDOMINAL LIGATION       Family History   Problem Relation Age of Onset   • Heart disease Mother    •  "Hyperlipidemia Mother    • Hypertension Mother    • Diabetes Mother    • Heart attack Mother    • Heart attack Father    • Depression Other    • Breast cancer Neg Hx    • Ovarian cancer Neg Hx      Social History     Tobacco Use   • Smoking status: Never Smoker   • Smokeless tobacco: Never Used   Substance Use Topics   • Alcohol use: Yes     Comment: 4 times per month, 1 drink       ROS:  Review of Symptoms:  General: = fatigue  Skin: no rashes, lumps, or other skin changes  HEENT: no dizziness, lightheadedness, or vision changes  Respiratory: no cough or hemoptysis  Cardiovascular: no palpitations, and tachycardia  Gastrointestinal: no black/tarry stools or diarrhea  Urinary: no change in frequency or urgency  Peripheral Vascular: no claudication or leg cramps  Musculoskeletal: + OA knee pain  Psychiatric: no depression or excessive stress  Neurological: no sensory or motor loss, no syncope  Hematologic: no anemia, easy bruising or bleeding  Endocrine: no thyroid problems, nor heat or cold intolerance         PHYSICAL EXAM:   /78 (BP Location: Right arm, Patient Position: Sitting)   Pulse 55   Ht 166.4 cm (65.5\")   Wt 77.1 kg (170 lb)   SpO2 95%   BMI 27.86 kg/m²      Wt Readings from Last 5 Encounters:   03/16/22 77.1 kg (170 lb)   08/11/21 78.9 kg (174 lb)   04/30/18 73.9 kg (163 lb)   01/10/18 74.6 kg (164 lb 7.4 oz)   10/17/17 75.7 kg (166 lb 14.2 oz)     BP Readings from Last 5 Encounters:   03/16/22 160/78   08/11/21 124/82   04/30/18 128/70   01/10/18 175/94   10/17/17 109/61       General-Well Nourished, Well developed  Eyes - PERRLA  Neck- supple, No mass  CV- regular rate and rhythm, no MRG  Lung- clear bilaterally  Abd- soft, +BS  Musc/skel - Norm strength and range of motion  Skin- warm and dry  Neuro - Alert & Oriented x 3, appropriate mood.    Patient's external notes were reviewed.  Independent interpretation of test performed by another physician in facility were reviewed.  Outside " laboratory data was also reviewed.    Medical problems and test results were reviewed with the patient today.         EKG:  (EKG/Tracing has been independently visualized by me and summarized below)      ECG 12 Lead    Date/Time: 3/16/2022 11:12 AM  Performed by: Artem Barber PA  Authorized by: Artem Barber PA   Rhythm: sinus bradycardia  Rate: bradycardic  Conduction: conduction normal  ST Segments: ST segments normal  QRS axis: normal  Other: no other findings              ASSESSMENT   1. Pre op evaluation  2. CAD: Mild diffuse plaque, No significant stenosis, Normal EF by left heart catheterization 2017  3. Chronic Diastolic dysfunction  4. HLD: Crestor, Followed by Dr. Fernandez  5. Iatrogenic asymptomatic bradycardia, Lopressor   6. Glucose intolerance, DM Type II-HbA1c 6.5      PLAN  · Decrease lopressor to 25mg BID, will Add diovan 80mg daily  · Echocardiogram.   · Check blood pressure on regular basis  · If echocardiogram is stable she feels symptom wise improved lower dose of beta-blocker should be considered low cardiovascular risk to pursue knee surgery.  · Return for follow up martín Velazco in 8 months or sooner as needed.        Cardiology/Electrophysiology  03/16/22  11:09 EDT  Will Sunil CAMEJO

## 2022-03-16 NOTE — TELEPHONE ENCOUNTER
----- Message from SAMIRA Knowles sent at 3/16/2022 11:16 AM EDT -----  Please get recent labs from Dr. Hughes (January)

## 2022-03-21 ENCOUNTER — HOSPITAL ENCOUNTER (OUTPATIENT)
Dept: CARDIOLOGY | Facility: HOSPITAL | Age: 77
Discharge: HOME | End: 2022-03-21
Admitting: PHYSICIAN ASSISTANT

## 2022-03-21 DIAGNOSIS — R06.02 SOB (SHORTNESS OF BREATH): ICD-10-CM

## 2022-03-21 PROCEDURE — 93306 TTE W/DOPPLER COMPLETE: CPT | Performed by: INTERNAL MEDICINE

## 2022-03-21 PROCEDURE — 93306 TTE W/DOPPLER COMPLETE: CPT

## 2022-03-22 LAB
ASCENDING AORTA: 3.8 CM
BH CV ECHO MEAS - AI DEC SLOPE: 166.2 CM/SEC^2
BH CV ECHO MEAS - AI MAX PG: 40 MMHG
BH CV ECHO MEAS - AI MAX VEL: 316.3 CM/SEC
BH CV ECHO MEAS - AI P1/2T: 557.3 MSEC
BH CV ECHO MEAS - AO MAX PG: 9 MMHG
BH CV ECHO MEAS - AO MEAN PG: 4.9 MMHG
BH CV ECHO MEAS - AO ROOT AREA (BSA CORRECTED): 1.6
BH CV ECHO MEAS - AO ROOT AREA: 6.9 CM^2
BH CV ECHO MEAS - AO ROOT DIAM: 3 CM
BH CV ECHO MEAS - AO V2 MAX: 150.3 CM/SEC
BH CV ECHO MEAS - AO V2 MEAN: 104.3 CM/SEC
BH CV ECHO MEAS - AO V2 VTI: 40 CM
BH CV ECHO MEAS - ASC AORTA: 3.8 CM
BH CV ECHO MEAS - BSA(HAYCOCK): 1.9 M^2
BH CV ECHO MEAS - BSA: 1.8 M^2
BH CV ECHO MEAS - BZI_BMI: 28.3 KILOGRAMS/M^2
BH CV ECHO MEAS - BZI_METRIC_HEIGHT: 165.1 CM
BH CV ECHO MEAS - BZI_METRIC_WEIGHT: 77.1 KG
BH CV ECHO MEAS - EDV(CUBED): 85.1 ML
BH CV ECHO MEAS - EDV(MOD-SP2): 83 ML
BH CV ECHO MEAS - EDV(MOD-SP4): 94 ML
BH CV ECHO MEAS - EDV(TEICH): 87.6 ML
BH CV ECHO MEAS - EF(CUBED): 54.1 %
BH CV ECHO MEAS - EF(MOD-BP): 62 %
BH CV ECHO MEAS - EF(MOD-SP2): 67.5 %
BH CV ECHO MEAS - EF(MOD-SP4): 57.4 %
BH CV ECHO MEAS - EF(TEICH): 46.1 %
BH CV ECHO MEAS - ESV(CUBED): 39.1 ML
BH CV ECHO MEAS - ESV(MOD-SP2): 27 ML
BH CV ECHO MEAS - ESV(MOD-SP4): 40 ML
BH CV ECHO MEAS - ESV(TEICH): 47.2 ML
BH CV ECHO MEAS - FS: 22.9 %
BH CV ECHO MEAS - IVS/LVPW: 0.89
BH CV ECHO MEAS - IVSD: 0.93 CM
BH CV ECHO MEAS - LA DIMENSION: 3.9 CM
BH CV ECHO MEAS - LA/AO: 1.3
BH CV ECHO MEAS - LAD MAJOR: 5.9 CM
BH CV ECHO MEAS - LAT PEAK E' VEL: 6.1 CM/SEC
BH CV ECHO MEAS - LATERAL E/E' RATIO: 15.6
BH CV ECHO MEAS - LV DIASTOLIC VOL/BSA (35-75): 50.9 ML/M^2
BH CV ECHO MEAS - LV IVRT: 0.08 SEC
BH CV ECHO MEAS - LV MASS(C)D: 145.2 GRAMS
BH CV ECHO MEAS - LV MASS(C)DI: 78.7 GRAMS/M^2
BH CV ECHO MEAS - LV SYSTOLIC VOL/BSA (12-30): 21.7 ML/M^2
BH CV ECHO MEAS - LVIDD: 4.4 CM
BH CV ECHO MEAS - LVIDS: 3.4 CM
BH CV ECHO MEAS - LVLD AP2: 7.4 CM
BH CV ECHO MEAS - LVLD AP4: 7.5 CM
BH CV ECHO MEAS - LVLS AP2: 6.2 CM
BH CV ECHO MEAS - LVLS AP4: 6.6 CM
BH CV ECHO MEAS - LVOT AREA (M): 3.1 CM^2
BH CV ECHO MEAS - LVOT AREA: 3.1 CM^2
BH CV ECHO MEAS - LVOT DIAM: 2 CM
BH CV ECHO MEAS - LVPWD: 1 CM
BH CV ECHO MEAS - MED PEAK E' VEL: 5.5 CM/SEC
BH CV ECHO MEAS - MEDIAL E/E' RATIO: 17.5
BH CV ECHO MEAS - MV A MAX VEL: 82.4 CM/SEC
BH CV ECHO MEAS - MV DEC SLOPE: 192 CM/SEC^2
BH CV ECHO MEAS - MV DEC TIME: 0.28 SEC
BH CV ECHO MEAS - MV E MAX VEL: 97.7 CM/SEC
BH CV ECHO MEAS - MV E/A: 1.2
BH CV ECHO MEAS - MV MAX PG: 4.3 MMHG
BH CV ECHO MEAS - MV MEAN PG: 1.7 MMHG
BH CV ECHO MEAS - MV P1/2T MAX VEL: 92.9 CM/SEC
BH CV ECHO MEAS - MV P1/2T: 141.8 MSEC
BH CV ECHO MEAS - MV V2 MAX: 103.2 CM/SEC
BH CV ECHO MEAS - MV V2 MEAN: 61.2 CM/SEC
BH CV ECHO MEAS - MV V2 VTI: 39 CM
BH CV ECHO MEAS - MVA P1/2T LCG: 2.4 CM^2
BH CV ECHO MEAS - MVA(P1/2T): 1.6 CM^2
BH CV ECHO MEAS - PA ACC SLOPE: 208.8 CM/SEC^2
BH CV ECHO MEAS - PA ACC TIME: 0.23 SEC
BH CV ECHO MEAS - PA MAX PG: 2.5 MMHG
BH CV ECHO MEAS - PA PR(ACCEL): -22.4 MMHG
BH CV ECHO MEAS - PA V2 MAX: 78.5 CM/SEC
BH CV ECHO MEAS - RAP SYSTOLE: 3 MMHG
BH CV ECHO MEAS - RVSP: 26 MMHG
BH CV ECHO MEAS - SI(AO): 148.6 ML/M^2
BH CV ECHO MEAS - SI(CUBED): 24.9 ML/M^2
BH CV ECHO MEAS - SI(MOD-SP2): 30.3 ML/M^2
BH CV ECHO MEAS - SI(MOD-SP4): 29.3 ML/M^2
BH CV ECHO MEAS - SI(TEICH): 21.9 ML/M^2
BH CV ECHO MEAS - SV(AO): 274.3 ML
BH CV ECHO MEAS - SV(CUBED): 46 ML
BH CV ECHO MEAS - SV(MOD-SP2): 56 ML
BH CV ECHO MEAS - SV(MOD-SP4): 54 ML
BH CV ECHO MEAS - SV(TEICH): 40.4 ML
BH CV ECHO MEAS - TAPSE (>1.6): 2.2 CM
BH CV ECHO MEAS - TR MAX PG: 23 MMHG
BH CV ECHO MEAS - TR MAX VEL: 239 CM/SEC
BH CV ECHO MEASUREMENTS AVERAGE E/E' RATIO: 16.84
BH CV VAS BP LEFT ARM: NORMAL MMHG
BH CV XLRA - RV BASE: 3.7 CM
BH CV XLRA - RV LENGTH: 4.7 CM
BH CV XLRA - RV MID: 2.8 CM
BH CV XLRA - TDI S': 8.27 CM/SEC
LEFT ATRIUM VOLUME INDEX: 30.9 ML/M^2
LEFT ATRIUM VOLUME: 57 ML
MAXIMAL PREDICTED HEART RATE: 144 BPM
STRESS TARGET HR: 122 BPM

## 2022-03-28 ENCOUNTER — APPOINTMENT (OUTPATIENT)
Dept: CARDIOLOGY | Facility: HOSPITAL | Age: 77
End: 2022-03-28

## 2022-05-17 ENCOUNTER — TRANSCRIBE ORDERS (OUTPATIENT)
Dept: ADMINISTRATIVE | Facility: HOSPITAL | Age: 77
End: 2022-05-17

## 2022-05-17 DIAGNOSIS — Z12.31 VISIT FOR SCREENING MAMMOGRAM: Primary | ICD-10-CM

## 2022-06-16 ENCOUNTER — APPOINTMENT (OUTPATIENT)
Dept: MAMMOGRAPHY | Facility: HOSPITAL | Age: 77
End: 2022-06-16

## 2022-07-19 ENCOUNTER — HOSPITAL ENCOUNTER (OUTPATIENT)
Dept: MAMMOGRAPHY | Facility: HOSPITAL | Age: 77
Discharge: HOME | End: 2022-07-19
Admitting: NURSE PRACTITIONER

## 2022-07-19 DIAGNOSIS — Z12.31 VISIT FOR SCREENING MAMMOGRAM: ICD-10-CM

## 2022-07-19 PROCEDURE — 77067 SCR MAMMO BI INCL CAD: CPT

## 2022-07-19 PROCEDURE — 77063 BREAST TOMOSYNTHESIS BI: CPT

## 2022-07-19 PROCEDURE — 77067 SCR MAMMO BI INCL CAD: CPT | Performed by: RADIOLOGY

## 2022-07-19 PROCEDURE — 77063 BREAST TOMOSYNTHESIS BI: CPT | Performed by: RADIOLOGY

## 2023-02-08 ENCOUNTER — TRANSCRIBE ORDERS (OUTPATIENT)
Dept: ADMINISTRATIVE | Facility: HOSPITAL | Age: 78
End: 2023-02-08
Payer: MEDICARE

## 2023-02-08 DIAGNOSIS — G45.9 TRANSIENT CEREBRAL ISCHEMIA, UNSPECIFIED TYPE: Primary | ICD-10-CM

## 2023-02-09 ENCOUNTER — TRANSCRIBE ORDERS (OUTPATIENT)
Dept: ADMINISTRATIVE | Facility: HOSPITAL | Age: 78
End: 2023-02-09
Payer: MEDICARE

## 2023-02-09 DIAGNOSIS — G45.9 TRANSIENT ISCHEMIC ATTACK: ICD-10-CM

## 2023-02-16 RX ORDER — VALSARTAN 80 MG/1
TABLET ORAL
Qty: 30 TABLET | Refills: 0 | Status: SHIPPED | OUTPATIENT
Start: 2023-02-16

## 2023-02-21 ENCOUNTER — HOSPITAL ENCOUNTER (OUTPATIENT)
Dept: CT IMAGING | Facility: HOSPITAL | Age: 78
Discharge: HOME | End: 2023-02-21
Admitting: FAMILY MEDICINE
Payer: MEDICARE

## 2023-02-21 DIAGNOSIS — G45.9 TRANSIENT ISCHEMIC ATTACK: ICD-10-CM

## 2023-02-21 LAB — CREAT BLDA-MCNC: 0.8 MG/DL (ref 0.6–1.3)

## 2023-02-21 PROCEDURE — 70496 CT ANGIOGRAPHY HEAD: CPT

## 2023-02-21 PROCEDURE — 70498 CT ANGIOGRAPHY NECK: CPT

## 2023-02-21 PROCEDURE — 82565 ASSAY OF CREATININE: CPT

## 2023-02-21 PROCEDURE — 0 IOPAMIDOL PER 1 ML: Performed by: FAMILY MEDICINE

## 2023-02-21 RX ADMIN — IOPAMIDOL 100 ML: 755 INJECTION, SOLUTION INTRAVENOUS at 11:45

## 2023-03-01 ENCOUNTER — TRANSCRIBE ORDERS (OUTPATIENT)
Dept: ADMINISTRATIVE | Facility: HOSPITAL | Age: 78
End: 2023-03-01
Payer: MEDICARE

## 2023-03-01 DIAGNOSIS — I25.10 ATHEROSCLEROSIS OF NATIVE CORONARY ARTERY WITHOUT ANGINA PECTORIS, UNSPECIFIED WHETHER NATIVE OR TRANSPLANTED HEART: Primary | ICD-10-CM

## 2023-03-01 DIAGNOSIS — Z86.73 PERSONAL HISTORY OF TRANSIENT ISCHEMIC ATTACK (TIA), AND CEREBRAL INFARCTION WITHOUT RESIDUAL DEFICITS: ICD-10-CM

## 2023-03-01 DIAGNOSIS — I25.10 ATHEROSCLEROSIS OF NATIVE CORONARY ARTERY WITHOUT ANGINA PECTORIS, UNSPECIFIED WHETHER NATIVE OR TRANSPLANTED HEART: ICD-10-CM

## 2023-03-02 ENCOUNTER — HOSPITAL ENCOUNTER (OUTPATIENT)
Dept: CARDIOLOGY | Facility: HOSPITAL | Age: 78
Discharge: HOME | End: 2023-03-02
Payer: MEDICARE

## 2023-03-02 VITALS — BODY MASS INDEX: 27.32 KG/M2 | WEIGHT: 169.97 LBS | HEIGHT: 66 IN

## 2023-03-02 VITALS — BODY MASS INDEX: 27.32 KG/M2 | HEIGHT: 66 IN | WEIGHT: 170 LBS

## 2023-03-02 DIAGNOSIS — Z86.73 PERSONAL HISTORY OF TRANSIENT ISCHEMIC ATTACK (TIA), AND CEREBRAL INFARCTION WITHOUT RESIDUAL DEFICITS: ICD-10-CM

## 2023-03-02 DIAGNOSIS — G45.9 TRANSIENT CEREBRAL ISCHEMIA, UNSPECIFIED TYPE: ICD-10-CM

## 2023-03-02 LAB
ASCENDING AORTA: 4.2 CM
BH CV ECHO MEAS - AI P1/2T: 610.6 MSEC
BH CV ECHO MEAS - AO MAX PG: 6.5 MMHG
BH CV ECHO MEAS - AO MEAN PG: 3 MMHG
BH CV ECHO MEAS - AO ROOT DIAM: 3.1 CM
BH CV ECHO MEAS - AO V2 MAX: 127 CM/SEC
BH CV ECHO MEAS - AO V2 VTI: 34 CM
BH CV ECHO MEAS - AVA(I,D): 2.03 CM2
BH CV ECHO MEAS - EDV(CUBED): 97.3 ML
BH CV ECHO MEAS - EDV(MOD-SP2): 81.2 ML
BH CV ECHO MEAS - EDV(MOD-SP4): 72.7 ML
BH CV ECHO MEAS - EF(MOD-BP): 60.1 %
BH CV ECHO MEAS - EF(MOD-SP2): 60 %
BH CV ECHO MEAS - EF(MOD-SP4): 61.1 %
BH CV ECHO MEAS - ESV(CUBED): 19.7 ML
BH CV ECHO MEAS - ESV(MOD-SP2): 32.5 ML
BH CV ECHO MEAS - ESV(MOD-SP4): 28.3 ML
BH CV ECHO MEAS - FS: 41.3 %
BH CV ECHO MEAS - IVS/LVPW: 0.9 CM
BH CV ECHO MEAS - IVSD: 0.9 CM
BH CV ECHO MEAS - LA DIMENSION: 3 CM
BH CV ECHO MEAS - LAT PEAK E' VEL: 6.5 CM/SEC
BH CV ECHO MEAS - LV DIASTOLIC VOL/BSA (35-75): 38.9 CM2
BH CV ECHO MEAS - LV MASS(C)D: 148.1 GRAMS
BH CV ECHO MEAS - LV MAX PG: 3.2 MMHG
BH CV ECHO MEAS - LV MEAN PG: 2 MMHG
BH CV ECHO MEAS - LV SYSTOLIC VOL/BSA (12-30): 15.2 CM2
BH CV ECHO MEAS - LV V1 MAX: 89.1 CM/SEC
BH CV ECHO MEAS - LV V1 VTI: 24.3 CM
BH CV ECHO MEAS - LVIDD: 4.6 CM
BH CV ECHO MEAS - LVIDS: 2.7 CM
BH CV ECHO MEAS - LVOT AREA: 2.8 CM2
BH CV ECHO MEAS - LVOT DIAM: 1.9 CM
BH CV ECHO MEAS - LVPWD: 1 CM
BH CV ECHO MEAS - MED PEAK E' VEL: 6.9 CM/SEC
BH CV ECHO MEAS - MV A MAX VEL: 89.1 CM/SEC
BH CV ECHO MEAS - MV DEC SLOPE: 367 CM/SEC2
BH CV ECHO MEAS - MV DEC TIME: 0.26 MSEC
BH CV ECHO MEAS - MV E MAX VEL: 78.1 CM/SEC
BH CV ECHO MEAS - MV E/A: 0.88
BH CV ECHO MEAS - MV MAX PG: 3.1 MMHG
BH CV ECHO MEAS - MV MEAN PG: 1 MMHG
BH CV ECHO MEAS - MV P1/2T: 75.3 MSEC
BH CV ECHO MEAS - MV V2 VTI: 37.8 CM
BH CV ECHO MEAS - MVA(P1/2T): 2.9 CM2
BH CV ECHO MEAS - MVA(VTI): 1.82 CM2
BH CV ECHO MEAS - PA ACC TIME: 0.11 SEC
BH CV ECHO MEAS - PA PR(ACCEL): 30 MMHG
BH CV ECHO MEAS - PI END-D VEL: 94.6 CM/SEC
BH CV ECHO MEAS - RAP SYSTOLE: 3 MMHG
BH CV ECHO MEAS - RVSP: 19 MMHG
BH CV ECHO MEAS - SI(MOD-SP2): 26.1 ML/M2
BH CV ECHO MEAS - SI(MOD-SP4): 23.8 ML/M2
BH CV ECHO MEAS - SV(LVOT): 68.9 ML
BH CV ECHO MEAS - SV(MOD-SP2): 48.7 ML
BH CV ECHO MEAS - SV(MOD-SP4): 44.4 ML
BH CV ECHO MEAS - TAPSE (>1.6): 2.33 CM
BH CV ECHO MEAS - TR MAX PG: 16.4 MMHG
BH CV ECHO MEAS - TR MAX VEL: 202.5 CM/SEC
BH CV ECHO MEASUREMENTS AVERAGE E/E' RATIO: 11.66
BH CV VAS BP RIGHT ARM: NORMAL MMHG
BH CV XLRA - RV BASE: 3.3 CM
BH CV XLRA - RV LENGTH: 6.4 CM
BH CV XLRA - RV MID: 2.7 CM
BH CV XLRA - TDI S': 10.4 CM/SEC
BH CV XLRA MEAS LEFT DIST CCA EDV: 14.9 CM/SEC
BH CV XLRA MEAS LEFT DIST CCA PSV: 58.5 CM/SEC
BH CV XLRA MEAS LEFT DIST ICA EDV: -29.3 CM/SEC
BH CV XLRA MEAS LEFT DIST ICA PSV: -90.3 CM/SEC
BH CV XLRA MEAS LEFT ICA/CCA RATIO: 1.48
BH CV XLRA MEAS LEFT MID CCA EDV: 12.9 CM/SEC
BH CV XLRA MEAS LEFT MID CCA PSV: 61 CM/SEC
BH CV XLRA MEAS LEFT MID ICA EDV: -21.7 CM/SEC
BH CV XLRA MEAS LEFT MID ICA PSV: -85.6 CM/SEC
BH CV XLRA MEAS LEFT PROX CCA EDV: 15.8 CM/SEC
BH CV XLRA MEAS LEFT PROX CCA PSV: 76.2 CM/SEC
BH CV XLRA MEAS LEFT PROX ECA EDV: 6.7 CM/SEC
BH CV XLRA MEAS LEFT PROX ECA PSV: 43.3 CM/SEC
BH CV XLRA MEAS LEFT PROX ICA EDV: 7.9 CM/SEC
BH CV XLRA MEAS LEFT PROX ICA PSV: 33.8 CM/SEC
BH CV XLRA MEAS LEFT PROX SCLA PSV: 90.3 CM/SEC
BH CV XLRA MEAS LEFT VERTEBRAL A EDV: 10.2 CM/SEC
BH CV XLRA MEAS LEFT VERTEBRAL A PSV: 44.8 CM/SEC
BH CV XLRA MEAS RIGHT DIST CCA EDV: 11.8 CM/SEC
BH CV XLRA MEAS RIGHT DIST CCA PSV: 46.8 CM/SEC
BH CV XLRA MEAS RIGHT DIST ICA EDV: 17.3 CM/SEC
BH CV XLRA MEAS RIGHT DIST ICA PSV: 61.3 CM/SEC
BH CV XLRA MEAS RIGHT ICA/CCA RATIO: 1.39
BH CV XLRA MEAS RIGHT MID CCA EDV: 11 CM/SEC
BH CV XLRA MEAS RIGHT MID CCA PSV: 51.1 CM/SEC
BH CV XLRA MEAS RIGHT MID ICA EDV: 22 CM/SEC
BH CV XLRA MEAS RIGHT MID ICA PSV: 71.1 CM/SEC
BH CV XLRA MEAS RIGHT PROX CCA EDV: 10.6 CM/SEC
BH CV XLRA MEAS RIGHT PROX CCA PSV: 62.5 CM/SEC
BH CV XLRA MEAS RIGHT PROX ECA EDV: 5.9 CM/SEC
BH CV XLRA MEAS RIGHT PROX ECA PSV: 53.4 CM/SEC
BH CV XLRA MEAS RIGHT PROX ICA EDV: 9.4 CM/SEC
BH CV XLRA MEAS RIGHT PROX ICA PSV: 42 CM/SEC
BH CV XLRA MEAS RIGHT PROX SCLA PSV: 51.5 CM/SEC
BH CV XLRA MEAS RIGHT VERTEBRAL A EDV: 21.1 CM/SEC
BH CV XLRA MEAS RIGHT VERTEBRAL A PSV: 90.9 CM/SEC
LEFT ARM BP: NORMAL MMHG
LEFT ATRIUM VOLUME INDEX: 25.3 ML/M2
MAXIMAL PREDICTED HEART RATE: 143 BPM
MAXIMAL PREDICTED HEART RATE: 143 BPM
RIGHT ARM BP: NORMAL MMHG
STRESS TARGET HR: 122 BPM
STRESS TARGET HR: 122 BPM

## 2023-03-02 PROCEDURE — 93306 TTE W/DOPPLER COMPLETE: CPT

## 2023-03-02 PROCEDURE — 93880 EXTRACRANIAL BILAT STUDY: CPT | Performed by: INTERNAL MEDICINE

## 2023-03-02 PROCEDURE — 93880 EXTRACRANIAL BILAT STUDY: CPT

## 2023-03-02 PROCEDURE — 93306 TTE W/DOPPLER COMPLETE: CPT | Performed by: INTERNAL MEDICINE

## 2023-03-06 ENCOUNTER — HOSPITAL ENCOUNTER (OUTPATIENT)
Dept: MRI IMAGING | Facility: HOSPITAL | Age: 78
Discharge: HOME | End: 2023-03-06
Admitting: FAMILY MEDICINE
Payer: MEDICARE

## 2023-03-06 DIAGNOSIS — G45.9 TRANSIENT CEREBRAL ISCHEMIA, UNSPECIFIED TYPE: ICD-10-CM

## 2023-03-06 PROCEDURE — 70551 MRI BRAIN STEM W/O DYE: CPT

## 2023-03-15 ENCOUNTER — TRANSCRIBE ORDERS (OUTPATIENT)
Dept: ADMINISTRATIVE | Facility: HOSPITAL | Age: 78
End: 2023-03-15
Payer: MEDICARE

## 2023-03-15 DIAGNOSIS — Z13.820 SCREENING FOR OSTEOPOROSIS: ICD-10-CM

## 2023-03-15 DIAGNOSIS — Z78.0 MENOPAUSE: ICD-10-CM

## 2023-06-14 RX ORDER — VALSARTAN 80 MG/1
80 TABLET ORAL DAILY
Qty: 60 TABLET | Refills: 1 | Status: SHIPPED | OUTPATIENT
Start: 2023-06-14

## 2023-06-15 ENCOUNTER — TRANSCRIBE ORDERS (OUTPATIENT)
Dept: ADMINISTRATIVE | Facility: HOSPITAL | Age: 78
End: 2023-06-15
Payer: MEDICARE

## 2023-06-15 DIAGNOSIS — Z12.31 SCREENING MAMMOGRAM FOR BREAST CANCER: Primary | ICD-10-CM

## 2023-07-18 ENCOUNTER — APPOINTMENT (OUTPATIENT)
Dept: CT IMAGING | Facility: HOSPITAL | Age: 78
End: 2023-07-18
Payer: MEDICARE

## 2023-07-18 ENCOUNTER — HOSPITAL ENCOUNTER (OUTPATIENT)
Facility: HOSPITAL | Age: 78
Setting detail: OBSERVATION
Discharge: HOME | End: 2023-07-21
Attending: EMERGENCY MEDICINE | Admitting: HOSPITALIST
Payer: MEDICARE

## 2023-07-18 DIAGNOSIS — R51.9 NONINTRACTABLE HEADACHE, UNSPECIFIED CHRONICITY PATTERN, UNSPECIFIED HEADACHE TYPE: ICD-10-CM

## 2023-07-18 DIAGNOSIS — R91.1 NODULE OF LOWER LOBE OF RIGHT LUNG: ICD-10-CM

## 2023-07-18 DIAGNOSIS — R47.01 EXPRESSIVE APHASIA: Primary | ICD-10-CM

## 2023-07-18 DIAGNOSIS — R41.841 COGNITIVE COMMUNICATION DEFICIT: ICD-10-CM

## 2023-07-18 DIAGNOSIS — I10 POORLY-CONTROLLED HYPERTENSION: ICD-10-CM

## 2023-07-18 DIAGNOSIS — R47.1 DYSARTHRIA: ICD-10-CM

## 2023-07-18 LAB
BUN BLDA-MCNC: 18 MG/DL (ref 8–26)
CA-I BLDA-SCNC: 1.19 MMOL/L (ref 1.2–1.32)
CHLORIDE BLDA-SCNC: 101 MMOL/L (ref 98–109)
CO2 BLDA-SCNC: 24 MMOL/L (ref 24–29)
CREAT BLDA-MCNC: 0.9 MG/DL (ref 0.6–1.3)
EGFRCR SERPLBLD CKD-EPI 2021: 65.6 ML/MIN/1.73
GLUCOSE BLDC GLUCOMTR-MCNC: 209 MG/DL (ref 70–130)
HCT VFR BLDA CALC: 38 % (ref 38–51)
HGB BLDA-MCNC: 12.9 G/DL (ref 12–17)
POTASSIUM BLDA-SCNC: 3.5 MMOL/L (ref 3.5–4.9)
SODIUM BLD-SCNC: 138 MMOL/L (ref 138–146)

## 2023-07-18 PROCEDURE — 85014 HEMATOCRIT: CPT

## 2023-07-18 PROCEDURE — 85025 COMPLETE CBC W/AUTO DIFF WBC: CPT | Performed by: EMERGENCY MEDICINE

## 2023-07-18 PROCEDURE — 80047 BASIC METABLC PNL IONIZED CA: CPT

## 2023-07-18 PROCEDURE — 85610 PROTHROMBIN TIME: CPT

## 2023-07-18 PROCEDURE — 70496 CT ANGIOGRAPHY HEAD: CPT

## 2023-07-18 PROCEDURE — 93005 ELECTROCARDIOGRAM TRACING: CPT | Performed by: EMERGENCY MEDICINE

## 2023-07-18 PROCEDURE — 99285 EMERGENCY DEPT VISIT HI MDM: CPT

## 2023-07-18 PROCEDURE — 70450 CT HEAD/BRAIN W/O DYE: CPT

## 2023-07-18 PROCEDURE — 84460 ALANINE AMINO (ALT) (SGPT): CPT | Performed by: EMERGENCY MEDICINE

## 2023-07-18 PROCEDURE — 70498 CT ANGIOGRAPHY NECK: CPT

## 2023-07-18 PROCEDURE — 85730 THROMBOPLASTIN TIME PARTIAL: CPT | Performed by: EMERGENCY MEDICINE

## 2023-07-18 PROCEDURE — 84484 ASSAY OF TROPONIN QUANT: CPT | Performed by: EMERGENCY MEDICINE

## 2023-07-18 PROCEDURE — 84450 TRANSFERASE (AST) (SGOT): CPT | Performed by: EMERGENCY MEDICINE

## 2023-07-18 PROCEDURE — 0042T HC CT CEREBRAL PERFUSION W/WO CONTRAST: CPT

## 2023-07-18 RX ORDER — LABETALOL HYDROCHLORIDE 5 MG/ML
10 INJECTION, SOLUTION INTRAVENOUS ONCE
Status: COMPLETED | OUTPATIENT
Start: 2023-07-19 | End: 2023-07-19

## 2023-07-18 RX ORDER — SODIUM CHLORIDE 0.9 % (FLUSH) 0.9 %
10 SYRINGE (ML) INJECTION AS NEEDED
Status: DISCONTINUED | OUTPATIENT
Start: 2023-07-18 | End: 2023-07-19

## 2023-07-18 NOTE — Clinical Note
Level of Care: Telemetry [5]   Diagnosis: Dysarthria [784.51.ICD-9-CM]   Admitting Physician: ZEENAT QUINTERO III [[id number]]   Attending Physician: ZEENAT QUINTERO III [[id number]]   Bed Request Comments: tele obs

## 2023-07-19 ENCOUNTER — APPOINTMENT (OUTPATIENT)
Dept: GENERAL RADIOLOGY | Facility: HOSPITAL | Age: 78
End: 2023-07-19
Payer: MEDICARE

## 2023-07-19 ENCOUNTER — APPOINTMENT (OUTPATIENT)
Dept: MRI IMAGING | Facility: HOSPITAL | Age: 78
End: 2023-07-19
Payer: MEDICARE

## 2023-07-19 PROBLEM — R47.1 DYSARTHRIA: Status: ACTIVE | Noted: 2023-07-19

## 2023-07-19 LAB
ALBUMIN SERPL-MCNC: 4.3 G/DL (ref 3.5–5.2)
ALBUMIN/GLOB SERPL: 1.3 G/DL
ALP SERPL-CCNC: 89 U/L (ref 39–117)
ALT SERPL W P-5'-P-CCNC: 8 U/L (ref 1–33)
ALT SERPL W P-5'-P-CCNC: 8 U/L (ref 1–33)
ANION GAP SERPL CALCULATED.3IONS-SCNC: 16 MMOL/L (ref 5–15)
APTT PPP: 30.5 SECONDS (ref 22–39)
AST SERPL-CCNC: 15 U/L (ref 1–32)
AST SERPL-CCNC: 16 U/L (ref 1–32)
BASOPHILS # BLD AUTO: 0.02 10*3/MM3 (ref 0–0.2)
BASOPHILS # BLD AUTO: 0.03 10*3/MM3 (ref 0–0.2)
BASOPHILS NFR BLD AUTO: 0.2 % (ref 0–1.5)
BASOPHILS NFR BLD AUTO: 0.6 % (ref 0–1.5)
BILIRUB SERPL-MCNC: 0.3 MG/DL (ref 0–1.2)
BUN SERPL-MCNC: 14 MG/DL (ref 8–23)
BUN/CREAT SERPL: 19.4 (ref 7–25)
CALCIUM SPEC-SCNC: 9.4 MG/DL (ref 8.6–10.5)
CHLORIDE SERPL-SCNC: 94 MMOL/L (ref 98–107)
CHOLEST SERPL-MCNC: 169 MG/DL (ref 0–200)
CO2 SERPL-SCNC: 23 MMOL/L (ref 22–29)
CREAT SERPL-MCNC: 0.72 MG/DL (ref 0.57–1)
DEPRECATED RDW RBC AUTO: 37.3 FL (ref 37–54)
DEPRECATED RDW RBC AUTO: 38.5 FL (ref 37–54)
EGFRCR SERPLBLD CKD-EPI 2021: 85.7 ML/MIN/1.73
EOSINOPHIL # BLD AUTO: 0 10*3/MM3 (ref 0–0.4)
EOSINOPHIL # BLD AUTO: 0.08 10*3/MM3 (ref 0–0.4)
EOSINOPHIL NFR BLD AUTO: 0 % (ref 0.3–6.2)
EOSINOPHIL NFR BLD AUTO: 1.6 % (ref 0.3–6.2)
ERYTHROCYTE [DISTWIDTH] IN BLOOD BY AUTOMATED COUNT: 12.3 % (ref 12.3–15.4)
ERYTHROCYTE [DISTWIDTH] IN BLOOD BY AUTOMATED COUNT: 12.3 % (ref 12.3–15.4)
GLOBULIN UR ELPH-MCNC: 3.3 GM/DL
GLUCOSE BLDC GLUCOMTR-MCNC: 202 MG/DL (ref 70–130)
GLUCOSE BLDC GLUCOMTR-MCNC: 247 MG/DL (ref 70–130)
GLUCOSE BLDC GLUCOMTR-MCNC: 269 MG/DL (ref 70–130)
GLUCOSE SERPL-MCNC: 274 MG/DL (ref 65–99)
HBA1C MFR BLD: 7.6 % (ref 4.8–5.6)
HCT VFR BLD AUTO: 38 % (ref 34–46.6)
HCT VFR BLD AUTO: 38.7 % (ref 34–46.6)
HDLC SERPL-MCNC: 68 MG/DL (ref 40–60)
HGB BLD-MCNC: 12.1 G/DL (ref 12–15.9)
HGB BLD-MCNC: 12.7 G/DL (ref 12–15.9)
HOLD SPECIMEN: NORMAL
IMM GRANULOCYTES # BLD AUTO: 0.04 10*3/MM3 (ref 0–0.05)
IMM GRANULOCYTES # BLD AUTO: 0.07 10*3/MM3 (ref 0–0.05)
IMM GRANULOCYTES NFR BLD AUTO: 0.5 % (ref 0–0.5)
IMM GRANULOCYTES NFR BLD AUTO: 1.4 % (ref 0–0.5)
INR PPP: 1 (ref 0.8–1.2)
LDLC SERPL CALC-MCNC: 78 MG/DL (ref 0–100)
LDLC/HDLC SERPL: 1.1 {RATIO}
LYMPHOCYTES # BLD AUTO: 0.48 10*3/MM3 (ref 0.7–3.1)
LYMPHOCYTES # BLD AUTO: 0.77 10*3/MM3 (ref 0.7–3.1)
LYMPHOCYTES NFR BLD AUTO: 15.7 % (ref 19.6–45.3)
LYMPHOCYTES NFR BLD AUTO: 5.9 % (ref 19.6–45.3)
MAGNESIUM SERPL-MCNC: 1.7 MG/DL (ref 1.6–2.4)
MCH RBC QN AUTO: 26.9 PG (ref 26.6–33)
MCH RBC QN AUTO: 27.8 PG (ref 26.6–33)
MCHC RBC AUTO-ENTMCNC: 31.3 G/DL (ref 31.5–35.7)
MCHC RBC AUTO-ENTMCNC: 33.4 G/DL (ref 31.5–35.7)
MCV RBC AUTO: 83.2 FL (ref 79–97)
MCV RBC AUTO: 86 FL (ref 79–97)
MONOCYTES # BLD AUTO: 0.17 10*3/MM3 (ref 0.1–0.9)
MONOCYTES # BLD AUTO: 0.47 10*3/MM3 (ref 0.1–0.9)
MONOCYTES NFR BLD AUTO: 2.1 % (ref 5–12)
MONOCYTES NFR BLD AUTO: 9.6 % (ref 5–12)
NEUTROPHILS NFR BLD AUTO: 3.49 10*3/MM3 (ref 1.7–7)
NEUTROPHILS NFR BLD AUTO: 7.48 10*3/MM3 (ref 1.7–7)
NEUTROPHILS NFR BLD AUTO: 71.1 % (ref 42.7–76)
NEUTROPHILS NFR BLD AUTO: 91.3 % (ref 42.7–76)
NRBC BLD AUTO-RTO: 0 /100 WBC (ref 0–0.2)
NRBC BLD AUTO-RTO: 0 /100 WBC (ref 0–0.2)
PLATELET # BLD AUTO: 173 10*3/MM3 (ref 140–450)
PLATELET # BLD AUTO: 193 10*3/MM3 (ref 140–450)
PMV BLD AUTO: 9.9 FL (ref 6–12)
PMV BLD AUTO: 9.9 FL (ref 6–12)
POTASSIUM SERPL-SCNC: 3.9 MMOL/L (ref 3.5–5.2)
PROT SERPL-MCNC: 7.6 G/DL (ref 6–8.5)
PROTHROMBIN TIME: 11.7 SECONDS (ref 12.8–15.2)
QT INTERVAL: 368 MS
QTC INTERVAL: 442 MS
RBC # BLD AUTO: 4.5 10*6/MM3 (ref 3.77–5.28)
RBC # BLD AUTO: 4.57 10*6/MM3 (ref 3.77–5.28)
SODIUM SERPL-SCNC: 133 MMOL/L (ref 136–145)
TRIGL SERPL-MCNC: 132 MG/DL (ref 0–150)
TROPONIN T SERPL HS-MCNC: 8 NG/L
VLDLC SERPL-MCNC: 23 MG/DL (ref 5–40)
WBC NRBC COR # BLD: 4.91 10*3/MM3 (ref 3.4–10.8)
WBC NRBC COR # BLD: 8.19 10*3/MM3 (ref 3.4–10.8)
WHOLE BLOOD HOLD COAG: NORMAL
WHOLE BLOOD HOLD SPECIMEN: NORMAL

## 2023-07-19 PROCEDURE — 63710000001 INSULIN REGULAR HUMAN PER 5 UNITS: Performed by: INTERNAL MEDICINE

## 2023-07-19 PROCEDURE — 82948 REAGENT STRIP/BLOOD GLUCOSE: CPT

## 2023-07-19 PROCEDURE — 83735 ASSAY OF MAGNESIUM: CPT | Performed by: INTERNAL MEDICINE

## 2023-07-19 PROCEDURE — 83036 HEMOGLOBIN GLYCOSYLATED A1C: CPT | Performed by: NURSE PRACTITIONER

## 2023-07-19 PROCEDURE — 99214 OFFICE O/P EST MOD 30 MIN: CPT | Performed by: NURSE PRACTITIONER

## 2023-07-19 PROCEDURE — 99214 OFFICE O/P EST MOD 30 MIN: CPT | Performed by: PSYCHIATRY & NEUROLOGY

## 2023-07-19 PROCEDURE — G0378 HOSPITAL OBSERVATION PER HR: HCPCS

## 2023-07-19 PROCEDURE — 85025 COMPLETE CBC W/AUTO DIFF WBC: CPT | Performed by: INTERNAL MEDICINE

## 2023-07-19 PROCEDURE — 96375 TX/PRO/DX INJ NEW DRUG ADDON: CPT

## 2023-07-19 PROCEDURE — 70551 MRI BRAIN STEM W/O DYE: CPT

## 2023-07-19 PROCEDURE — 71045 X-RAY EXAM CHEST 1 VIEW: CPT

## 2023-07-19 PROCEDURE — 99223 1ST HOSP IP/OBS HIGH 75: CPT | Performed by: INTERNAL MEDICINE

## 2023-07-19 PROCEDURE — 74018 RADEX ABDOMEN 1 VIEW: CPT

## 2023-07-19 PROCEDURE — 96365 THER/PROPH/DIAG IV INF INIT: CPT

## 2023-07-19 PROCEDURE — 25010000002 ONDANSETRON PER 1 MG

## 2023-07-19 PROCEDURE — 92523 SPEECH SOUND LANG COMPREHEN: CPT

## 2023-07-19 PROCEDURE — 80061 LIPID PANEL: CPT | Performed by: NURSE PRACTITIONER

## 2023-07-19 PROCEDURE — 96366 THER/PROPH/DIAG IV INF ADDON: CPT

## 2023-07-19 PROCEDURE — 25010000002 LORAZEPAM PER 2 MG: Performed by: EMERGENCY MEDICINE

## 2023-07-19 PROCEDURE — 80053 COMPREHEN METABOLIC PANEL: CPT | Performed by: INTERNAL MEDICINE

## 2023-07-19 PROCEDURE — 25510000001 IOPAMIDOL PER 1 ML: Performed by: EMERGENCY MEDICINE

## 2023-07-19 RX ORDER — LORAZEPAM 2 MG/ML
0.5 INJECTION INTRAMUSCULAR ONCE
Status: COMPLETED | OUTPATIENT
Start: 2023-07-19 | End: 2023-07-19

## 2023-07-19 RX ORDER — IBUPROFEN 600 MG/1
1 TABLET ORAL
Status: DISCONTINUED | OUTPATIENT
Start: 2023-07-19 | End: 2023-07-20

## 2023-07-19 RX ORDER — SODIUM CHLORIDE 9 MG/ML
75 INJECTION, SOLUTION INTRAVENOUS CONTINUOUS
Status: DISCONTINUED | OUTPATIENT
Start: 2023-07-19 | End: 2023-07-21 | Stop reason: HOSPADM

## 2023-07-19 RX ORDER — ONDANSETRON 2 MG/ML
INJECTION INTRAMUSCULAR; INTRAVENOUS
Status: COMPLETED
Start: 2023-07-19 | End: 2023-07-19

## 2023-07-19 RX ORDER — LABETALOL HYDROCHLORIDE 5 MG/ML
5 INJECTION, SOLUTION INTRAVENOUS ONCE
Status: COMPLETED | OUTPATIENT
Start: 2023-07-19 | End: 2023-07-19

## 2023-07-19 RX ORDER — SODIUM CHLORIDE 0.9 % (FLUSH) 0.9 %
10 SYRINGE (ML) INJECTION EVERY 12 HOURS SCHEDULED
Status: DISCONTINUED | OUTPATIENT
Start: 2023-07-19 | End: 2023-07-19

## 2023-07-19 RX ORDER — SODIUM CHLORIDE 9 MG/ML
40 INJECTION, SOLUTION INTRAVENOUS AS NEEDED
Status: DISCONTINUED | OUTPATIENT
Start: 2023-07-19 | End: 2023-07-19

## 2023-07-19 RX ORDER — SODIUM CHLORIDE 0.9 % (FLUSH) 0.9 %
10 SYRINGE (ML) INJECTION AS NEEDED
Status: DISCONTINUED | OUTPATIENT
Start: 2023-07-19 | End: 2023-07-19

## 2023-07-19 RX ORDER — NICOTINE POLACRILEX 4 MG
15 LOZENGE BUCCAL
Status: DISCONTINUED | OUTPATIENT
Start: 2023-07-19 | End: 2023-07-20

## 2023-07-19 RX ORDER — CLOPIDOGREL BISULFATE 75 MG/1
75 TABLET ORAL DAILY
Status: DISCONTINUED | OUTPATIENT
Start: 2023-07-20 | End: 2023-07-21 | Stop reason: HOSPADM

## 2023-07-19 RX ORDER — ATORVASTATIN CALCIUM 40 MG/1
80 TABLET, FILM COATED ORAL NIGHTLY
Status: DISCONTINUED | OUTPATIENT
Start: 2023-07-19 | End: 2023-07-20

## 2023-07-19 RX ORDER — SODIUM CHLORIDE 9 MG/ML
40 INJECTION, SOLUTION INTRAVENOUS AS NEEDED
Status: DISCONTINUED | OUTPATIENT
Start: 2023-07-19 | End: 2023-07-21 | Stop reason: HOSPADM

## 2023-07-19 RX ORDER — SODIUM CHLORIDE 0.9 % (FLUSH) 0.9 %
10 SYRINGE (ML) INJECTION AS NEEDED
Status: DISCONTINUED | OUTPATIENT
Start: 2023-07-19 | End: 2023-07-21 | Stop reason: HOSPADM

## 2023-07-19 RX ORDER — ONDANSETRON 2 MG/ML
4 INJECTION INTRAMUSCULAR; INTRAVENOUS ONCE
Status: COMPLETED | OUTPATIENT
Start: 2023-07-19 | End: 2023-07-19

## 2023-07-19 RX ORDER — PANTOPRAZOLE SODIUM 20 MG/1
20 TABLET, DELAYED RELEASE ORAL 2 TIMES DAILY
COMMUNITY

## 2023-07-19 RX ORDER — NITROGLYCERIN 0.4 MG/1
0.4 TABLET SUBLINGUAL
Status: DISCONTINUED | OUTPATIENT
Start: 2023-07-19 | End: 2023-07-21 | Stop reason: HOSPADM

## 2023-07-19 RX ORDER — DEXTROSE MONOHYDRATE 25 G/50ML
25 INJECTION, SOLUTION INTRAVENOUS
Status: DISCONTINUED | OUTPATIENT
Start: 2023-07-19 | End: 2023-07-20

## 2023-07-19 RX ORDER — CLOPIDOGREL BISULFATE 75 MG/1
75 TABLET ORAL DAILY
COMMUNITY

## 2023-07-19 RX ORDER — ASPIRIN 325 MG
325 TABLET ORAL DAILY
Status: DISCONTINUED | OUTPATIENT
Start: 2023-07-19 | End: 2023-07-21 | Stop reason: HOSPADM

## 2023-07-19 RX ORDER — CLOPIDOGREL BISULFATE 75 MG/1
600 TABLET ORAL ONCE
Status: COMPLETED | OUTPATIENT
Start: 2023-07-19 | End: 2023-07-19

## 2023-07-19 RX ORDER — SODIUM CHLORIDE 0.9 % (FLUSH) 0.9 %
10 SYRINGE (ML) INJECTION EVERY 12 HOURS SCHEDULED
Status: DISCONTINUED | OUTPATIENT
Start: 2023-07-19 | End: 2023-07-21 | Stop reason: HOSPADM

## 2023-07-19 RX ORDER — ACETAMINOPHEN 325 MG/1
650 TABLET ORAL EVERY 4 HOURS PRN
Status: DISCONTINUED | OUTPATIENT
Start: 2023-07-19 | End: 2023-07-21 | Stop reason: HOSPADM

## 2023-07-19 RX ORDER — ONDANSETRON 2 MG/ML
4 INJECTION INTRAMUSCULAR; INTRAVENOUS EVERY 6 HOURS PRN
Status: DISCONTINUED | OUTPATIENT
Start: 2023-07-19 | End: 2023-07-21 | Stop reason: HOSPADM

## 2023-07-19 RX ORDER — VALSARTAN 80 MG/1
80 TABLET ORAL DAILY
Status: DISCONTINUED | OUTPATIENT
Start: 2023-07-19 | End: 2023-07-19

## 2023-07-19 RX ORDER — ASPIRIN 300 MG/1
300 SUPPOSITORY RECTAL DAILY
Status: DISCONTINUED | OUTPATIENT
Start: 2023-07-19 | End: 2023-07-21 | Stop reason: HOSPADM

## 2023-07-19 RX ADMIN — Medication 10 ML: at 13:51

## 2023-07-19 RX ADMIN — INSULIN HUMAN 4 UNITS: 100 INJECTION, SOLUTION PARENTERAL at 17:54

## 2023-07-19 RX ADMIN — Medication 10 ML: at 13:52

## 2023-07-19 RX ADMIN — Medication 5 MG: at 00:16

## 2023-07-19 RX ADMIN — ONDANSETRON 4 MG: 2 INJECTION INTRAMUSCULAR; INTRAVENOUS at 00:37

## 2023-07-19 RX ADMIN — Medication 10 MG: at 00:05

## 2023-07-19 RX ADMIN — LORAZEPAM 0.5 MG: 2 INJECTION INTRAMUSCULAR; INTRAVENOUS at 00:38

## 2023-07-19 RX ADMIN — IOPAMIDOL 115 ML: 755 INJECTION, SOLUTION INTRAVENOUS at 00:12

## 2023-07-19 RX ADMIN — SODIUM CHLORIDE 75 ML/HR: 9 INJECTION, SOLUTION INTRAVENOUS at 06:55

## 2023-07-19 RX ADMIN — NICARDIPINE HYDROCHLORIDE 5 MG/HR: 25 INJECTION, SOLUTION INTRAVENOUS at 04:26

## 2023-07-19 RX ADMIN — ATORVASTATIN CALCIUM 80 MG: 40 TABLET, FILM COATED ORAL at 21:52

## 2023-07-19 RX ADMIN — ATORVASTATIN CALCIUM 80 MG: 40 TABLET, FILM COATED ORAL at 00:44

## 2023-07-19 RX ADMIN — CLOPIDOGREL BISULFATE 600 MG: 75 TABLET ORAL at 00:45

## 2023-07-19 RX ADMIN — Medication 5 MG: at 00:11

## 2023-07-19 RX ADMIN — INSULIN HUMAN 3 UNITS: 100 INJECTION, SOLUTION PARENTERAL at 14:37

## 2023-07-19 RX ADMIN — ASPIRIN 325 MG: 325 TABLET ORAL at 12:15

## 2023-07-19 NOTE — H&P
"    [facility] Medicine Services  HISTORY AND PHYSICAL    Patient Name: Dianne Barry  : 1945  MRN: [id number]  Primary Care Physician: Daron Dotson MD  Date of admission: 2023      Subjective   Subjective     Chief Complaint:  Dysarthria, mental status change    HPI:  Dianne Barry is a 78 y.o. female who is accompanied by her daughters in the ED during my visit; the daughters provide history and fall, as the patient cannot reliably do so.  The daughter states that the patient was in her normal state of health until around 7:30 PM tonight (Tuesday).  They state they were at dinner at a local restaurant and, soon after leaving, they noticed the patient \"just talking nonsense.\"  They state that this was acute in its onset, and it appeared that the patient \"knew what she wanted to say but was frustrated because she could not get the right words out.\"  They also state that there appeared to be an actual mechanical difficulty forming the words.  They state that she \"started to just not make any sense,\" and was slightly less interactive.  They took her home, but 2 hours later the patient called one of her daughters and said that \"something is wrong,\" and requested one of her daughters come to see her.  They eventually came to the ED for further evaluation.  The patient had mentioned some tingling in the left fingers as well as \"a headache behind her left eye\" which had resolved before she came to the ED.  The daughters deny any facial droop or gait issues.  No mention of any C/O chest pain, shortness of breath, fever/chills, nausea/emesis, abdominal pain, bowel habit change, or any recent medication/dietary changes.  The daughter states that the patient had 1 alcoholic beverage at dinner but that is all.  Her medical history is significant for hypertension, hyperlipidemia, prior TIA, and partial colectomy though the daughters do not know why this was done.      Review of Systems "   Neurological:  Positive for speech difficulty and headaches.        As in HPI   All other systems reviewed and are negative.       Personal History     Past Medical History:   Diagnosis Date    Arthritis     Bacterial UTI 2017    HOSPITALIZED FOR SEPSIS    Cancer     Cataracts, bilateral     Chronic diastolic congestive heart failure 2018    Coronary artery disease involving native coronary artery of native heart without angina pectoris 2018    Diabetes mellitus     Gall stones     Hip fracture, right     History of transfusion     Hypertension              Past Surgical History:   Procedure Laterality Date    ABDOMINAL SURGERY      CARDIAC CATHETERIZATION N/A 10/17/2017    Procedure: Left Heart Cath;  Surgeon: Oral Velazco MD;  Location: [facility] CATH INVASIVE LOCATION;  Service:     CATARACT EXTRACTION       SECTION      COLON SURGERY      COLONOSCOPY      CYSTOSCOPY      EYE SURGERY      FRACTURE SURGERY      HYSTERECTOMY      MULTIPLE TOOTH EXTRACTIONS      OOPHORECTOMY      TUBAL ABDOMINAL LIGATION         Family History: family history includes Depression in an other family member; Diabetes in her mother; Heart attack in her father and mother; Heart disease in her mother; Hyperlipidemia in her mother; Hypertension in her mother.     Social History:  reports that she has never smoked. She has never used smokeless tobacco. She reports current alcohol use. She reports that she does not use drugs.  Social History     Social History Narrative    Not on file       Medications:  Available home medication information reviewed.  (Not in a hospital admission)      Allergies   Allergen Reactions    Darvon [Propoxyphene] Hallucinations     And DARVOCET    Atorvastatin Myalgia       Objective   Objective     Vital Signs:   Temp:  [98.7 °F (37.1 °C)] 98.7 °F (37.1 °C)  Heart Rate:  [74-99] 90  Resp:  [18] 18  BP: (139-220)/() 185/99  Total (NIH Stroke Scale): 3    Physical Exam   Constitutional:  Somnolent, follows commands, does not answer questions appropriately.  Eyes: PERRLA, sclerae anicteric, no conjunctival injection  HENT: NCAT, mucous membranes moist  Neck: Supple, no thyromegaly, no lymphadenopathy, trachea midline  Respiratory: Clear to auscultation bilaterally, nonlabored respirations   Cardiovascular: RRR, no murmurs, rubs, or gallops, palpable pedal pulses bilaterally  Gastrointestinal: Positive bowel sounds, soft, nontender, nondistended  Musculoskeletal: No bilateral ankle edema, no clubbing or cyanosis to extremities  Psychiatric: Appropriate affect, cooperative  Neurologic:  Does not answer questions appropriately, appears frustrated when attempting to speak, which she is able to confirm.  Answers to questions are non sequiturs or unintelligible sounds.  Otherwise cranial nerves are normal.  No strength deficit in any extremity.  No facial droop.  Skin: No rashes, normal turgor.    Result Review:  I have personally reviewed the results from the time of this admission to 7/19/2023 05:11 EDT and agree with these findings:  [x]  Laboratory list / accordion  []  Microbiology  [x]  Radiology  []  EKG/Telemetry   []  Cardiology/Vascular   []  Pathology  []  Old records  []  Other:  Most notable findings include: I could not review her EKG due to malfunction in computer software.  I reviewed EKG which by my read shows no acute infiltrate/edema on this single AP view.        LAB RESULTS:      Lab 07/18/23 2354 07/18/23 2351   WBC 4.91  --    HEMOGLOBIN 12.1  --    HEMOGLOBIN, POC  --  12.9   HEMATOCRIT 38.7  --    HEMATOCRIT POC  --  38   PLATELETS 173  --    NEUTROS ABS 3.49  --    IMMATURE GRANS (ABS) 0.07*  --    LYMPHS ABS 0.77  --    MONOS ABS 0.47  --    EOS ABS 0.08  --    MCV 86.0  --    APTT 30.5  --          Lab 07/18/23 2351   CREATININE 0.90   EGFR 65.6         Lab 07/18/23 2354   ALT (SGPT) 8   AST (SGOT) 15         Lab 07/18/23 2354   HSTROP T 8                     Microbiology  Results (last 10 days)       ** No results found for the last 240 hours. **            CT Angiogram Neck    Result Date: 7/19/2023  EXAMINATION: CT ANGIOGRAPHY HEAD AND NECK, CT PERFUSION DATE:  7/18/2023 INDICATION: Slurred speech. Aphasia. COMPARISON: Noncontrast CT head 7/18/2023 TECHNIQUE: CT angiography through the head and neck was performed in the axial plane using 115 mL of Isovue-370 administered intravenously, without adverse reaction. Coronal and sagittal MIP and MPR images were generated. CT perfusion imaging was performed and color maps were generated for cerebral blood flow, cerebral blood volume, mean transit time, time to drain, and Tmax. Rapid AI perfusion analysis was included.CT dose lowering techniques were used, to include: automated exposure control, adjustment for patient size, and or use of iterative reconstruction. Stenoses measured based on NASCET criteria. FINDINGS: CTA neck: Arch and great vessels: Ectasia and mild to moderate atherosclerotic calcification at the aortic arch. Great vessel origins are patent. Right carotid: The common carotid, internal carotid, and external carotid arteries are without occlusion, significant stenosis, or evidence of dissection. Atherosclerotic calcification at the carotid bifurcation and carotid bulb. Left carotid: The common carotid, internal carotid, and external carotid arteries are without occlusion, significant stenosis, or evidence of dissection. Atherosclerotic calcification at the carotid bifurcation and carotid bulb. Vertebral arteries: The vertebral arteries are uniform in caliber, with no occlusion, significant stenosis, or evidence of dissection. Osseous and soft tissue structures: Minimal cervical lordotic reversal. Moderate disc height loss at C4-5. Severe disc height loss at C5-6 and C6-7. Multilevel facet and uncinate hypertrophy. Associated neural foraminal narrowing is most pronounced on the left at C4-5 and bilaterally at C5-6. CTA Head:  Anterior Circulation: Mild atherosclerotic calcification of the internal carotid arteries. No occlusion or significant stenosis. Moderate stenosis at the right MCA M1 segment, with focal atherosclerotic calcification. Moderate stenoses in some of the right MCA M2 branches. No occlusion. Bilateral ACAs and left MCA are widely patent. Posterior Circulation: The vertebrobasilar system, superior cerebellar arteries, and posterior cerebral arteries are normal in caliber, with no occlusion, significant stenosis, or aneurysmal dilation. CT Perfusion: Blood vessel density: Recent blood vessel density in the right MCA territory (45-60% of normal). Qualitative color maps and column views: No focal deficit or significant perfusion asymmetry identified. Quantitative analysis: Volume of CBV < 34% 0 mL Volume of CBV < 38%: 0 mL Volume of CBV < 42%: 0 mL Volume of CBF< 20%: 0 mL Volume of CBF < 30%: 0 mL Volume of CBF < 34%: 0 mL Volume of CBF < 38%: 0 mL Volume of Tmax >10 seconds: 0 mL Volume of Tmax > 8 seconds: 0 mL Volume of Tmax > 6 seconds: 7 mL, involving bilateral anterior temporal lobes. Volume of Tmax > 4 seconds: 26 mL, involving bilateral temporal lobes, right greater than left cerebellar hemispheres, and patchy distribution in the periventricular white matter. This is probably artifact or otherwise clinically insignificant. Mismatch: Cerebral blood flow of less than 30% of normal and Tmax of greater than 6 seconds is used for calculation of the mismatch volume ratio. Calculated mismatch of 7 mL, but likely insignificant given the distribution described above.     Impression: CTA NECK: 1. No occlusion, significant stenosis, or evidence of dissection in the cervical carotid or vertebral arteries. 2. Atherosclerotic calcification at the carotid bifurcations and carotid bulbs. 3. Cervical spine degenerative changes as described above. CTA HEAD: 1. Moderate stenoses of the right MCA M1 segment and some of the M2 branches.  2. Mild ICA atherosclerosis. 3. No large vessel occlusion. CT PERFUSION: 1. Reduced blood vessel density in the right MCA territory, likely secondary to the stenoses described above. 2. Patchy areas of elevated Tmax in bilateral cerebral hemispheres as described above, likely artifactual or otherwise clinically insignificant. No suspicious perfusion abnormality is identified. If there remains high suspicion for acute ischemic infarct, MRI would be more sensitive. Electronically signed by:  Henrique Olea M.D.  7/18/2023 10:58 PM Mountain Time    MRI Brain Without Contrast    Result Date: 7/19/2023  EXAMINATION: MRI BRAIN WO CONTRAST DATE: 7/19/2023 3:40 AM  HISTORY: Follow-up stroke COMPARISON: MRI of 3/6/2023. A and perfusion of 7/18/2023.  TECHNIQUE: Multiplanar, multisequence MR imaging of the brain was performed without intraveous contrast. FINDINGS: Image quality: Degraded by motion artifact  Ventricles/Extra-axial Spaces: Enlarged by global parenchymal volume loss. Parenchyma: Diffusion-weighted imaging shows no evidence for acute ischemia/infarction.   Patchy areas of T2 prolongation are present in the hemispheric white matter, statistically most likely from chronic small vessel ischemic changes. There are lacunar  infarcts and/or prominent perivascular spaces in the basal ganglia and thalami. No gross mass effect or midline shift.  No gross midline structural abnormalities are detected on sagittal imaging. Intracranial Hemorrhage: None detected. Bones/Extracranial soft tissues: There is normal marrow signal in the skull base.  Visualized Sinuses/Mastoids: Trace mastoid fluid, likely incidental. Vascular:  Arterial flow voids at the level of the skull base are within normal limits.     Impression:  1.  Motion artifact degraded exam. 2.  No acute intracranial process detected. 3.  Volume loss and mild chronic small vessel ischemic changes.            Electronically signed by:  Oral Boyce M.D.  7/19/2023  2:54 AM Mountain Time    XR Chest 1 View    Result Date: 7/19/2023  EXAM:  XR CHEST 1 VW   DATE: 7/19/2023 12:03 AM HISTORY:  Acute Stroke Protocol (onset < 12 hrs) COMPARISON:  9/30/2017, 9/28/2017 FINDINGS and     Impression: 1.  Right lower lung bilobed nodular density 2.2 x 1.1 cm. May have been present previously, it is more conspicuous now. Consider calcifications. Recommend comparison with priors since 2017. If not available, consider CT chest without contrast within 2 weeks. 2.  Heart size is normal. 3.  No acute bony findings. Electronically signed by:  Lynda Agrawal M.D.  7/18/2023 11:26 PM Mountain Time    CT Head Without Contrast Stroke Protocol    Result Date: 7/19/2023  EXAMINATION: CT HEAD WITHOUT CONTRAST DATE: 7/18/2023 11:40 PM  INDICATION: STROKE ALERT, HEADACHE, APHASIA  COMPARISON: 2/21/23.  TECHNIQUE:  Routine axial images through the head without contrast. Coronal reformations. Low-dose CT acquisition technique included one or more of the following options: 1. Automated exposure control, 2. Adjustment of mA and/or KV according to patient's size and/or 3. Use of iterative reconstruction. FINDINGS:  Intracranial contents: New hypodensity right basal ganglia measuring 6 x 8 mm. Ventricular and cisternal spaces are prominent from volume loss. Mild periventricular and subcortical white matter hypodensities. No dominant mass, midline shift, hydrocephalus, extra axial fluid collection or acute hemorrhage. No asymmetric hyperdensity of the proximal major cerebral arteries. Craniocervical junction is patent. Bones and extracranial soft tissues: Mild mucosal thickening ethmoid air cells. Otherwise, Visualized paranasal sinuses and mastoid air cells are clear.  Skull is intact. Visualized intraorbital contents are unremarkable.     Impression: 1. No acute intracranial hemorrhage. Age indeterminate infarct right basal ganglia. MRI is more sensitive for the detection of acute nonhemorrhagic infarct. . 2.  Mild changes small vessel ischemic disease of indeterminate age, presumably mostly chronic. Volume loss. Atherosclerosis. Report called to LAUREN PADILLA at 7/18/2023 9:55 PM Electronically signed by:  Ranjith Bo M.D.  7/18/2023 10:04 PM Mountain Time    XR Abdomen KUB    Result Date: 7/19/2023  EXAMINATION:   XR ABDOMEN KUB INDICATION:  Clearance for MRI COMPARISON: None available.     Impression: Right upper quadrant surgical clips are present. Otherwise no metallic foreign body identified. Contrast opacifies the bladder. Thank you for the referral of this patient. This exam was interpreted by an American Board of Radiology certified radiologist with subspecialty fellowship training in body imaging.   If there are any questions regarding this exam please feel free to contact a radiologist directly at [phone]. Electronically signed by:  Whit Walker M.D.  7/19/2023 1:21 AM Mountain Time    CT Angiogram Head w AI Analysis of LVO    Result Date: 7/19/2023  EXAMINATION: CT ANGIOGRAPHY HEAD AND NECK, CT PERFUSION DATE:  7/18/2023 INDICATION: Slurred speech. Aphasia. COMPARISON: Noncontrast CT head 7/18/2023 TECHNIQUE: CT angiography through the head and neck was performed in the axial plane using 115 mL of Isovue-370 administered intravenously, without adverse reaction. Coronal and sagittal MIP and MPR images were generated. CT perfusion imaging was performed and color maps were generated for cerebral blood flow, cerebral blood volume, mean transit time, time to drain, and Tmax. Rapid AI perfusion analysis was included.CT dose lowering techniques were used, to include: automated exposure control, adjustment for patient size, and or use of iterative reconstruction. Stenoses measured based on NASCET criteria. FINDINGS: CTA neck: Arch and great vessels: Ectasia and mild to moderate atherosclerotic calcification at the aortic arch. Great vessel origins are patent. Right carotid: The common carotid, internal  carotid, and external carotid arteries are without occlusion, significant stenosis, or evidence of dissection. Atherosclerotic calcification at the carotid bifurcation and carotid bulb. Left carotid: The common carotid, internal carotid, and external carotid arteries are without occlusion, significant stenosis, or evidence of dissection. Atherosclerotic calcification at the carotid bifurcation and carotid bulb. Vertebral arteries: The vertebral arteries are uniform in caliber, with no occlusion, significant stenosis, or evidence of dissection. Osseous and soft tissue structures: Minimal cervical lordotic reversal. Moderate disc height loss at C4-5. Severe disc height loss at C5-6 and C6-7. Multilevel facet and uncinate hypertrophy. Associated neural foraminal narrowing is most pronounced on the left at C4-5 and bilaterally at C5-6. CTA Head: Anterior Circulation: Mild atherosclerotic calcification of the internal carotid arteries. No occlusion or significant stenosis. Moderate stenosis at the right MCA M1 segment, with focal atherosclerotic calcification. Moderate stenoses in some of the right MCA M2 branches. No occlusion. Bilateral ACAs and left MCA are widely patent. Posterior Circulation: The vertebrobasilar system, superior cerebellar arteries, and posterior cerebral arteries are normal in caliber, with no occlusion, significant stenosis, or aneurysmal dilation. CT Perfusion: Blood vessel density: Recent blood vessel density in the right MCA territory (45-60% of normal). Qualitative color maps and column views: No focal deficit or significant perfusion asymmetry identified. Quantitative analysis: Volume of CBV < 34% 0 mL Volume of CBV < 38%: 0 mL Volume of CBV < 42%: 0 mL Volume of CBF< 20%: 0 mL Volume of CBF < 30%: 0 mL Volume of CBF < 34%: 0 mL Volume of CBF < 38%: 0 mL Volume of Tmax >10 seconds: 0 mL Volume of Tmax > 8 seconds: 0 mL Volume of Tmax > 6 seconds: 7 mL, involving bilateral anterior temporal  lobes. Volume of Tmax > 4 seconds: 26 mL, involving bilateral temporal lobes, right greater than left cerebellar hemispheres, and patchy distribution in the periventricular white matter. This is probably artifact or otherwise clinically insignificant. Mismatch: Cerebral blood flow of less than 30% of normal and Tmax of greater than 6 seconds is used for calculation of the mismatch volume ratio. Calculated mismatch of 7 mL, but likely insignificant given the distribution described above.     Impression: CTA NECK: 1. No occlusion, significant stenosis, or evidence of dissection in the cervical carotid or vertebral arteries. 2. Atherosclerotic calcification at the carotid bifurcations and carotid bulbs. 3. Cervical spine degenerative changes as described above. CTA HEAD: 1. Moderate stenoses of the right MCA M1 segment and some of the M2 branches. 2. Mild ICA atherosclerosis. 3. No large vessel occlusion. CT PERFUSION: 1. Reduced blood vessel density in the right MCA territory, likely secondary to the stenoses described above. 2. Patchy areas of elevated Tmax in bilateral cerebral hemispheres as described above, likely artifactual or otherwise clinically insignificant. No suspicious perfusion abnormality is identified. If there remains high suspicion for acute ischemic infarct, MRI would be more sensitive. Electronically signed by:  Henrique Olea M.D.  7/18/2023 10:58 PM Mountain Time    CT CEREBRAL PERFUSION WITH & WITHOUT CONTRAST    Result Date: 7/19/2023  EXAMINATION: CT ANGIOGRAPHY HEAD AND NECK, CT PERFUSION DATE:  7/18/2023 INDICATION: Slurred speech. Aphasia. COMPARISON: Noncontrast CT head 7/18/2023 TECHNIQUE: CT angiography through the head and neck was performed in the axial plane using 115 mL of Isovue-370 administered intravenously, without adverse reaction. Coronal and sagittal MIP and MPR images were generated. CT perfusion imaging was performed and color maps were generated for cerebral blood flow,  cerebral blood volume, mean transit time, time to drain, and Tmax. Rapid AI perfusion analysis was included.CT dose lowering techniques were used, to include: automated exposure control, adjustment for patient size, and or use of iterative reconstruction. Stenoses measured based on NASCET criteria. FINDINGS: CTA neck: Arch and great vessels: Ectasia and mild to moderate atherosclerotic calcification at the aortic arch. Great vessel origins are patent. Right carotid: The common carotid, internal carotid, and external carotid arteries are without occlusion, significant stenosis, or evidence of dissection. Atherosclerotic calcification at the carotid bifurcation and carotid bulb. Left carotid: The common carotid, internal carotid, and external carotid arteries are without occlusion, significant stenosis, or evidence of dissection. Atherosclerotic calcification at the carotid bifurcation and carotid bulb. Vertebral arteries: The vertebral arteries are uniform in caliber, with no occlusion, significant stenosis, or evidence of dissection. Osseous and soft tissue structures: Minimal cervical lordotic reversal. Moderate disc height loss at C4-5. Severe disc height loss at C5-6 and C6-7. Multilevel facet and uncinate hypertrophy. Associated neural foraminal narrowing is most pronounced on the left at C4-5 and bilaterally at C5-6. CTA Head: Anterior Circulation: Mild atherosclerotic calcification of the internal carotid arteries. No occlusion or significant stenosis. Moderate stenosis at the right MCA M1 segment, with focal atherosclerotic calcification. Moderate stenoses in some of the right MCA M2 branches. No occlusion. Bilateral ACAs and left MCA are widely patent. Posterior Circulation: The vertebrobasilar system, superior cerebellar arteries, and posterior cerebral arteries are normal in caliber, with no occlusion, significant stenosis, or aneurysmal dilation. CT Perfusion: Blood vessel density: Recent blood vessel  density in the right MCA territory (45-60% of normal). Qualitative color maps and column views: No focal deficit or significant perfusion asymmetry identified. Quantitative analysis: Volume of CBV < 34% 0 mL Volume of CBV < 38%: 0 mL Volume of CBV < 42%: 0 mL Volume of CBF< 20%: 0 mL Volume of CBF < 30%: 0 mL Volume of CBF < 34%: 0 mL Volume of CBF < 38%: 0 mL Volume of Tmax >10 seconds: 0 mL Volume of Tmax > 8 seconds: 0 mL Volume of Tmax > 6 seconds: 7 mL, involving bilateral anterior temporal lobes. Volume of Tmax > 4 seconds: 26 mL, involving bilateral temporal lobes, right greater than left cerebellar hemispheres, and patchy distribution in the periventricular white matter. This is probably artifact or otherwise clinically insignificant. Mismatch: Cerebral blood flow of less than 30% of normal and Tmax of greater than 6 seconds is used for calculation of the mismatch volume ratio. Calculated mismatch of 7 mL, but likely insignificant given the distribution described above.     Impression: CTA NECK: 1. No occlusion, significant stenosis, or evidence of dissection in the cervical carotid or vertebral arteries. 2. Atherosclerotic calcification at the carotid bifurcations and carotid bulbs. 3. Cervical spine degenerative changes as described above. CTA HEAD: 1. Moderate stenoses of the right MCA M1 segment and some of the M2 branches. 2. Mild ICA atherosclerosis. 3. No large vessel occlusion. CT PERFUSION: 1. Reduced blood vessel density in the right MCA territory, likely secondary to the stenoses described above. 2. Patchy areas of elevated Tmax in bilateral cerebral hemispheres as described above, likely artifactual or otherwise clinically insignificant. No suspicious perfusion abnormality is identified. If there remains high suspicion for acute ischemic infarct, MRI would be more sensitive. Electronically signed by:  Henrique Olea M.D.  7/18/2023 10:58 PM Mountain Time     Results for orders placed during the  hospital encounter of 03/02/23    Adult Transthoracic Echo Complete W/ Cont if Necessary Per Protocol    Interpretation Summary    Left ventricular systolic function is normal. Left ventricular ejection fraction appears to be 56 - 60%.    Mild aortic valve regurgitation is present.    Mild tricuspid valve regurgitation is present.    Mild dilation of the ascending aorta is present at 4.2 cm.      Assessment & Plan   Assessment & Plan     Active Hospital Problems    Diagnosis  POA    **Dysarthria [R47.1]  Yes       78F with dysarthria, mental status change    Dysarthria  Mental status change  History of TIA  CVA work-up  - N.p.o. except for meds until swallow eval passed.  - Daily aspirin/statin/Plavix.  - Check 2D echo.  - Check HbA1c, lipid panel.  - PT/OT/SLP.  - MRI pending.  - We will follow-up neurology service recommendations.    Hypertension  - Continue valsartan and beta-blocker from home regimen.    Hyperlipidemia  - We will be on daily statin.  - Check lipid panel as above.    History of partial colectomy  - Daughters are not certain of the reason for this; chronic comorbidity, no acute issues.      Total time spent: 80 minutes  Time spent includes time reviewing chart, face-to-face time, counseling patient/family/caregiver, ordering medications/tests/procedures, communicating with other health care professionals, documenting clinical information in the electronic health record, and coordination of care.     DVT prophylaxis:  scds      CODE STATUS:  full  Code Status and Medical Interventions:   Ordered at: 07/19/23 0504     Code Status (Patient has no pulse and is not breathing):    CPR (Attempt to Resuscitate)     Medical Interventions (Patient has pulse or is breathing):    Full Support     Comments:    d/w daughters     Release to patient:    Routine Release       Expected Discharge tbhermelinda Deleon III, DO  07/19/23

## 2023-07-19 NOTE — ED PROVIDER NOTES
Subjective   History of Present Illness  78-year-old female with history of TIA (no known history of stroke or residual deficits) brought to the emergency department by a family friend for evaluation of difficulty speaking.  Symptoms were witnessed to begin approximately 3 hours prior to arrival.  Patient had associated temporal unilateral headache which was gradual in onset.  Family friend noticed slurred speech as well.  Patient denies any unilateral weakness or numbness, or recent trauma.  Patient denies taking anticoagulants.  Family friend took patient home despite her symptoms, and the patient then called the family friend back and asked her to bring her to the emergency department due to her symptoms being persistent.    Review of Systems   Constitutional:  Negative for diaphoresis and fever.   HENT:  Negative for drooling and facial swelling.    Eyes:  Negative for photophobia and discharge.   Respiratory:  Negative for shortness of breath and stridor.    Neurological:  Positive for speech difficulty and headaches.   Psychiatric/Behavioral:  The patient is nervous/anxious.      Past Medical History:   Diagnosis Date    Arthritis     Bacterial UTI 2017    HOSPITALIZED FOR SEPSIS    Cancer     Cataracts, bilateral     Chronic diastolic congestive heart failure 2018    Coronary artery disease involving native coronary artery of native heart without angina pectoris 2018    Diabetes mellitus     Gall stones     Hip fracture, right     History of transfusion     Hypertension        Allergies   Allergen Reactions    Darvon [Propoxyphene] Hallucinations     And DARVOCET    Atorvastatin Myalgia       Past Surgical History:   Procedure Laterality Date    ABDOMINAL SURGERY      CARDIAC CATHETERIZATION N/A 10/17/2017    Procedure: Left Heart Cath;  Surgeon: Oral Velazco MD;  Location: [facility] CATH INVASIVE LOCATION;  Service:     CATARACT EXTRACTION       SECTION      COLON SURGERY      COLONOSCOPY       CYSTOSCOPY      EYE SURGERY      FRACTURE SURGERY      HYSTERECTOMY      MULTIPLE TOOTH EXTRACTIONS      OOPHORECTOMY      TUBAL ABDOMINAL LIGATION         Family History   Problem Relation Age of Onset    Heart disease Mother     Hyperlipidemia Mother     Hypertension Mother     Diabetes Mother     Heart attack Mother     Heart attack Father     Depression Other     Breast cancer Neg Hx     Ovarian cancer Neg Hx        Social History     Socioeconomic History    Marital status:    Tobacco Use    Smoking status: Never    Smokeless tobacco: Never   Substance and Sexual Activity    Alcohol use: Yes     Comment: 4 times per month, 1 drink    Drug use: No    Sexual activity: Defer           Objective   Physical Exam  Vitals and nursing note reviewed.   Constitutional:       General: She is not in acute distress.     Appearance: She is not diaphoretic.      Comments: Fair historian.   HENT:      Mouth/Throat:      Mouth: Mucous membranes are moist.   Eyes:      General: No scleral icterus.     Extraocular Movements: Extraocular movements intact.      Pupils: Pupils are equal, round, and reactive to light.      Comments: No photophobia or nystagmus.   Neck:      Comments: No meningismus.  Cardiovascular:      Rate and Rhythm: Normal rate and regular rhythm.      Heart sounds: Normal heart sounds. No murmur heard.    No friction rub. No gallop.   Pulmonary:      Effort: Pulmonary effort is normal. No respiratory distress.      Breath sounds: Normal breath sounds. No stridor. No wheezing, rhonchi or rales.   Abdominal:      General: There is no distension.      Palpations: Abdomen is soft.      Tenderness: There is no abdominal tenderness. There is no guarding or rebound.   Musculoskeletal:      Cervical back: Neck supple.   Skin:     General: Skin is warm and dry.      Coloration: Skin is not cyanotic.   Neurological:      Mental Status: She is alert.      Comments: Dysarthria (mild). Expressive aphasia.   Follows commands, but requires repeated requests intermittently.  No facial droop. Motor 5/5 power x 4 extremities, symmetric. Sensation grossly intact to light touch. Rapid alternating movements within normal limits. No dysmetria or past-pointing on finger-to-nose testing.                   ED Course  ED Course as of 07/19/23 0150   Wed Jul 19, 2023   0046 Patient was seen as a stroke alert in the emergency department and evaluated concurrently with stroke navigator, KEISHA Reyes.  We considered administering IV tenecteplase, for possible thrombotic stroke, however, her blood pressure was poorly controlled, and despite giving multiple doses of IV labetalol, remained elevated until her symptoms had started over 4.5 hours previously.  Therefore, no IV tenecteplase was administered due to being greater than 4.5 hours since symptom onset.  Plan is for admission and further therapies for possible acute stroke versus hypertensive emergency versus complex migraine versus less likely other etiologies. [LD]      ED Course User Index  [LD] Donna Owens MD                                           Medical Decision Making  Differential diagnosis includes ischemic stroke, hemorrhagic stroke, hypertensive emergency, complicated migraine, less likely seizure, and other etiologies.    Problems Addressed:  Dysarthria: complicated acute illness or injury  Expressive aphasia: complicated acute illness or injury  Nonintractable headache, unspecified chronicity pattern, unspecified headache type: complicated acute illness or injury  Poorly-controlled hypertension: complicated acute illness or injury    Amount and/or Complexity of Data Reviewed  Independent Historian:      Details: 2 family friends at bedside.  Labs: ordered. Decision-making details documented in ED Course.  Radiology: ordered. Decision-making details documented in ED Course.  ECG/medicine tests: ordered and independent interpretation performed.  Decision-making details documented in ED Course.     Details: EKG at 0013 shows sinus rhythm with first-degree AV block at a rate of 87 bpm, no acute ischemic changes.  Discussion of management or test interpretation with external provider(s): Discussed with stroke navigator multiple times throughout the patient's emergency department course, KEISHA Reyes.  Will contact hospitalist regarding admission, Dr. Deleon.    Risk  Prescription drug management.  Decision regarding hospitalization.      Recent Results (from the past 24 hour(s))   POC CHEM 8    Collection Time: 07/18/23 11:51 PM    Specimen: Venous Blood   Result Value Ref Range    Glucose 209 (H) 70 - 130 mg/dL    BUN 18 8 - 26 mg/dL    Creatinine 0.90 0.60 - 1.30 mg/dL    Sodium 138 138 - 146 mmol/L    POC Potassium 3.5 3.5 - 4.9 mmol/L    Chloride 101 98 - 109 mmol/L    Total CO2 24 24 - 29 mmol/L    Hemoglobin 12.9 12.0 - 17.0 g/dL    Hematocrit 38 38 - 51 %    Ionized Calcium 1.19 (L) 1.20 - 1.32 mmol/L    eGFR 65.6 >60.0 mL/min/1.73   Single High Sensitivity Troponin T    Collection Time: 07/18/23 11:54 PM    Specimen: Arm, Right; Blood   Result Value Ref Range    HS Troponin T 8 <10 ng/L   aPTT    Collection Time: 07/18/23 11:54 PM    Specimen: Arm, Right; Blood   Result Value Ref Range    PTT 30.5 22.0 - 39.0 seconds   AST    Collection Time: 07/18/23 11:54 PM    Specimen: Arm, Right; Blood   Result Value Ref Range    AST (SGOT) 15 1 - 32 U/L   ALT    Collection Time: 07/18/23 11:54 PM    Specimen: Arm, Right; Blood   Result Value Ref Range    ALT (SGPT) 8 1 - 33 U/L   Green Top (Gel)    Collection Time: 07/18/23 11:54 PM   Result Value Ref Range    Extra Tube Hold for add-ons.    Lavender Top    Collection Time: 07/18/23 11:54 PM   Result Value Ref Range    Extra Tube hold for add-on    Gold Top - SST    Collection Time: 07/18/23 11:54 PM   Result Value Ref Range    Extra Tube Hold for add-ons.    Light Blue Top    Collection Time: 07/18/23  11:54 PM   Result Value Ref Range    Extra Tube Hold for add-ons.    CBC Auto Differential    Collection Time: 07/18/23 11:54 PM    Specimen: Arm, Right; Blood   Result Value Ref Range    WBC 4.91 3.40 - 10.80 10*3/mm3    RBC 4.50 3.77 - 5.28 10*6/mm3    Hemoglobin 12.1 12.0 - 15.9 g/dL    Hematocrit 38.7 34.0 - 46.6 %    MCV 86.0 79.0 - 97.0 fL    MCH 26.9 26.6 - 33.0 pg    MCHC 31.3 (L) 31.5 - 35.7 g/dL    RDW 12.3 12.3 - 15.4 %    RDW-SD 38.5 37.0 - 54.0 fl    MPV 9.9 6.0 - 12.0 fL    Platelets 173 140 - 450 10*3/mm3    Neutrophil % 71.1 42.7 - 76.0 %    Lymphocyte % 15.7 (L) 19.6 - 45.3 %    Monocyte % 9.6 5.0 - 12.0 %    Eosinophil % 1.6 0.3 - 6.2 %    Basophil % 0.6 0.0 - 1.5 %    Immature Grans % 1.4 (H) 0.0 - 0.5 %    Neutrophils, Absolute 3.49 1.70 - 7.00 10*3/mm3    Lymphocytes, Absolute 0.77 0.70 - 3.10 10*3/mm3    Monocytes, Absolute 0.47 0.10 - 0.90 10*3/mm3    Eosinophils, Absolute 0.08 0.00 - 0.40 10*3/mm3    Basophils, Absolute 0.03 0.00 - 0.20 10*3/mm3    Immature Grans, Absolute 0.07 (H) 0.00 - 0.05 10*3/mm3    nRBC 0.0 0.0 - 0.2 /100 WBC   ECG 12 Lead ED Triage Standing Order; Acute Stroke (Onset <24 hrs)    Collection Time: 07/19/23 12:13 AM   Result Value Ref Range    QT Interval 368 ms    QTC Interval 442 ms     Note: In addition to lab results from this visit, the labs listed above may include labs taken at another facility or during a different encounter within the last 24 hours. Please correlate lab times with ED admission and discharge times for further clarification of the services performed during this visit.    XR Chest 1 View   Final Result   1.  Right lower lung bilobed nodular density 2.2 x 1.1 cm. May have been present previously, it is more conspicuous now. Consider calcifications. Recommend comparison with priors since 2017. If not available, consider CT chest without contrast within 2    weeks.   2.  Heart size is normal.    3.  No acute bony findings.          Electronically  signed by:  Lynda Agrawal M.D.     7/18/2023 11:26 PM Mountain Time      CT Angiogram Head w AI Analysis of LVO   Final Result         CTA NECK:      1. No occlusion, significant stenosis, or evidence of dissection in the cervical carotid or vertebral arteries.      2. Atherosclerotic calcification at the carotid bifurcations and carotid bulbs.      3. Cervical spine degenerative changes as described above.         CTA HEAD:      1. Moderate stenoses of the right MCA M1 segment and some of the M2 branches.      2. Mild ICA atherosclerosis.      3. No large vessel occlusion.         CT PERFUSION:         1. Reduced blood vessel density in the right MCA territory, likely secondary to the stenoses described above.      2. Patchy areas of elevated Tmax in bilateral cerebral hemispheres as described above, likely artifactual or otherwise clinically insignificant. No suspicious perfusion abnormality is identified. If there remains high suspicion for acute ischemic    infarct, MRI would be more sensitive.               Electronically signed by:  Henrique Olea M.D.     7/18/2023 10:58 PM Mountain Time      CT Angiogram Neck   Final Result         CTA NECK:      1. No occlusion, significant stenosis, or evidence of dissection in the cervical carotid or vertebral arteries.      2. Atherosclerotic calcification at the carotid bifurcations and carotid bulbs.      3. Cervical spine degenerative changes as described above.         CTA HEAD:      1. Moderate stenoses of the right MCA M1 segment and some of the M2 branches.      2. Mild ICA atherosclerosis.      3. No large vessel occlusion.         CT PERFUSION:         1. Reduced blood vessel density in the right MCA territory, likely secondary to the stenoses described above.      2. Patchy areas of elevated Tmax in bilateral cerebral hemispheres as described above, likely artifactual or otherwise clinically insignificant. No suspicious perfusion abnormality is identified. If  there remains high suspicion for acute ischemic    infarct, MRI would be more sensitive.               Electronically signed by:  Henrique Olea M.D.     7/18/2023 10:58 PM Mountain Time      CT CEREBRAL PERFUSION WITH & WITHOUT CONTRAST   Final Result         CTA NECK:      1. No occlusion, significant stenosis, or evidence of dissection in the cervical carotid or vertebral arteries.      2. Atherosclerotic calcification at the carotid bifurcations and carotid bulbs.      3. Cervical spine degenerative changes as described above.         CTA HEAD:      1. Moderate stenoses of the right MCA M1 segment and some of the M2 branches.      2. Mild ICA atherosclerosis.      3. No large vessel occlusion.         CT PERFUSION:         1. Reduced blood vessel density in the right MCA territory, likely secondary to the stenoses described above.      2. Patchy areas of elevated Tmax in bilateral cerebral hemispheres as described above, likely artifactual or otherwise clinically insignificant. No suspicious perfusion abnormality is identified. If there remains high suspicion for acute ischemic    infarct, MRI would be more sensitive.               Electronically signed by:  Henrique Olea M.D.     7/18/2023 10:58 PM Mountain Time      CT Head Without Contrast Stroke Protocol   Final Result   1. No acute intracranial hemorrhage. Age indeterminate infarct right basal ganglia. MRI is more sensitive for the detection of acute nonhemorrhagic infarct. .   2. Mild changes small vessel ischemic disease of indeterminate age, presumably mostly chronic. Volume loss. Atherosclerosis.      Report called to LAUREN PADILLA at 7/18/2023 9:55 PM      Electronically signed by:  Ranjith Bo M.D.     7/18/2023 10:04 PM Mountain Time      MRI Brain Without Contrast    (Results Pending)     Vitals:    07/19/23 0020 07/19/23 0026 07/19/23 0031 07/19/23 0100   BP: (!) 198/103 (!) 204/103 (!) 212/115 (!) 191/103   Pulse: 85 85 99 86   Resp:        Temp:       TempSrc:       SpO2: 98% 98% 95%      Medications   sodium chloride 0.9 % flush 10 mL (has no administration in time range)   sodium chloride 0.9 % flush 10 mL (has no administration in time range)   sodium chloride 0.9 % flush 10 mL (has no administration in time range)   sodium chloride 0.9 % infusion 40 mL (has no administration in time range)   atorvastatin (LIPITOR) tablet 80 mg (80 mg Oral Given 7/19/23 0044)   aspirin tablet 325 mg (has no administration in time range)     Or   aspirin suppository 300 mg (has no administration in time range)   clopidogrel (PLAVIX) tablet 600 mg (600 mg Oral Given 7/19/23 0045)     And   clopidogrel (PLAVIX) tablet 75 mg (has no administration in time range)   niCARdipine (CARDENE) 25mg in 250mL NS infusion (has no administration in time range)   labetalol (NORMODYNE,TRANDATE) injection 10 mg (10 mg Intravenous Given 7/19/23 0005)   labetalol (NORMODYNE,TRANDATE) injection 5 mg (5 mg Intravenous Given 7/19/23 0011)   iopamidol (ISOVUE-370) 76 % injection 150 mL (115 mL Intravenous Given 7/19/23 0012)   labetalol (NORMODYNE,TRANDATE) injection 5 mg (5 mg Intravenous Given 7/19/23 0016)   LORazepam (ATIVAN) injection 0.5 mg (0.5 mg Intravenous Given 7/19/23 0038)   ondansetron (ZOFRAN) injection 4 mg (4 mg Intravenous Given 7/19/23 0037)     ECG/EMG Results (last 24 hours)       Procedure Component Value Units Date/Time    ECG 12 Lead ED Triage Standing Order; Acute Stroke (Onset <24 hrs) [[id number]] Collected: 07/19/23 0013     Updated: 07/19/23 0013     QT Interval 368 ms      QTC Interval 442 ms     Narrative:      Test Reason : ED Triage Standing Order~  Blood Pressure :   */*   mmHG  Vent. Rate :  87 BPM     Atrial Rate :  87 BPM     P-R Int : 224 ms          QRS Dur :  66 ms      QT Int : 368 ms       P-R-T Axes :  67  14  38 degrees     QTc Int : 442 ms    ** Poor data quality, interpretation may be adversely affected  Sinus rhythm with 1st degree AV  block  Low voltage QRS  Cannot rule out Anterior infarct , age undetermined  Abnormal ECG  When compared with ECG of 17-OCT-2017 08:07,  IL interval has increased  Vent. rate has increased BY  38 BPM  Minimal criteria for Anterior infarct are now present    Referred By: EDMD           Confirmed By:           ECG 12 Lead ED Triage Standing Order; Acute Stroke (Onset <24 hrs)   Preliminary Result   Test Reason : ED Triage Standing Order~   Blood Pressure :   */*   mmHG   Vent. Rate :  87 BPM     Atrial Rate :  87 BPM      P-R Int : 224 ms          QRS Dur :  66 ms       QT Int : 368 ms       P-R-T Axes :  67  14  38 degrees      QTc Int : 442 ms      ** Poor data quality, interpretation may be adversely affected   Sinus rhythm with 1st degree AV block   Low voltage QRS   Cannot rule out Anterior infarct , age undetermined   Abnormal ECG   When compared with ECG of 17-OCT-2017 08:07,   IL interval has increased   Vent. rate has increased BY  38 BPM   Minimal criteria for Anterior infarct are now present      Referred By: EDMD           Confirmed By:               Final diagnoses:   Expressive aphasia   Dysarthria   Nonintractable headache, unspecified chronicity pattern, unspecified headache type   Poorly-controlled hypertension   Nodule of lower lobe of right lung       ED Disposition  ED Disposition       ED Disposition   Decision to Admit    Condition   --    Comment   --               No follow-up provider specified.       Medication List      No changes were made to your prescriptions during this visit.            Donna Owens MD  07/19/23 0050       Donna Owens MD  07/19/23 0150

## 2023-07-19 NOTE — CONSULTS
Stroke Consult Note    Patient Name: Dianne Barry   MRN: [id number]  Age: 78 y.o.  Sex: female  : 1945    Primary Care Physician: Daron Dotson MD  Referring Physician:  Graciela SIDHU    TIME STROKE TEAM CALLED:  EST     TIME PATIENT SEEN: 234 EST    Handedness: Right  Race:     Chief Complaint/Reason for Consultation: Slurred speech, difficulty with finding words, confusion    HPI:   This is Ms.Dianne Barry is a 78-year-old white female, right-handed with significant health diagnosis for remote TIA no reported residual deficits, hypertension,  arthritis, diabetes type 2, who presented to [facility] ED via private vehicle brought by family friend for evaluation of difficulty speaking, confusion.  Code stroke was called, patient was taken immediately to CT scanner.  Patient reports moderate headache left side rated as 5/10, localized, throbbing, not associated with photophobia or phonophobia.   Patient has difficulty finding words, mild to moderate dysarthria.  Patient negative for drooling, difficulty swallowing, facial droop, focal weakness, numbness or tingling, or gait problem.  Upon arrival patient is noted with hypertensive crisis blood pressure 220/105.  Patient last known well per family friends 7 PM on 2023. Patient is outside the window for TNK. Patient was given labetalol to lower blood pressure.  Patient received a full dose of 20 mg labetalol IV at that time patient blood pressure 181/97 with some intermittent speech improvement. Per family friends reports that patient was completely normal herself at dinner time at 7 PM when she was noted to have slurred speech after dinner patient went home then had difficulty finding the TV remote, and felt that she is confused called her family friend later when they decided to present to the ED for further evaluation.   Patient was reported to be independent not using any ambulatory devices, she denies tobacco use, alcohol or illicit drugs.  Patient  was reported to be compliant with her medication.    I spoke personally to Dr. Villasenor over the phone and patient will be medically managed since patient currently outside TNK window 4.5.    Last Known Normal Date/Time: 2023 EST     Review of Systems   Constitutional:  Positive for activity change.   HENT:  Positive for voice change.    Eyes:  Negative for visual disturbance.   Respiratory:  Negative for choking.    Cardiovascular:  Negative for chest pain.   Gastrointestinal:  Positive for nausea and vomiting.   Endocrine: Negative.    Genitourinary:  Negative for hematuria.   Musculoskeletal:  Negative for gait problem.   Neurological:  Positive for speech difficulty and headaches.   Hematological: Negative.    Psychiatric/Behavioral:  Positive for confusion. The patient is nervous/anxious.     Past Medical History:   Diagnosis Date    Arthritis     Bacterial UTI 2017    HOSPITALIZED FOR SEPSIS    Cancer     Cataracts, bilateral     Chronic diastolic congestive heart failure 2018    Coronary artery disease involving native coronary artery of native heart without angina pectoris 2018    Diabetes mellitus     Gall stones     Hip fracture, right     History of transfusion     Hypertension      Past Surgical History:   Procedure Laterality Date    ABDOMINAL SURGERY      CARDIAC CATHETERIZATION N/A 10/17/2017    Procedure: Left Heart Cath;  Surgeon: Oral Velazco MD;  Location: [facility] CATH INVASIVE LOCATION;  Service:     CATARACT EXTRACTION       SECTION      COLON SURGERY      COLONOSCOPY      CYSTOSCOPY      EYE SURGERY      FRACTURE SURGERY      HYSTERECTOMY      MULTIPLE TOOTH EXTRACTIONS      OOPHORECTOMY      TUBAL ABDOMINAL LIGATION       Family History   Problem Relation Age of Onset    Heart disease Mother     Hyperlipidemia Mother     Hypertension Mother     Diabetes Mother     Heart attack Mother     Heart attack Father     Depression Other     Breast cancer Neg Hx      Ovarian cancer Neg Hx      Social History     Socioeconomic History    Marital status:    Tobacco Use    Smoking status: Never    Smokeless tobacco: Never   Substance and Sexual Activity    Alcohol use: Yes     Comment: 4 times per month, 1 drink    Drug use: No    Sexual activity: Defer     Allergies   Allergen Reactions    Darvon [Propoxyphene] Hallucinations     And DARVOCET    Atorvastatin Myalgia     Prior to Admission medications    Medication Sig Start Date End Date Taking? Authorizing Provider   aspirin 81 MG EC tablet Take 81 mg by mouth Every Morning.    Kimmy Jang MD   BIOTIN PO Take 1 capsule by mouth Daily.    Kimmy Jang MD   CINNAMON PO Take 2 capsules by mouth Daily.    Kimmy Jang MD   estradiol (ESTRACE) 1 MG tablet Take 1 tablet by mouth Daily. 7/26/21   Kimmy Jang MD   glimepiride (AMARYL) 1 MG tablet Take 1 mg by mouth Daily. 6/17/21   Kimmy Jang MD   meclizine 25 MG chewable tablet chewable tablet Chew 25 mg As Needed.    Kimmy Jang MD   meloxicam (MOBIC) 7.5 MG tablet Take 7.5 mg by mouth As Needed.    Kimmy Jang MD   metoprolol tartrate (LOPRESSOR) 50 MG tablet Take 0.5 tablets by mouth Every 12 (Twelve) Hours. 3/16/22   Artem Barber PA   mupirocin (BACTROBAN) 2 % ointment 1 application into the nostril(s) as directed by provider Daily. 7/30/21   Kimmy Jang MD   Omega-3 Fatty Acids (FISH OIL) 1000 MG capsule capsule Take 2,000 mg by mouth Daily With Breakfast.    Kimmy Jang MD   rosuvastatin (CRESTOR) 5 MG tablet Every Other Day. 1/8/18   Kimmy Jang MD   Testosterone Propionate (FIRST-TESTOSTERONE) 2 % ointment Place 1 application on the skin as directed by provider Every Morning. PLACES ON INNER THIGH    Kimmy Jang MD   valsartan (DIOVAN) 80 MG tablet Take 1 tablet by mouth Daily. NEEDS APPOINTMENT FOR FUTURE REFILLS 6/14/23   Artem Barber PA          Temp:  [98.7 °F (37.1 °C)] 98.7 °F (37.1 °C)  Heart Rate:  [74-83] 81  Resp:  [18] 18  BP: (202-220)/(105-112) 202/112  Neurological Exam  Mental Status  Awake and alert. Oriented only to person. Moderate dysarthria present. Expressive aphasia present.    Cranial Nerves  CN II: Left visual acuity: Normal. Right superior quadrantanopsia. Left normal visual field.  CN III, IV, VI: Extraocular movements intact bilaterally. Normal lids and orbits bilaterally. Pupils equal round and reactive to light bilaterally.  CN V:  Right: Facial sensation is normal. Reduced corneal reflex.  Left: Facial sensation is normal on the left. Normal corneal reflex.  CN VII: Full and symmetric facial movement.  CN VIII: Intact hearing bilaterally.  CN XI:  Right: Sternocleidomastoid strength is normal.  Left: Sternocleidomastoid strength is normal.  CN XII: Tongue midline without atrophy or fasciculations.    Motor  Normal muscle bulk throughout. No fasciculations present. Normal muscle tone. No abnormal involuntary movements. Strength is 5/5 throughout all four extremities.    Sensory  Light touch is normal in upper and lower extremities.     Reflexes                                            Right                      Left  Plantar                           Downgoing                Downgoing    Right pathological reflexes: Ankle clonus absent.  Left pathological reflexes: Ankle clonus absent.    Coordination  Right: Finger-to-nose normal. Heel-to-shin normal.Left: Finger-to-nose normal. Heel-to-shin normal.    Gait  Casual gait is normal including stance, stride, and arm swing.    Physical Exam  Constitutional:       General: She is awake.   HENT:      Head: Normocephalic and atraumatic.      Mouth/Throat:      Mouth: Mucous membranes are moist.   Eyes:      General: Lids are normal.      Extraocular Movements: Extraocular movements intact.      Pupils: Pupils are equal, round, and reactive to light.   Cardiovascular:      Rate  and Rhythm: Normal rate and regular rhythm.      Pulses: Normal pulses.   Pulmonary:      Effort: Pulmonary effort is normal.      Comments: Breathing comfortable on room air  Abdominal:      Tenderness: There is no abdominal tenderness.   Musculoskeletal:         General: Normal range of motion.      Cervical back: Normal range of motion and neck supple.   Skin:     General: Skin is warm and dry.      Capillary Refill: Capillary refill takes less than 2 seconds.   Neurological:      Mental Status: She is alert.      Cranial Nerves: Cranial nerve deficit and dysarthria present.      Sensory: Sensory deficit present.      Motor: Motor strength is normal.   Psychiatric:      Comments: Anxious       Acute Stroke Data    Thrombolytic Inclusion / Exclusion Criteria    Time: 00:11 EDT  Person Administering Scale: KEISHA Sandra    Inclusion Criteria  [x]   18 years of age or greater   []   Onset of symptoms < 4.5 hours before beginning treatment (stroke onset = time patient was last seen well or without symptoms).   []   Diagnosis of acute ischemic stroke causing measurable disabling deficit (Complete Hemianopia, Any Aphasia, Visual or Sensory Extinction, Any weakness limiting sustained effort against gravity)   []   Any remaining deficit considered potentially disabling in view of patient and practitioner   Exclusion criteria (Do not proceed with Alteplase if any are checked under exclusion criteria)  [x]   Onset unknown or GREATER than 4.5 hours   []   ICH on CT/MRI   []   CT demonstrates hypodensity representing acute or subacute infarct   []   Significant head trauma or prior stroke in the previous 3 months   []   Symptoms suggestive of subarachnoid hemorrhage   []   History of un-ruptured intracranial aneurysm GREATER than 10 mm   []   Recent intracranial or intraspinal surgery within the last 3 months   []   Arterial puncture at a non-compressible site in the previous 7 days   []   Active internal  bleeding   []   Acute bleeding tendency   []   Platelet count LESS than 100,000 for known hematological diseases such as leukemia, thrombocytopenia or chronic cirrhosis   []   Current use of anticoagulant with INR GREATER than 1.7 or PT GREATER than 15 seconds, aPTT GREATER than 40 seconds   []   Heparin received within 48 hours, resulting in abnormally elevated aPTT GREATER than upper limit of normal   []   Current use of direct thrombin inhibitors or direct factor Xa inhibitors in the past 48 hours   []   Elevated blood pressure refractory to treatment (systolic GREATER than 185 mm/Hg or diastolic  GREATER than 110 mm/Hg   []   Suspected infective endocarditis and aortic arch dissection   []   Current use of therapeutic treatment dose of low-molecular-weight heparin (LMWH) within the previous 24 hours   []   Structural GI malignancy or bleed   Relative exclusion for all patients  []   Only minor non-disabling symptoms   []   Pregnancy   []   Seizure at onset with postictal residual neurological impairments   []   Major surgery or previous trauma within past 14 days   []   History of previous spontaneous ICH, intracranial neoplasm, or AV malformation   []   Postpartum (within previous 14 days)   []   Recent GI or urinary tract hemorrhage (within previous 21 days)   []   Recent acute MI (within previous 3 months)   []   History of un-ruptured intracranial aneurysm LESS than 10 mm   []   History of ruptured intracranial aneurysm   []   Blood glucose LESS than 50 mg/dL (2.7 mmol/L)   []   Dural puncture within the last 7 days   []   Known GREATER than 10 cerebral microbleeds   Additional exclusions for patients with symptoms onset between 3 and 4.5 hours.  []   Age > 80.   []   On any anticoagulants regardless of INR  >>> Warfarin (Coumadin), Heparin, Enoxaparin (Lovenox), fondaparinux (Arixtra), bivalirudin (Angiomax), Argatroban, dabigatran (Pradaxa), rivaroxaban (Xarelto), or apixaban (Eliquis)   []   Severe stroke  (NIHSS > 25).   []   History of BOTH diabetes and previous ischemic stroke.   []   The risks and benefits have been discussed with the patient or family related to the administration of IV thrombolytic therapy for stroke symptoms.   []   I have discussed and reviewed the patient's case and imaging with the attending prior to IV thrombolytic therapy.   NA Time IV thrombolytic administered       Hospital Meds:  Scheduled- labetalol, 5 mg, Intravenous, Once      Infusions-     PRNs-   sodium chloride    Functional Status Prior to Current Stroke/Orient Score: 0    NIH Stroke Scale  Time: 00:11 EDT  Person Administering Scale: KEISHA Sandra  Interval: baseline  1a. Level of Consciousness: 0-->Alert, keenly responsive  1b. LOC Questions: 0-->Answers both questions correctly  1c. LOC Commands: 0-->Performs both tasks correctly  2. Best Gaze: 0-->Normal  3. Visual: 1-->Partial hemianopia  4. Facial Palsy: 0-->Normal symmetrical movements  5a. Motor Arm, Left: 0-->No drift, limb holds 90 (or 45) degrees for full 10 secs  5b. Motor Arm, Right: 0-->No drift, limb holds 90 (or 45) degrees for full 10 secs  6a. Motor Leg, Left: 0-->No drift, leg holds 30 degree position for full 5 secs  6b. Motor Leg, Right: 0-->No drift, leg holds 30 degree position for full 5 secs  7. Limb Ataxia: 0-->Absent  8. Sensory: 0-->Normal, no sensory loss  9. Best Language: 2-->Severe aphasia, all communication is through fragmentary expression, great need for inference, questioning, and guessing by the listener. Range of information that can be exchanged is limited, listener carries burden of. . . (see row details)  10. Dysarthria: 2-->Severe dysarthria, patients speech is so slurred as to be unintelligible in the absence of or out of proportion to any dysphasia, or is mute/anarthric  11. Extinction and Inattention (formerly Neglect): 0-->No abnormality    Total (NIH Stroke Scale): 5      Results Reviewed:  I have personally reviewed  current lab, radiology, and data and agree with results.    Results for orders placed during the hospital encounter of 03/02/23    Adult Transthoracic Echo Complete W/ Cont if Necessary Per Protocol    Interpretation Summary    Left ventricular systolic function is normal. Left ventricular ejection fraction appears to be 56 - 60%.    Mild aortic valve regurgitation is present.    Mild tricuspid valve regurgitation is present.    Mild dilation of the ascending aorta is present at 4.2 cm.     CT Angiogram Neck    Result Date: 7/19/2023  CTA NECK: 1. No occlusion, significant stenosis, or evidence of dissection in the cervical carotid or vertebral arteries. 2. Atherosclerotic calcification at the carotid bifurcations and carotid bulbs. 3. Cervical spine degenerative changes as described above. CTA HEAD: 1. Moderate stenoses of the right MCA M1 segment and some of the M2 branches. 2. Mild ICA atherosclerosis. 3. No large vessel occlusion. CT PERFUSION: 1. Reduced blood vessel density in the right MCA territory, likely secondary to the stenoses described above. 2. Patchy areas of elevated Tmax in bilateral cerebral hemispheres as described above, likely artifactual or otherwise clinically insignificant. No suspicious perfusion abnormality is identified. If there remains high suspicion for acute ischemic infarct, MRI would be more sensitive. Electronically signed by:  Henrique Olae M.D.  7/18/2023 10:58 PM Mountain Time    CT Head Without Contrast Stroke Protocol    Result Date: 7/19/2023  1. No acute intracranial hemorrhage. Age indeterminate infarct right basal ganglia. MRI is more sensitive for the detection of acute nonhemorrhagic infarct. . 2. Mild changes small vessel ischemic disease of indeterminate age, presumably mostly chronic. Volume loss. Atherosclerosis. Report called to LAUREN PADILLA at 7/18/2023 9:55 PM Electronically signed by:  Ranjith Bo M.D.  7/18/2023 10:04 PM Mountain Time    CT Angiogram  Head w AI Analysis of LVO    Result Date: 7/19/2023  CTA NECK: 1. No occlusion, significant stenosis, or evidence of dissection in the cervical carotid or vertebral arteries. 2. Atherosclerotic calcification at the carotid bifurcations and carotid bulbs. 3. Cervical spine degenerative changes as described above. CTA HEAD: 1. Moderate stenoses of the right MCA M1 segment and some of the M2 branches. 2. Mild ICA atherosclerosis. 3. No large vessel occlusion. CT PERFUSION: 1. Reduced blood vessel density in the right MCA territory, likely secondary to the stenoses described above. 2. Patchy areas of elevated Tmax in bilateral cerebral hemispheres as described above, likely artifactual or otherwise clinically insignificant. No suspicious perfusion abnormality is identified. If there remains high suspicion for acute ischemic infarct, MRI would be more sensitive. Electronically signed by:  Henrique Olea M.D.  7/18/2023 10:58 PM Mountain Time    CT CEREBRAL PERFUSION WITH & WITHOUT CONTRAST    Result Date: 7/19/2023  CTA NECK: 1. No occlusion, significant stenosis, or evidence of dissection in the cervical carotid or vertebral arteries. 2. Atherosclerotic calcification at the carotid bifurcations and carotid bulbs. 3. Cervical spine degenerative changes as described above. CTA HEAD: 1. Moderate stenoses of the right MCA M1 segment and some of the M2 branches. 2. Mild ICA atherosclerosis. 3. No large vessel occlusion. CT PERFUSION: 1. Reduced blood vessel density in the right MCA territory, likely secondary to the stenoses described above. 2. Patchy areas of elevated Tmax in bilateral cerebral hemispheres as described above, likely artifactual or otherwise clinically insignificant. No suspicious perfusion abnormality is identified. If there remains high suspicion for acute ischemic infarct, MRI would be more sensitive. Electronically signed by:  Henrique Olea M.D.  7/18/2023 10:58 PM Mountain Time      Assessment/Plan:    This is a 78-year-old white female, right-handed with significant health diagnosis for hypertension, diabetes who was brought by family friend to the ED after she was noted for dysarthria confusion, expressive aphasia including the word salad.  Patient is not a candidate for TNK due to outside the window.      Antiplatelet PTA: Aspirin 81 mg  Anticoagulant PTA: None        Dysarthria/expressive aphasia  Confusion, headache  Age indeterminant right basal ganglia infarct    Ddx: TIA/ischemic stroke, neoplasm, migraine complex, seizure postictal, hypertensive emergency, hypertensive encephalopathy.    TIA/ischemic stroke without IV thrombolytic order set in place  Not candidate for TNK outside the window, not a candidate for thrombectomy due to no LVO.  CTH, CTP/CTA as above  MRI ordered and pending  Echo ordered and pending  Lipid profile ordered and Pending with LDL goal less than 70  A1c ordered and pending with goal less than 7  Diabetes education is needed  Stroke education is needed  Initiated DAPT, patient loaded with Plavix 600 mg once then will follow with 75 mg daily, full dose aspirin 325 mg once we will follow with 325 mg daily [id number].  -200   Neuro check\NIH per protocol  PT\OT\SLP evaluation  Case management for final discharge plan  Neurology stroke we will continue to follow-up    Expressive aphasia  Dysarthria  SLP evaluation    None intractable headache  Systolic blood pressure goal 1   Will recommend if headache continues patient can receive 2 g of IV mag  Managed by medicine team    Essential hypertension complicated by hypertensive emergency  Initial blood pressure 220/105 patient was mild confusion, slurred speech, expressive aphasia, and headache.  Patient received at the ED per ED physician 20 mg of labetalol.  Goal of blood pressure goal 1  per Dr. Villasenor recommendation.  Managed by medicine team    Diabetes type 2 created by hyperglycemia  Diabetes  education is needed  A1c goal less than 7  Managed by medicine team      Hypertensive encephalopathy  Patient mildly confused, complaining of headache  Blood pressure goal as above  Managed by medicine team        I discussed plan of care with patient, family friend, ED physician, , neurology stroke we will continue to follow-up.  Thank you for letting us participate in patient care.            KEISHA Sandra  July 19, 2023  00:11 EDT

## 2023-07-19 NOTE — THERAPY EVALUATION
Acute Care - Speech Language Pathology   Initial Evaluation  [facility]  Cognitive-Communication Evaluation       Patient Name: Dianne Barry  : 1945  MRN: [id number]  Today's Date: 2023               Admit Date: 2023     Visit Dx:    ICD-10-CM ICD-9-CM   1. Expressive aphasia  R47.01 784.3   2. Dysarthria  R47.1 784.51   3. Nonintractable headache, unspecified chronicity pattern, unspecified headache type  R51.9 784.0   4. Poorly-controlled hypertension  I10 401.9   5. Nodule of lower lobe of right lung  R91.1 793.11   6. Cognitive communication deficit  R41.841 799.52     Patient Active Problem List   Diagnosis    Severe sepsis    UTI (urinary tract infection)    Diabetes mellitus    Elevated troponin    Chronic diastolic congestive heart failure    Coronary artery disease involving native coronary artery of native heart without angina pectoris    Dysarthria     Past Medical History:   Diagnosis Date    Arthritis     Bacterial UTI 2017    HOSPITALIZED FOR SEPSIS    Cancer     Cataracts, bilateral     Chronic diastolic congestive heart failure 2018    Coronary artery disease involving native coronary artery of native heart without angina pectoris 2018    Diabetes mellitus     Gall stones     Hip fracture, right     History of transfusion     Hypertension      Past Surgical History:   Procedure Laterality Date    ABDOMINAL SURGERY      CARDIAC CATHETERIZATION N/A 10/17/2017    Procedure: Left Heart Cath;  Surgeon: Oral Velazco MD;  Location: [facility] INVASIVE LOCATION;  Service:     CATARACT EXTRACTION       SECTION      COLON SURGERY      COLONOSCOPY      CYSTOSCOPY      EYE SURGERY      FRACTURE SURGERY      HYSTERECTOMY      MULTIPLE TOOTH EXTRACTIONS      OOPHORECTOMY      TUBAL ABDOMINAL LIGATION         SLP Recommendation and Plan  SLP Diagnosis: mild, cognitive-linguistic disorder (23 1350)           SLC Criteria for Skilled Therapy Interventions Met: yes  (07/19/23 1350)  Anticipated Discharge Disposition (SLP): home with assist (07/19/23 1350)        Predicted Duration Therapy Intervention (Days): until discharge (07/19/23 1350)                                    SLP EVALUATION (last 72 hours)       SLP SLC Evaluation       Row Name 07/19/23 1350                   Communication Assessment/Intervention    Document Type evaluation  -        Subjective Information no complaints  -        Patient Observations alert;cooperative;agree to therapy  -        Patient/Family/Caregiver Comments/Observations family present  -        Patient Effort good  -        Symptoms Noted During/After Treatment none  -           General Information    Patient Profile Reviewed yes  -CH        Pertinent History Of Current Problem admitted with AMS, dysarthria, aphasia, headache. MRI: no acute findings  -        Precautions/Limitations, Vision WFL;for purposes of eval  -        Precautions/Limitations, Hearing WFL;for purposes of eval  -        Prior Level of Function-Communication WFL  -        Plans/Goals Discussed with patient and family;agreed upon  -        Barriers to Rehab none identified  -        Patient's Goals for Discharge return to home;return to all previous roles/activities  -        Family Goals for Discharge family did not state  -           Pain    Additional Documentation Pain Scale: FACES Pre/Post-Treatment (Group)  -           Pain Scale: FACES Pre/Post-Treatment    Pain: FACES Scale, Pretreatment 0-->no hurt  -        Posttreatment Pain Rating 0-->no hurt  -           Comprehension Assessment/Intervention    Comprehension Assessment/Intervention Auditory Comprehension  -           Auditory Comprehension Assessment/Intervention    Auditory Comprehension (Communication) mild impairment  -        Answers Questions (Communication) yes/no;wh questions;personal;simple;concrete;WFL;abstract;mild impairment  -        Able to Follow Commands  (Communication) 2-step;[facility]  -           Expression Assessment/Intervention    Expression Assessment/Intervention verbal expression  -           Verbal Expression Assessment/Intervention    Verbal Expression [facility]  -        Automatic Speech (Communication) response to greeting;counting 1-20;WNL  -        Repetition phrases;[facility]  -        Responsive Naming simple;WNL  -        Confrontational Naming low frequency;WNL  -        Spontaneous/Functional Words WNL  -        Sentence Formulation simple;complex;WNL  -        Conversational Discourse/Fluency WF  -           Oral Motor Structure and Function    Oral Motor Structure and Function WNL  -           Oral Musculature and Cranial Nerve Assessment    Oral Motor General Assessment WFL  -           Motor Speech Assessment/Intervention    Motor Speech Function WNL  -        Motor Speech, Comment Baseline per [facility]  -           Cursory Voice Assessment/Intervention    Quality and Resonance (Voice) WNL  -           Cognitive Assessment Intervention- SLP    Cognitive Function (Cognition) mild impairment  -        Orientation Status (Cognition) awareness of basic personal information;person;place;time;situation;[facility]  -        Memory (Cognitive) simple;functional;immediate;short-term;long-term;[facility]  -        Attention (Cognitive) selective;sustained;L  -        Thought Organization (Cognitive) concrete divergent;abstract divergent;mental manipulation;mild impairment  -        Reasoning (Cognitive) simple;deductive;L  -        Problem Solving (Cognitive) simple;temporal;mild impairment  -        Functional Math (Cognitive) simple;word problems;mild impairment  -        Pragmatics (Communication) WNL  -        Right Hemisphere Function WN  -           SLP Evaluation Clinical Impressions    SLP Diagnosis mild;cognitive-linguistic disorder  -CH        Rehab Potential/Prognosis good  -CH        [facility]  Interventions Met yes  -        Functional Impact functional impact in social situations;difficulty completing home management task  -           Recommendations    Therapy Frequency (SLP SLC) 5 days per week  -        Predicted Duration Therapy Intervention (Days) until discharge  -        Anticipated Discharge Disposition (SLP) home with assist  -                  User Key  (r) = Recorded By, (t) = Taken By, (c) = Cosigned By      Initials Name Effective Dates    CH Mendoza Lizzy, MS CCC-SLP 06/16/21 -                        EDUCATION  The patient has been educated in the following areas:     Cognitive Impairment Communication Impairment.           SLP GOALS       Row Name 07/19/23 2510             Patient will demonstrate functional cognitive-linguistic skills for return to discharge environment    [place] with minimal cues  -      Time frame by discharge  -CH         Follow Directions Goal 2 (SLP)    Improve Ability to Follow Directions Goal 1 (SLP) 3 step commands;multistep commands;80%;with minimal cues (75-90%)  -      Time Frame (Follow Directions Goal 1, SLP) by discharge  -CH         Organizational Skills Goal 1 (SLP)    Improve Thought Organization Through Goal 1 (SLP) completing a divergent naming task;completing mental manipulation task;80%;with minimal cues (75-90%)  -      Time Frame (Thought Organization Skills Goal 1, SLP) by discharge  -CH         Functional Problem Solving Skills Goal 1 (SLP)    Improve Problem Solving Through Goal 1 (SLP) answer questions about ADL problems;determine multiple solutions to problems;80%;with minimal cues (75-90%)  -      Time Frame (Problem Solving Goal 1, SLP) by discharge  -CH         Functional Math Skills Goal 1 (SLP)    Improve Functional Math Skills Through Goal 1 (SLP) complete simple math problems;complete word problems involving time;complete word problems involving money;80%;with minimal cues (75-90%)  -      Time Frame  (Functional Math Skills Goal 1, SLP) by discharge  -                User Key  (r) = Recorded By, (t) = Taken By, (c) = Cosigned By      Initials Name Provider Type    Lizzy Miranda MS CCC-SLP Speech and Language Pathologist                            Time Calculation:      Time Calculation- SLP       Row Name 07/19/23 1559             Time Calculation- SLP    SLP Start Time 1350  -CH      SLP Received On 07/19/23  -         Untimed Charges    SLP Eval/Re-eval  ST Eval Speech and Production w/ Language - 52570  -CH      02875-FH Eval Speech and Production w/ Language Minutes 45  -CH         Total Minutes    Untimed Charges Total Minutes 45  -CH       Total Minutes 45  -CH                User Key  (r) = Recorded By, (t) = Taken By, (c) = Cosigned By      Initials Name Provider Type    Lizzy Miranda MS CCC-SLP Speech and Language Pathologist                    Therapy Charges for Today       Code Description Service Date Service Provider Modifiers Qty    [id number] HC ST EVAL SPEECH AND PROD W LANG  3 7/19/2023 Lizzy Mendoza MS CCC-SLP GN 1                       Lizzy Mendoza MS CCC-GEOR[place]IE  7/19/2023

## 2023-07-19 NOTE — CONSULTS
Reviewed chart for diabetes education consult. Noted history of diabetes.  Noted A1c of 7.6%. Noted being evaluated for stroke.  Will follow for diabetes education.  Thank you for this referral.

## 2023-07-19 NOTE — PROGRESS NOTES
Neurology Note    Patient:  Dianne Barry    YOB: 1945    REFERRING PHYSICIAN:  Dr. Diane    CHIEF COMPLAINT:    Difficulty with speech    HISTORY OF PRESENT ILLNESS:   The patient reports feeling mostly better, still stumbling over words some. BP improved, 220/105 last night, last 104/57, Cardene stopped at 1200. She had a headache last night. Reports a prior h/o episodes of right hand numbness and difficulty with speech, had an unremarkable w/u in March per Dr. Dotson, MRI brain with chronic small vessel changes. She denies a definite h/o migraine.    Past Medical History:  Past Medical History:   Diagnosis Date    Arthritis     Bacterial UTI 2017    HOSPITALIZED FOR SEPSIS    Cancer     Cataracts, bilateral     Chronic diastolic congestive heart failure 2018    Coronary artery disease involving native coronary artery of native heart without angina pectoris 2018    Diabetes mellitus     Gall stones     Hip fracture, right     History of transfusion     Hypertension        Past Surgical History:  Past Surgical History:   Procedure Laterality Date    ABDOMINAL SURGERY      CARDIAC CATHETERIZATION N/A 10/17/2017    Procedure: Left Heart Cath;  Surgeon: Oral Velazco MD;  Location: [facility] CATH INVASIVE LOCATION;  Service:     CATARACT EXTRACTION       SECTION      COLON SURGERY      COLONOSCOPY      CYSTOSCOPY      EYE SURGERY      FRACTURE SURGERY      HYSTERECTOMY      MULTIPLE TOOTH EXTRACTIONS      OOPHORECTOMY      TUBAL ABDOMINAL LIGATION         Social History:   Social History     Socioeconomic History    Marital status:    Tobacco Use    Smoking status: Former     Types: Cigarettes     Passive exposure: Never    Smokeless tobacco: Never   Vaping Use    Vaping Use: Never used   Substance and Sexual Activity    Alcohol use: Not Currently     Alcohol/week: 1.0 standard drink     Types: 1 Drinks containing 0.5 oz of alcohol per week     Comment: 4 times per month, 1  drink    Drug use: No    Sexual activity: Defer        Family History:   Family History   Problem Relation Age of Onset    Heart disease Mother     Hyperlipidemia Mother     Hypertension Mother     Diabetes Mother     Heart attack Mother     Heart attack Father     Depression Other     Breast cancer Neg Hx     Ovarian cancer Neg Hx        Medications Prior to Admission:    Prior to Admission medications    Medication Sig Start Date End Date Taking? Authorizing Provider   aspirin 81 MG EC tablet Take 1 tablet by mouth Every Morning. OTC   Yes Kimmy Jang MD   Biotin 5 MG tablet Take 1 tablet by mouth Daily. OTC   Yes Kimmy Jang MD   Cinnamon 500 MG tablet Take 2 capsules by mouth Daily. OTC   Yes Kimmy Jang MD   estradiol (ESTRACE) 1 MG tablet Take 1 tablet by mouth Daily. 7/26/21  Yes Kimmy Jang MD   glimepiride (AMARYL) 1 MG tablet Take 1 tablet by mouth Daily. 6/17/21  Yes Kimmy Jang MD   meclizine 25 MG chewable tablet chewable tablet Chew 1 tablet As Needed. OTC   Yes Kimmy Jang MD   meloxicam (MOBIC) 15 MG tablet Take 1 tablet by mouth Daily.   Yes Kimmy Jang MD   metoprolol tartrate (LOPRESSOR) 50 MG tablet Take 0.5 tablets by mouth Every 12 (Twelve) Hours.  Patient taking differently: Take 1 tablet by mouth 2 (Two) Times a Day. 3/16/22  Yes Artem Barber PA   Omega-3 Fatty Acids (FISH OIL) 1000 MG capsule capsule Take 2 capsules by mouth Daily With Breakfast. OTC   Yes Kimmy Jang MD   rosuvastatin (CRESTOR) 5 MG tablet Take 1 tablet by mouth Every Other Day. At night 1/8/18  Yes Kimmy Jang MD   Testosterone Propionate (FIRST-TESTOSTERONE) 2 % ointment Place 1 application on the skin as directed by provider Every Morning. PLACES ON INNER THIGH   Yes Kimmy Jang MD   valsartan (DIOVAN) 80 MG tablet Take 1 tablet by mouth Daily. NEEDS APPOINTMENT FOR FUTURE REFILLS 6/14/23  Yes Artem Barber PA    clopidogrel (PLAVIX) 75 MG tablet Take 1 tablet by mouth Daily.    Provider, MD Kimmy   pantoprazole (PROTONIX) 20 MG EC tablet Take 1 tablet by mouth 2 (Two) Times a Day.    Provider, MD Kimmy   mupirocin (BACTROBAN) 2 % ointment 1 application  into the nostril(s) as directed by provider Daily. 7/30/21 7/19/23  ProviderKimmy MD       Allergies:  Darvon [propoxyphene] and Atorvastatin      Review of system  Review of Systems   Neurological:  Positive for speech difficulty.   All other systems reviewed and are negative.    Vitals:    07/19/23 1339   BP: 104/57   Pulse: 83   Resp: 16   Temp: 99 °F (37.2 °C)   SpO2: 94%       Physical exam  Physical Exam  HENT:      Head:      Comments: No temporal tenderness noted  Eyes:      Extraocular Movements: Extraocular movements intact.      Pupils: Pupils are equal, round, and reactive to light.   Cardiovascular:      Rate and Rhythm: Normal rate and regular rhythm.   Pulmonary:      Effort: Pulmonary effort is normal.   Neurological:      General: No focal deficit present.      Mental Status: She is oriented to person, place, and time.      Comments: Speech clear, able to name and repeat, VFF, no facial droop, no limb drift.         Lab Results   Component Value Date    WBC 8.19 07/19/2023    HGB 12.7 07/19/2023    HCT 38.0 07/19/2023    MCV 83.2 07/19/2023     07/19/2023     Lab Results   Component Value Date    GLUCOSE 274 (H) 07/19/2023    BUN 14 07/19/2023    CREATININE 0.72 07/19/2023    EGFRIFNONA 71 10/17/2017    BCR 19.4 07/19/2023    CO2 23.0 07/19/2023    CALCIUM 9.4 07/19/2023    ALBUMIN 4.3 07/19/2023    AST 16 07/19/2023    ALT 8 07/19/2023     ntains abnormal data Lipid Panel  Order: [id number]  Status: Final result       Visible to patient: Yes (not seen)       Next appt: 07/26/2023 at 11:20 AM in Radiology ([facility])    Specimen Information: Blood   0 Result Notes          Component  Ref Range & Units 07:01  (7/19/23) 5 yr  ago  (10/17/17) 5 yr ago  (9/29/17)   Total Cholesterol  0 - 200 mg/dL 169 210 High  152   Triglycerides  0 - 150 mg/dL 132 134 108   HDL Cholesterol  40 - 60 mg/dL 68 High  44 50   LDL Cholesterol  0 - 100 mg/dL 78 148 High  R 68 R   VLDL Cholesterol  5 - 40 mg/dL 23     LDL/HDL Ratio 1.10            Radiological Studies:   CT Angiogram Neck    Result Date: 7/19/2023  EXAMINATION: CT ANGIOGRAPHY HEAD AND NECK, CT PERFUSION DATE:  7/18/2023 INDICATION: Slurred speech. Aphasia. COMPARISON: Noncontrast CT head 7/18/2023 TECHNIQUE: CT angiography through the head and neck was performed in the axial plane using 115 mL of Isovue-370 administered intravenously, without adverse reaction. Coronal and sagittal MIP and MPR images were generated. CT perfusion imaging was performed and color maps were generated for cerebral blood flow, cerebral blood volume, mean transit time, time to drain, and Tmax. Rapid AI perfusion analysis was included.CT dose lowering techniques were used, to include: automated exposure control, adjustment for patient size, and or use of iterative reconstruction. Stenoses measured based on NASCET criteria. FINDINGS: CTA neck: Arch and great vessels: Ectasia and mild to moderate atherosclerotic calcification at the aortic arch. Great vessel origins are patent. Right carotid: The common carotid, internal carotid, and external carotid arteries are without occlusion, significant stenosis, or evidence of dissection. Atherosclerotic calcification at the carotid bifurcation and carotid bulb. Left carotid: The common carotid, internal carotid, and external carotid arteries are without occlusion, significant stenosis, or evidence of dissection. Atherosclerotic calcification at the carotid bifurcation and carotid bulb. Vertebral arteries: The vertebral arteries are uniform in caliber, with no occlusion, significant stenosis, or evidence of dissection. Osseous and soft tissue structures: Minimal cervical lordotic  reversal. Moderate disc height loss at C4-5. Severe disc height loss at C5-6 and C6-7. Multilevel facet and uncinate hypertrophy. Associated neural foraminal narrowing is most pronounced on the left at C4-5 and bilaterally at C5-6. CTA Head: Anterior Circulation: Mild atherosclerotic calcification of the internal carotid arteries. No occlusion or significant stenosis. Moderate stenosis at the right MCA M1 segment, with focal atherosclerotic calcification. Moderate stenoses in some of the right MCA M2 branches. No occlusion. Bilateral ACAs and left MCA are widely patent. Posterior Circulation: The vertebrobasilar system, superior cerebellar arteries, and posterior cerebral arteries are normal in caliber, with no occlusion, significant stenosis, or aneurysmal dilation. CT Perfusion: Blood vessel density: Recent blood vessel density in the right MCA territory (45-60% of normal). Qualitative color maps and column views: No focal deficit or significant perfusion asymmetry identified. Quantitative analysis: Volume of CBV < 34% 0 mL Volume of CBV < 38%: 0 mL Volume of CBV < 42%: 0 mL Volume of CBF< 20%: 0 mL Volume of CBF < 30%: 0 mL Volume of CBF < 34%: 0 mL Volume of CBF < 38%: 0 mL Volume of Tmax >10 seconds: 0 mL Volume of Tmax > 8 seconds: 0 mL Volume of Tmax > 6 seconds: 7 mL, involving bilateral anterior temporal lobes. Volume of Tmax > 4 seconds: 26 mL, involving bilateral temporal lobes, right greater than left cerebellar hemispheres, and patchy distribution in the periventricular white matter. This is probably artifact or otherwise clinically insignificant. Mismatch: Cerebral blood flow of less than 30% of normal and Tmax of greater than 6 seconds is used for calculation of the mismatch volume ratio. Calculated mismatch of 7 mL, but likely insignificant given the distribution described above.     CTA NECK: 1. No occlusion, significant stenosis, or evidence of dissection in the cervical carotid or vertebral  arteries. 2. Atherosclerotic calcification at the carotid bifurcations and carotid bulbs. 3. Cervical spine degenerative changes as described above. CTA HEAD: 1. Moderate stenoses of the right MCA M1 segment and some of the M2 branches. 2. Mild ICA atherosclerosis. 3. No large vessel occlusion. CT PERFUSION: 1. Reduced blood vessel density in the right MCA territory, likely secondary to the stenoses described above. 2. Patchy areas of elevated Tmax in bilateral cerebral hemispheres as described above, likely artifactual or otherwise clinically insignificant. No suspicious perfusion abnormality is identified. If there remains high suspicion for acute ischemic infarct, MRI would be more sensitive. Electronically signed by:  Henrique Olea M.D.  7/18/2023 10:58 PM Mountain Time    MRI Brain Without Contrast    Result Date: 7/19/2023  EXAMINATION: MRI BRAIN WO CONTRAST DATE: 7/19/2023 3:40 AM  HISTORY: Follow-up stroke COMPARISON: MRI of 3/6/2023. A and perfusion of 7/18/2023.  TECHNIQUE: Multiplanar, multisequence MR imaging of the brain was performed without intraveous contrast. FINDINGS: Image quality: Degraded by motion artifact  Ventricles/Extra-axial Spaces: Enlarged by global parenchymal volume loss. Parenchyma: Diffusion-weighted imaging shows no evidence for acute ischemia/infarction.   Patchy areas of T2 prolongation are present in the hemispheric white matter, statistically most likely from chronic small vessel ischemic changes. There are lacunar  infarcts and/or prominent perivascular spaces in the basal ganglia and thalami. No gross mass effect or midline shift.  No gross midline structural abnormalities are detected on sagittal imaging. Intracranial Hemorrhage: None detected. Bones/Extracranial soft tissues: There is normal marrow signal in the skull base.  Visualized Sinuses/Mastoids: Trace mastoid fluid, likely incidental. Vascular:  Arterial flow voids at the level of the skull base are within normal  limits.      1.  Motion artifact degraded exam. 2.  No acute intracranial process detected. 3.  Volume loss and mild chronic small vessel ischemic changes.            Electronically signed by:  Oral Boyce M.D.  7/19/2023 2:54 AM Mountain Time    XR Chest 1 View    Result Date: 7/19/2023  EXAM:  XR CHEST 1 VW   DATE: 7/19/2023 12:03 AM HISTORY:  Acute Stroke Protocol (onset < 12 hrs) COMPARISON:  9/30/2017, 9/28/2017 FINDINGS and     1.  Right lower lung bilobed nodular density 2.2 x 1.1 cm. May have been present previously, it is more conspicuous now. Consider calcifications. Recommend comparison with priors since 2017. If not available, consider CT chest without contrast within 2 weeks. 2.  Heart size is normal. 3.  No acute bony findings. Electronically signed by:  Lynda Agrawal M.D.  7/18/2023 11:26 PM Mountain Time    CT Head Without Contrast Stroke Protocol    Result Date: 7/19/2023  EXAMINATION: CT HEAD WITHOUT CONTRAST DATE: 7/18/2023 11:40 PM  INDICATION: STROKE ALERT, HEADACHE, APHASIA  COMPARISON: 2/21/23.  TECHNIQUE:  Routine axial images through the head without contrast. Coronal reformations. Low-dose CT acquisition technique included one or more of the following options: 1. Automated exposure control, 2. Adjustment of mA and/or KV according to patient's size and/or 3. Use of iterative reconstruction. FINDINGS:  Intracranial contents: New hypodensity right basal ganglia measuring 6 x 8 mm. Ventricular and cisternal spaces are prominent from volume loss. Mild periventricular and subcortical white matter hypodensities. No dominant mass, midline shift, hydrocephalus, extra axial fluid collection or acute hemorrhage. No asymmetric hyperdensity of the proximal major cerebral arteries. Craniocervical junction is patent. Bones and extracranial soft tissues: Mild mucosal thickening ethmoid air cells. Otherwise, Visualized paranasal sinuses and mastoid air cells are clear.  Skull is intact. Visualized  intraorbital contents are unremarkable.     1. No acute intracranial hemorrhage. Age indeterminate infarct right basal ganglia. MRI is more sensitive for the detection of acute nonhemorrhagic infarct. . 2. Mild changes small vessel ischemic disease of indeterminate age, presumably mostly chronic. Volume loss. Atherosclerosis. Report called to LAUREN PADILLA at 7/18/2023 9:55 PM Electronically signed by:  Ranjith Bo M.D.  7/18/2023 10:04 PM Mountain Time    XR Abdomen KUB    Result Date: 7/19/2023  EXAMINATION:   XR ABDOMEN KUB INDICATION:  Clearance for MRI COMPARISON: None available.     Right upper quadrant surgical clips are present. Otherwise no metallic foreign body identified. Contrast opacifies the bladder. Thank you for the referral of this patient. This exam was interpreted by an American Board of Radiology certified radiologist with subspecialty fellowship training in body imaging.   If there are any questions regarding this exam please feel free to contact a radiologist directly at [phone]. Electronically signed by:  Whit Walker M.D.  7/19/2023 1:21 AM Mountain Time    CT Angiogram Head w AI Analysis of LVO    Result Date: 7/19/2023  EXAMINATION: CT ANGIOGRAPHY HEAD AND NECK, CT PERFUSION DATE:  7/18/2023 INDICATION: Slurred speech. Aphasia. COMPARISON: Noncontrast CT head 7/18/2023 TECHNIQUE: CT angiography through the head and neck was performed in the axial plane using 115 mL of Isovue-370 administered intravenously, without adverse reaction. Coronal and sagittal MIP and MPR images were generated. CT perfusion imaging was performed and color maps were generated for cerebral blood flow, cerebral blood volume, mean transit time, time to drain, and Tmax. Rapid AI perfusion analysis was included.CT dose lowering techniques were used, to include: automated exposure control, adjustment for patient size, and or use of iterative reconstruction. Stenoses measured based on NASCET criteria.  FINDINGS: CTA neck: Arch and great vessels: Ectasia and mild to moderate atherosclerotic calcification at the aortic arch. Great vessel origins are patent. Right carotid: The common carotid, internal carotid, and external carotid arteries are without occlusion, significant stenosis, or evidence of dissection. Atherosclerotic calcification at the carotid bifurcation and carotid bulb. Left carotid: The common carotid, internal carotid, and external carotid arteries are without occlusion, significant stenosis, or evidence of dissection. Atherosclerotic calcification at the carotid bifurcation and carotid bulb. Vertebral arteries: The vertebral arteries are uniform in caliber, with no occlusion, significant stenosis, or evidence of dissection. Osseous and soft tissue structures: Minimal cervical lordotic reversal. Moderate disc height loss at C4-5. Severe disc height loss at C5-6 and C6-7. Multilevel facet and uncinate hypertrophy. Associated neural foraminal narrowing is most pronounced on the left at C4-5 and bilaterally at C5-6. CTA Head: Anterior Circulation: Mild atherosclerotic calcification of the internal carotid arteries. No occlusion or significant stenosis. Moderate stenosis at the right MCA M1 segment, with focal atherosclerotic calcification. Moderate stenoses in some of the right MCA M2 branches. No occlusion. Bilateral ACAs and left MCA are widely patent. Posterior Circulation: The vertebrobasilar system, superior cerebellar arteries, and posterior cerebral arteries are normal in caliber, with no occlusion, significant stenosis, or aneurysmal dilation. CT Perfusion: Blood vessel density: Recent blood vessel density in the right MCA territory (45-60% of normal). Qualitative color maps and column views: No focal deficit or significant perfusion asymmetry identified. Quantitative analysis: Volume of CBV < 34% 0 mL Volume of CBV < 38%: 0 mL Volume of CBV < 42%: 0 mL Volume of CBF< 20%: 0 mL Volume of CBF <  30%: 0 mL Volume of CBF < 34%: 0 mL Volume of CBF < 38%: 0 mL Volume of Tmax >10 seconds: 0 mL Volume of Tmax > 8 seconds: 0 mL Volume of Tmax > 6 seconds: 7 mL, involving bilateral anterior temporal lobes. Volume of Tmax > 4 seconds: 26 mL, involving bilateral temporal lobes, right greater than left cerebellar hemispheres, and patchy distribution in the periventricular white matter. This is probably artifact or otherwise clinically insignificant. Mismatch: Cerebral blood flow of less than 30% of normal and Tmax of greater than 6 seconds is used for calculation of the mismatch volume ratio. Calculated mismatch of 7 mL, but likely insignificant given the distribution described above.     CTA NECK: 1. No occlusion, significant stenosis, or evidence of dissection in the cervical carotid or vertebral arteries. 2. Atherosclerotic calcification at the carotid bifurcations and carotid bulbs. 3. Cervical spine degenerative changes as described above. CTA HEAD: 1. Moderate stenoses of the right MCA M1 segment and some of the M2 branches. 2. Mild ICA atherosclerosis. 3. No large vessel occlusion. CT PERFUSION: 1. Reduced blood vessel density in the right MCA territory, likely secondary to the stenoses described above. 2. Patchy areas of elevated Tmax in bilateral cerebral hemispheres as described above, likely artifactual or otherwise clinically insignificant. No suspicious perfusion abnormality is identified. If there remains high suspicion for acute ischemic infarct, MRI would be more sensitive. Electronically signed by:  Henrique Olea M.D.  7/18/2023 10:58 PM Mountain Time    CT CEREBRAL PERFUSION WITH & WITHOUT CONTRAST    Result Date: 7/19/2023  EXAMINATION: CT ANGIOGRAPHY HEAD AND NECK, CT PERFUSION DATE:  7/18/2023 INDICATION: Slurred speech. Aphasia. COMPARISON: Noncontrast CT head 7/18/2023 TECHNIQUE: CT angiography through the head and neck was performed in the axial plane using 115 mL of Isovue-370 administered  intravenously, without adverse reaction. Coronal and sagittal MIP and MPR images were generated. CT perfusion imaging was performed and color maps were generated for cerebral blood flow, cerebral blood volume, mean transit time, time to drain, and Tmax. Rapid AI perfusion analysis was included.CT dose lowering techniques were used, to include: automated exposure control, adjustment for patient size, and or use of iterative reconstruction. Stenoses measured based on NASCET criteria. FINDINGS: CTA neck: Arch and great vessels: Ectasia and mild to moderate atherosclerotic calcification at the aortic arch. Great vessel origins are patent. Right carotid: The common carotid, internal carotid, and external carotid arteries are without occlusion, significant stenosis, or evidence of dissection. Atherosclerotic calcification at the carotid bifurcation and carotid bulb. Left carotid: The common carotid, internal carotid, and external carotid arteries are without occlusion, significant stenosis, or evidence of dissection. Atherosclerotic calcification at the carotid bifurcation and carotid bulb. Vertebral arteries: The vertebral arteries are uniform in caliber, with no occlusion, significant stenosis, or evidence of dissection. Osseous and soft tissue structures: Minimal cervical lordotic reversal. Moderate disc height loss at C4-5. Severe disc height loss at C5-6 and C6-7. Multilevel facet and uncinate hypertrophy. Associated neural foraminal narrowing is most pronounced on the left at C4-5 and bilaterally at C5-6. CTA Head: Anterior Circulation: Mild atherosclerotic calcification of the internal carotid arteries. No occlusion or significant stenosis. Moderate stenosis at the right MCA M1 segment, with focal atherosclerotic calcification. Moderate stenoses in some of the right MCA M2 branches. No occlusion. Bilateral ACAs and left MCA are widely patent. Posterior Circulation: The vertebrobasilar system, superior cerebellar  arteries, and posterior cerebral arteries are normal in caliber, with no occlusion, significant stenosis, or aneurysmal dilation. CT Perfusion: Blood vessel density: Recent blood vessel density in the right MCA territory (45-60% of normal). Qualitative color maps and column views: No focal deficit or significant perfusion asymmetry identified. Quantitative analysis: Volume of CBV < 34% 0 mL Volume of CBV < 38%: 0 mL Volume of CBV < 42%: 0 mL Volume of CBF< 20%: 0 mL Volume of CBF < 30%: 0 mL Volume of CBF < 34%: 0 mL Volume of CBF < 38%: 0 mL Volume of Tmax >10 seconds: 0 mL Volume of Tmax > 8 seconds: 0 mL Volume of Tmax > 6 seconds: 7 mL, involving bilateral anterior temporal lobes. Volume of Tmax > 4 seconds: 26 mL, involving bilateral temporal lobes, right greater than left cerebellar hemispheres, and patchy distribution in the periventricular white matter. This is probably artifact or otherwise clinically insignificant. Mismatch: Cerebral blood flow of less than 30% of normal and Tmax of greater than 6 seconds is used for calculation of the mismatch volume ratio. Calculated mismatch of 7 mL, but likely insignificant given the distribution described above.     CTA NECK: 1. No occlusion, significant stenosis, or evidence of dissection in the cervical carotid or vertebral arteries. 2. Atherosclerotic calcification at the carotid bifurcations and carotid bulbs. 3. Cervical spine degenerative changes as described above. CTA HEAD: 1. Moderate stenoses of the right MCA M1 segment and some of the M2 branches. 2. Mild ICA atherosclerosis. 3. No large vessel occlusion. CT PERFUSION: 1. Reduced blood vessel density in the right MCA territory, likely secondary to the stenoses described above. 2. Patchy areas of elevated Tmax in bilateral cerebral hemispheres as described above, likely artifactual or otherwise clinically insignificant. No suspicious perfusion abnormality is identified. If there remains high suspicion for  acute ischemic infarct, MRI would be more sensitive. Electronically signed by:  Henrique Olea M.D.  7/18/2023 10:58 PM Mountain Time       During this visit the following were done:  Labs Reviewed [x]    Labs Ordered []    Radiology Reports Reviewed [x]    Radiology Ordered []    EKG, echo, and/or stress test reviewed [x]    EEG results reviewed  []    EEG reviewed and interpreted per myself   []    Discussed case with neurointerventionalist or neuroradiologist []    Referring Provider Records Reviewed []    ER Records Reviewed []    Hospital Records Reviewed []    History Obtained From Family []    Radiological images view and Interpreted per myself [x]    Case Discussed with referring provider []     Decision to obtain and request outside records  []        Assessment and Plan     Stroke-like symptoms, resolved. HTN urgency. Symptoms related to HTN urgency vs TIA vs partial seizure vs migraine aura. Reports having a headache yday. Prior reported brief episodes of right hand paresthesia and aphasia. MRI with chronic small vessel changes. CTA H/N w/o LVO or significant stenosis to my eye, mild bilateral carotid plaque and right MCA irregularity, personally reviewed. TTE unremarkable in March, bubble study not done.   - Observation on telemetry.   - Normal BP goal <130/80.   - EEG.   - TCD bubble study.   - Holter on discharge.   - DAPT for 3 weeks, then Plavix alone.   - High dose statin, LDL goal<70.   - ST, PT, OT.              Electronically signed by Suman Villasenor MD on 7/19/2023 at 15:21 EDT

## 2023-07-19 NOTE — PLAN OF CARE
Goal Outcome Evaluation:  Plan of Care Reviewed With: patient     SLP evaluation completed. Will address cognitive communication. Please see note for further details and recommendations.

## 2023-07-19 NOTE — CASE MANAGEMENT/SOCIAL WORK
Discharge Planning Assessment  [facility]     Patient Name: Dianne Barry  MRN: [id number]  Today's Date: 7/19/2023    Admit Date: 7/18/2023    Plan: Home   Discharge Needs Assessment       Row Name 07/19/23 1114       Living Environment    People in Home alone    Current Living Arrangements home    Primary Care Provided by self    Provides Primary Care For no one    Family Caregiver if Needed child(brian), adult    Family Caregiver Names two adult daughters live nearby    Quality of Family Relationships helpful;involved;supportive    Able to Return to Prior Arrangements yes       Transition Planning    Patient/Family Anticipates Transition to home;home with help/services    Patient/Family Anticipated Services at Transition        Discharge Needs Assessment    Equipment Currently Used at Home none    Concerns to be Addressed denies needs/concerns at this time    Equipment Needed After Discharge none    Discharge Coordination/Progress Lives in [place] alone, can call on daughters that live nearby.  Does not use any DME and does not have any advanced directives.  Has had home health, unsure of name of company.                   Discharge Plan       Row Name 07/19/23 1116       Plan    Plan Home    Patient/Family in Agreement with Plan yes    Plan Comments I spoke with Ms Barry and her daughters at bedside.  Ms Barry resides in a house in [place] alone, however can call on her daughters if needed.  She does not use any DME and does not have any advanced directives.  She has had home health, however she did not remember the name of the company.  Her PCP is Daron Dotson, and she gets her prescriptions filled at [facility] in [place]. At this time, there are no discharge needs.    Final Discharge Disposition Code 01 - home or self-care                  Continued Care and Services - Admitted Since 7/18/2023    Coordination has not been started for this encounter.       Expected  Discharge Date and Time       Expected Discharge Date Expected Discharge Time    Jul 21, 2023            Demographic Summary    No documentation.                  Functional Status       Row Name 07/19/23 1113       Functional Status    Usual Activity Tolerance good    Current Activity Tolerance good       Functional Status, IADL    Medications independent    Meal Preparation independent    Housekeeping independent    Laundry independent    Shopping independent       Mental Status    General Appearance WDL WDL                   Psychosocial    No documentation.                  Abuse/Neglect    No documentation.                  Legal    No documentation.                  Substance Abuse    No documentation.                  Patient Forms    No documentation.                     Jessica Gold RN

## 2023-07-19 NOTE — PROGRESS NOTES
[facility] Medicine Services  ADMISSION FOLLOW-UP NOTE          Patient admitted after midnight, H&P by my partner performed earlier on today's date reviewed.  Interim findings, labs, and charting also reviewed.        The [facility] Hospital Problem List has been managed and updated to include any new diagnoses:  Active Hospital Problems    Diagnosis  POA    **Dysarthria [R47.1]  Yes      Resolved Hospital Problems   No resolved problems to display.     78-year-old female with history of hypertension, type 2 diabetes hyperlipidemia, history of TIA who presented with dysarthria and mental status changes admitted for CVA rule out.  This morning she remains dysarthric, having word finding difficulties and still with some confusion.  BP is significantly better    Suspected CVA vs hypertensive emergency  Acute encephalopathy, ?  Hypertensive  History of prior TIA  -Patient with dysarthria, expressive aphasia  -CTA head does show moderate stenosis of the right MCA M1 segment, CT perfusion with no significant perfusion asymmetry, CT head is age-indeterminate and right basal ganglia infarct.  MRI is motion degraded but no infarct seen  -Stroke neurology following, work-up per protocol, follow-up echo  -Continue DAPT, statin  -Follow-up UA r/o UTI  -PT/OT/SLP to evaluate    Hypertensive emergency  -BP significantly elevated on presentation, likely contributory to above  -Continue BP control, SBP goal of 140-200 per neurology  -Patient restarted on home valsartan and metoprolol however SBP in the 110s, will DC this for now    Well-controlled type 2 diabetes A1c 6.9%  -Continue SSI for now    Hyperlipidemia    Otherwise continue plan of care per admission H&P    Expected Discharge   Expected Discharge Date: 7/21/2023; Expected Discharge Time:      Teresita Diane MD  07/19/23

## 2023-07-20 ENCOUNTER — APPOINTMENT (OUTPATIENT)
Dept: NEUROLOGY | Facility: HOSPITAL | Age: 78
End: 2023-07-20
Payer: MEDICARE

## 2023-07-20 ENCOUNTER — APPOINTMENT (OUTPATIENT)
Dept: CARDIOLOGY | Facility: HOSPITAL | Age: 78
End: 2023-07-20
Payer: MEDICARE

## 2023-07-20 LAB
BACTERIA UR QL AUTO: ABNORMAL /HPF
BACTERIA UR QL AUTO: ABNORMAL /HPF
BILIRUB UR QL STRIP: NEGATIVE
BILIRUB UR QL STRIP: NEGATIVE
CLARITY UR: ABNORMAL
CLARITY UR: ABNORMAL
COLOR UR: YELLOW
COLOR UR: YELLOW
GLUCOSE BLDC GLUCOMTR-MCNC: 119 MG/DL (ref 70–130)
GLUCOSE BLDC GLUCOMTR-MCNC: 131 MG/DL (ref 70–130)
GLUCOSE BLDC GLUCOMTR-MCNC: 191 MG/DL (ref 70–130)
GLUCOSE BLDC GLUCOMTR-MCNC: 210 MG/DL (ref 70–130)
GLUCOSE BLDC GLUCOMTR-MCNC: 233 MG/DL (ref 70–130)
GLUCOSE BLDC GLUCOMTR-MCNC: 250 MG/DL (ref 70–130)
GLUCOSE UR STRIP-MCNC: ABNORMAL MG/DL
GLUCOSE UR STRIP-MCNC: NEGATIVE MG/DL
HGB UR QL STRIP.AUTO: NEGATIVE
HGB UR QL STRIP.AUTO: NEGATIVE
HYALINE CASTS UR QL AUTO: ABNORMAL /LPF
HYALINE CASTS UR QL AUTO: ABNORMAL /LPF
KETONES UR QL STRIP: NEGATIVE
KETONES UR QL STRIP: NEGATIVE
LEUKOCYTE ESTERASE UR QL STRIP.AUTO: ABNORMAL
LEUKOCYTE ESTERASE UR QL STRIP.AUTO: ABNORMAL
NITRITE UR QL STRIP: POSITIVE
NITRITE UR QL STRIP: POSITIVE
PA ADP PRP-ACNC: 124 PRU
PH UR STRIP.AUTO: 5.5 [PH] (ref 5–8)
PH UR STRIP.AUTO: 5.5 [PH] (ref 5–8)
PROT UR QL STRIP: ABNORMAL
PROT UR QL STRIP: NEGATIVE
RBC # UR STRIP: ABNORMAL /HPF
RBC # UR STRIP: ABNORMAL /HPF
REF LAB TEST METHOD: ABNORMAL
REF LAB TEST METHOD: ABNORMAL
SP GR UR STRIP: 1.02 (ref 1–1.03)
SP GR UR STRIP: 1.02 (ref 1–1.03)
SQUAMOUS #/AREA URNS HPF: ABNORMAL /HPF
SQUAMOUS #/AREA URNS HPF: ABNORMAL /HPF
UROBILINOGEN UR QL STRIP: ABNORMAL
UROBILINOGEN UR QL STRIP: ABNORMAL
WBC # UR STRIP: ABNORMAL /HPF
WBC # UR STRIP: ABNORMAL /HPF

## 2023-07-20 PROCEDURE — 63710000001 INSULIN REGULAR HUMAN PER 5 UNITS: Performed by: INTERNAL MEDICINE

## 2023-07-20 PROCEDURE — 81001 URINALYSIS AUTO W/SCOPE: CPT | Performed by: INTERNAL MEDICINE

## 2023-07-20 PROCEDURE — 87077 CULTURE AEROBIC IDENTIFY: CPT | Performed by: INTERNAL MEDICINE

## 2023-07-20 PROCEDURE — 93893 TCD STD ICR ART VEN-ART SHNT: CPT | Performed by: STUDENT IN AN ORGANIZED HEALTH CARE EDUCATION/TRAINING PROGRAM

## 2023-07-20 PROCEDURE — 82948 REAGENT STRIP/BLOOD GLUCOSE: CPT

## 2023-07-20 PROCEDURE — 95816 EEG AWAKE AND DROWSY: CPT | Performed by: PSYCHIATRY & NEUROLOGY

## 2023-07-20 PROCEDURE — G0378 HOSPITAL OBSERVATION PER HR: HCPCS

## 2023-07-20 PROCEDURE — 81001 URINALYSIS AUTO W/SCOPE: CPT | Performed by: NURSE PRACTITIONER

## 2023-07-20 PROCEDURE — 85576 BLOOD PLATELET AGGREGATION: CPT

## 2023-07-20 PROCEDURE — 97165 OT EVAL LOW COMPLEX 30 MIN: CPT

## 2023-07-20 PROCEDURE — 97161 PT EVAL LOW COMPLEX 20 MIN: CPT

## 2023-07-20 PROCEDURE — 99232 SBSQ HOSP IP/OBS MODERATE 35: CPT | Performed by: NURSE PRACTITIONER

## 2023-07-20 PROCEDURE — 96367 TX/PROPH/DG ADDL SEQ IV INF: CPT

## 2023-07-20 PROCEDURE — 93893 TCD STD ICR ART VEN-ART SHNT: CPT

## 2023-07-20 PROCEDURE — 25010000002 CEFTRIAXONE PER 250 MG: Performed by: NURSE PRACTITIONER

## 2023-07-20 PROCEDURE — 99214 OFFICE O/P EST MOD 30 MIN: CPT

## 2023-07-20 PROCEDURE — 95816 EEG AWAKE AND DROWSY: CPT

## 2023-07-20 PROCEDURE — 87086 URINE CULTURE/COLONY COUNT: CPT | Performed by: INTERNAL MEDICINE

## 2023-07-20 PROCEDURE — P9612 CATHETERIZE FOR URINE SPEC: HCPCS

## 2023-07-20 PROCEDURE — 63710000001 INSULIN LISPRO (HUMAN) PER 5 UNITS: Performed by: INTERNAL MEDICINE

## 2023-07-20 PROCEDURE — 87186 SC STD MICRODIL/AGAR DIL: CPT | Performed by: INTERNAL MEDICINE

## 2023-07-20 RX ORDER — VALSARTAN 80 MG/1
80 TABLET ORAL
Status: DISCONTINUED | OUTPATIENT
Start: 2023-07-20 | End: 2023-07-21 | Stop reason: HOSPADM

## 2023-07-20 RX ORDER — IBUPROFEN 600 MG/1
1 TABLET ORAL
Status: DISCONTINUED | OUTPATIENT
Start: 2023-07-20 | End: 2023-07-21 | Stop reason: HOSPADM

## 2023-07-20 RX ORDER — ROSUVASTATIN CALCIUM 20 MG/1
20 TABLET, COATED ORAL NIGHTLY
Status: DISCONTINUED | OUTPATIENT
Start: 2023-07-20 | End: 2023-07-21 | Stop reason: HOSPADM

## 2023-07-20 RX ORDER — DEXTROSE MONOHYDRATE 25 G/50ML
25 INJECTION, SOLUTION INTRAVENOUS
Status: DISCONTINUED | OUTPATIENT
Start: 2023-07-20 | End: 2023-07-21 | Stop reason: HOSPADM

## 2023-07-20 RX ORDER — INSULIN LISPRO 100 [IU]/ML
2-9 INJECTION, SOLUTION INTRAVENOUS; SUBCUTANEOUS
Status: DISCONTINUED | OUTPATIENT
Start: 2023-07-20 | End: 2023-07-21 | Stop reason: HOSPADM

## 2023-07-20 RX ORDER — NICOTINE POLACRILEX 4 MG
15 LOZENGE BUCCAL
Status: DISCONTINUED | OUTPATIENT
Start: 2023-07-20 | End: 2023-07-21 | Stop reason: HOSPADM

## 2023-07-20 RX ADMIN — INSULIN LISPRO 4 UNITS: 100 INJECTION, SOLUTION INTRAVENOUS; SUBCUTANEOUS at 17:24

## 2023-07-20 RX ADMIN — ROSUVASTATIN 20 MG: 20 TABLET, FILM COATED ORAL at 20:56

## 2023-07-20 RX ADMIN — VALSARTAN 80 MG: 80 TABLET, FILM COATED ORAL at 14:03

## 2023-07-20 RX ADMIN — CLOPIDOGREL BISULFATE 75 MG: 75 TABLET ORAL at 08:39

## 2023-07-20 RX ADMIN — SODIUM CHLORIDE 75 ML/HR: 9 INJECTION, SOLUTION INTRAVENOUS at 14:19

## 2023-07-20 RX ADMIN — INSULIN LISPRO 2 UNITS: 100 INJECTION, SOLUTION INTRAVENOUS; SUBCUTANEOUS at 12:25

## 2023-07-20 RX ADMIN — INSULIN LISPRO 6 UNITS: 100 INJECTION, SOLUTION INTRAVENOUS; SUBCUTANEOUS at 23:57

## 2023-07-20 RX ADMIN — SODIUM CHLORIDE 1000 MG: 900 INJECTION INTRAVENOUS at 15:13

## 2023-07-20 RX ADMIN — ASPIRIN 325 MG: 325 TABLET ORAL at 08:39

## 2023-07-20 RX ADMIN — INSULIN HUMAN 4 UNITS: 100 INJECTION, SOLUTION PARENTERAL at 00:24

## 2023-07-20 NOTE — CASE MANAGEMENT/SOCIAL WORK
Continued Stay Note  [facility]     Patient Name: Dianne Barry  MRN: [id number]  Today's Date: 7/20/2023    Admit Date: 7/18/2023    Plan: Home   Discharge Plan       Row Name 07/20/23 1043       Plan    Plan Home    Patient/Family in Agreement with Plan yes    Plan Comments Spoke with patient at bedside. Plan is home. Family will transport. Patient deneis any discharge needs at this time. CM will continue to follow.    Final Discharge Disposition Code 01 - home or self-care                   Discharge Codes    No documentation.                 Expected Discharge Date and Time       Expected Discharge Date Expected Discharge Time    Jul 21, 2023               Ortiz Jarrett RN

## 2023-07-20 NOTE — PROGRESS NOTES
[facility]  PROGRESS NOTE    Patient Name: Dianne Barry  : 1945  MRN: [id number]    Date of Admission: 2023  Primary Care Physician: Daron Dotson MD    Subjective   Subjective     CC:  Dysarthria, mental status change     HPI:  Patient is resting in bed in NAD. She states she feels good and ready to go home     ROS:  Gen- No fevers, chills  CV- No chest pain, palpitations  Resp- No cough, dyspnea  GI- No N/V/D, abd pain       Objective   Objective     Vital Signs:   Temp:  [97.6 °F (36.4 °C)-98.7 °F (37.1 °C)] 97.6 °F (36.4 °C)  Heart Rate:  [61-87] 71  Resp:  [16-20] 16  BP: (103-156)/(58-90) 156/90     Physical Exam:  Constitutional: No acute distress, awake, alert  HENT: NCAT, mucous membranes moist  Respiratory: Clear to auscultation bilaterally, respiratory effort normal room air 96%  Cardiovascular: RRR, no murmurs, rubs, or gallops  Gastrointestinal: Positive bowel sounds, soft, nontender, nondistended  Musculoskeletal: No bilateral ankle edema  Psychiatric: Appropriate affect, cooperative  Neurologic: Oriented x 3, strength symmetric in all extremities, Cranial Nerves grossly intact to confrontation, speech clear  Skin: No rashes      Results Reviewed:  LAB RESULTS:      Lab 23   WBC 8.19 4.91  --    HEMOGLOBIN 12.7 12.1  --    HEMOGLOBIN, POC  --   --  12.9   HEMATOCRIT 38.0 38.7  --    HEMATOCRIT POC  --   --  38   PLATELETS 193 173  --    NEUTROS ABS 7.48* 3.49  --    IMMATURE GRANS (ABS) 0.04 0.07*  --    LYMPHS ABS 0.48* 0.77  --    MONOS ABS 0.17 0.47  --    EOS ABS 0.00 0.08  --    MCV 83.2 86.0  --    PROTIME  --   --  11.7*   APTT  --  30.5  --          Lab 23  0701 23   SODIUM 133*  --    POTASSIUM 3.9  --    CHLORIDE 94*  --    CO2 23.0  --    ANION GAP 16.0*  --    BUN 14  --    CREATININE 0.72 0.90   EGFR 85.7 65.6   GLUCOSE 274*  --    CALCIUM 9.4  --    MAGNESIUM 1.7  --     HEMOGLOBIN A1C 7.60*  --          Lab 07/19/23  0701 07/18/23  2354   TOTAL PROTEIN 7.6  --    ALBUMIN 4.3  --    GLOBULIN 3.3  --    ALT (SGPT) 8 8   AST (SGOT) 16 15   BILIRUBIN 0.3  --    ALK PHOS 89  --          Lab 07/18/23  2354 07/18/23  2351   HSTROP T 8  --    PROTIME  --  11.7*   INR  --  1.0         Lab 07/19/23  0701   CHOLESTEROL 169   LDL CHOL 78   HDL CHOL 68*   TRIGLYCERIDES 132             Brief Urine Lab Results  (Last result in the past 365 days)        Color   Clarity   Blood   Leuk Est   Nitrite   Protein   CREAT   Urine HCG        07/20/23 1415 Yellow   Cloudy   Negative   Moderate (2+)   Positive   Negative                   Microbiology Results Abnormal       None            EEG    Result Date: 7/20/2023  Reason for referral: 78 y.o.female with speech changes, aphasia, consideration of seizure Technical Summary:  A 19 channel digital EEG was performed using the international 10-20 placement system, including eye leads and EKG leads. Duration: 22 minutes Findings: The awake tracing shows diffuse low amplitude 6 to 7 Hz theta present symmetrically over both hemispheres.  A clear posterior rhythm is not seen.  EMG artifact is variably prominent.  IV pump artifact is present throughout.  Toward the latter portion of the study, artifact is present at F8 but the remainder of the study is of good technical quality.  Photic stimulation does not change the background.  Hyperventilation is not performed.  No focal features or epileptiform activity are seen. Video: Available Technical quality: Superior SUMMARY: Mild generalized slow No focal features or epileptiform activity are seen     Impression: Diffuse cerebral dysfunction of mild degree, nonspecific No evidence for epilepsy is seen This report is transcribed using the Dragon dictation system.      CT Angiogram Neck    Result Date: 7/19/2023  EXAMINATION: CT ANGIOGRAPHY HEAD AND NECK, CT PERFUSION DATE:  7/18/2023 INDICATION: Slurred speech.  Aphasia. COMPARISON: Noncontrast CT head 7/18/2023 TECHNIQUE: CT angiography through the head and neck was performed in the axial plane using 115 mL of Isovue-370 administered intravenously, without adverse reaction. Coronal and sagittal MIP and MPR images were generated. CT perfusion imaging was performed and color maps were generated for cerebral blood flow, cerebral blood volume, mean transit time, time to drain, and Tmax. Rapid AI perfusion analysis was included.CT dose lowering techniques were used, to include: automated exposure control, adjustment for patient size, and or use of iterative reconstruction. Stenoses measured based on NASCET criteria. FINDINGS: CTA neck: Arch and great vessels: Ectasia and mild to moderate atherosclerotic calcification at the aortic arch. Great vessel origins are patent. Right carotid: The common carotid, internal carotid, and external carotid arteries are without occlusion, significant stenosis, or evidence of dissection. Atherosclerotic calcification at the carotid bifurcation and carotid bulb. Left carotid: The common carotid, internal carotid, and external carotid arteries are without occlusion, significant stenosis, or evidence of dissection. Atherosclerotic calcification at the carotid bifurcation and carotid bulb. Vertebral arteries: The vertebral arteries are uniform in caliber, with no occlusion, significant stenosis, or evidence of dissection. Osseous and soft tissue structures: Minimal cervical lordotic reversal. Moderate disc height loss at C4-5. Severe disc height loss at C5-6 and C6-7. Multilevel facet and uncinate hypertrophy. Associated neural foraminal narrowing is most pronounced on the left at C4-5 and bilaterally at C5-6. CTA Head: Anterior Circulation: Mild atherosclerotic calcification of the internal carotid arteries. No occlusion or significant stenosis. Moderate stenosis at the right MCA M1 segment, with focal atherosclerotic calcification. Moderate  stenoses in some of the right MCA M2 branches. No occlusion. Bilateral ACAs and left MCA are widely patent. Posterior Circulation: The vertebrobasilar system, superior cerebellar arteries, and posterior cerebral arteries are normal in caliber, with no occlusion, significant stenosis, or aneurysmal dilation. CT Perfusion: Blood vessel density: Recent blood vessel density in the right MCA territory (45-60% of normal). Qualitative color maps and column views: No focal deficit or significant perfusion asymmetry identified. Quantitative analysis: Volume of CBV < 34% 0 mL Volume of CBV < 38%: 0 mL Volume of CBV < 42%: 0 mL Volume of CBF< 20%: 0 mL Volume of CBF < 30%: 0 mL Volume of CBF < 34%: 0 mL Volume of CBF < 38%: 0 mL Volume of Tmax >10 seconds: 0 mL Volume of Tmax > 8 seconds: 0 mL Volume of Tmax > 6 seconds: 7 mL, involving bilateral anterior temporal lobes. Volume of Tmax > 4 seconds: 26 mL, involving bilateral temporal lobes, right greater than left cerebellar hemispheres, and patchy distribution in the periventricular white matter. This is probably artifact or otherwise clinically insignificant. Mismatch: Cerebral blood flow of less than 30% of normal and Tmax of greater than 6 seconds is used for calculation of the mismatch volume ratio. Calculated mismatch of 7 mL, but likely insignificant given the distribution described above.     Impression: CTA NECK: 1. No occlusion, significant stenosis, or evidence of dissection in the cervical carotid or vertebral arteries. 2. Atherosclerotic calcification at the carotid bifurcations and carotid bulbs. 3. Cervical spine degenerative changes as described above. CTA HEAD: 1. Moderate stenoses of the right MCA M1 segment and some of the M2 branches. 2. Mild ICA atherosclerosis. 3. No large vessel occlusion. CT PERFUSION: 1. Reduced blood vessel density in the right MCA territory, likely secondary to the stenoses described above. 2. Patchy areas of elevated Tmax in  bilateral cerebral hemispheres as described above, likely artifactual or otherwise clinically insignificant. No suspicious perfusion abnormality is identified. If there remains high suspicion for acute ischemic infarct, MRI would be more sensitive. Electronically signed by:  Henrique Olea M.D.  7/18/2023 10:58 PM Mountain Time    MRI Brain Without Contrast    Result Date: 7/19/2023  EXAMINATION: MRI BRAIN WO CONTRAST DATE: 7/19/2023 3:40 AM  HISTORY: Follow-up stroke COMPARISON: MRI of 3/6/2023. A and perfusion of 7/18/2023.  TECHNIQUE: Multiplanar, multisequence MR imaging of the brain was performed without intraveous contrast. FINDINGS: Image quality: Degraded by motion artifact  Ventricles/Extra-axial Spaces: Enlarged by global parenchymal volume loss. Parenchyma: Diffusion-weighted imaging shows no evidence for acute ischemia/infarction.   Patchy areas of T2 prolongation are present in the hemispheric white matter, statistically most likely from chronic small vessel ischemic changes. There are lacunar  infarcts and/or prominent perivascular spaces in the basal ganglia and thalami. No gross mass effect or midline shift.  No gross midline structural abnormalities are detected on sagittal imaging. Intracranial Hemorrhage: None detected. Bones/Extracranial soft tissues: There is normal marrow signal in the skull base.  Visualized Sinuses/Mastoids: Trace mastoid fluid, likely incidental. Vascular:  Arterial flow voids at the level of the skull base are within normal limits.     Impression:  1.  Motion artifact degraded exam. 2.  No acute intracranial process detected. 3.  Volume loss and mild chronic small vessel ischemic changes.            Electronically signed by:  Oral Boyce M.D.  7/19/2023 2:54 AM Mountain Time    XR Chest 1 View    Result Date: 7/19/2023  EXAM:  XR CHEST 1 VW   DATE: 7/19/2023 12:03 AM HISTORY:  Acute Stroke Protocol (onset < 12 hrs) COMPARISON:  9/30/2017, 9/28/2017 FINDINGS and      Impression: 1.  Right lower lung bilobed nodular density 2.2 x 1.1 cm. May have been present previously, it is more conspicuous now. Consider calcifications. Recommend comparison with priors since 2017. If not available, consider CT chest without contrast within 2 weeks. 2.  Heart size is normal. 3.  No acute bony findings. Electronically signed by:  Lynda Agrawal M.D.  7/18/2023 11:26 PM Mountain Time    CT Head Without Contrast Stroke Protocol    Result Date: 7/19/2023  EXAMINATION: CT HEAD WITHOUT CONTRAST DATE: 7/18/2023 11:40 PM  INDICATION: STROKE ALERT, HEADACHE, APHASIA  COMPARISON: 2/21/23.  TECHNIQUE:  Routine axial images through the head without contrast. Coronal reformations. Low-dose CT acquisition technique included one or more of the following options: 1. Automated exposure control, 2. Adjustment of mA and/or KV according to patient's size and/or 3. Use of iterative reconstruction. FINDINGS:  Intracranial contents: New hypodensity right basal ganglia measuring 6 x 8 mm. Ventricular and cisternal spaces are prominent from volume loss. Mild periventricular and subcortical white matter hypodensities. No dominant mass, midline shift, hydrocephalus, extra axial fluid collection or acute hemorrhage. No asymmetric hyperdensity of the proximal major cerebral arteries. Craniocervical junction is patent. Bones and extracranial soft tissues: Mild mucosal thickening ethmoid air cells. Otherwise, Visualized paranasal sinuses and mastoid air cells are clear.  Skull is intact. Visualized intraorbital contents are unremarkable.     Impression: 1. No acute intracranial hemorrhage. Age indeterminate infarct right basal ganglia. MRI is more sensitive for the detection of acute nonhemorrhagic infarct. . 2. Mild changes small vessel ischemic disease of indeterminate age, presumably mostly chronic. Volume loss. Atherosclerosis. Report called to LAUREN PADILLA at 7/18/2023 9:55 PM Electronically signed by:  Ranjith  David Bo M.D.  7/18/2023 10:04 PM Mountain Time    XR Abdomen KUB    Result Date: 7/19/2023  EXAMINATION:   XR ABDOMEN KUB INDICATION:  Clearance for MRI COMPARISON: None available.     Impression: Right upper quadrant surgical clips are present. Otherwise no metallic foreign body identified. Contrast opacifies the bladder. Thank you for the referral of this patient. This exam was interpreted by an American Board of Radiology certified radiologist with subspecialty fellowship training in body imaging.   If there are any questions regarding this exam please feel free to contact a radiologist directly at [phone]. Electronically signed by:  Whit Walker M.D.  7/19/2023 1:21 AM Mountain Time    CT Angiogram Head w AI Analysis of LVO    Result Date: 7/19/2023  EXAMINATION: CT ANGIOGRAPHY HEAD AND NECK, CT PERFUSION DATE:  7/18/2023 INDICATION: Slurred speech. Aphasia. COMPARISON: Noncontrast CT head 7/18/2023 TECHNIQUE: CT angiography through the head and neck was performed in the axial plane using 115 mL of Isovue-370 administered intravenously, without adverse reaction. Coronal and sagittal MIP and MPR images were generated. CT perfusion imaging was performed and color maps were generated for cerebral blood flow, cerebral blood volume, mean transit time, time to drain, and Tmax. Rapid AI perfusion analysis was included.CT dose lowering techniques were used, to include: automated exposure control, adjustment for patient size, and or use of iterative reconstruction. Stenoses measured based on NASCET criteria. FINDINGS: CTA neck: Arch and great vessels: Ectasia and mild to moderate atherosclerotic calcification at the aortic arch. Great vessel origins are patent. Right carotid: The common carotid, internal carotid, and external carotid arteries are without occlusion, significant stenosis, or evidence of dissection. Atherosclerotic calcification at the carotid bifurcation and carotid bulb. Left carotid: The  common carotid, internal carotid, and external carotid arteries are without occlusion, significant stenosis, or evidence of dissection. Atherosclerotic calcification at the carotid bifurcation and carotid bulb. Vertebral arteries: The vertebral arteries are uniform in caliber, with no occlusion, significant stenosis, or evidence of dissection. Osseous and soft tissue structures: Minimal cervical lordotic reversal. Moderate disc height loss at C4-5. Severe disc height loss at C5-6 and C6-7. Multilevel facet and uncinate hypertrophy. Associated neural foraminal narrowing is most pronounced on the left at C4-5 and bilaterally at C5-6. CTA Head: Anterior Circulation: Mild atherosclerotic calcification of the internal carotid arteries. No occlusion or significant stenosis. Moderate stenosis at the right MCA M1 segment, with focal atherosclerotic calcification. Moderate stenoses in some of the right MCA M2 branches. No occlusion. Bilateral ACAs and left MCA are widely patent. Posterior Circulation: The vertebrobasilar system, superior cerebellar arteries, and posterior cerebral arteries are normal in caliber, with no occlusion, significant stenosis, or aneurysmal dilation. CT Perfusion: Blood vessel density: Recent blood vessel density in the right MCA territory (45-60% of normal). Qualitative color maps and column views: No focal deficit or significant perfusion asymmetry identified. Quantitative analysis: Volume of CBV < 34% 0 mL Volume of CBV < 38%: 0 mL Volume of CBV < 42%: 0 mL Volume of CBF< 20%: 0 mL Volume of CBF < 30%: 0 mL Volume of CBF < 34%: 0 mL Volume of CBF < 38%: 0 mL Volume of Tmax >10 seconds: 0 mL Volume of Tmax > 8 seconds: 0 mL Volume of Tmax > 6 seconds: 7 mL, involving bilateral anterior temporal lobes. Volume of Tmax > 4 seconds: 26 mL, involving bilateral temporal lobes, right greater than left cerebellar hemispheres, and patchy distribution in the periventricular white matter. This is probably  artifact or otherwise clinically insignificant. Mismatch: Cerebral blood flow of less than 30% of normal and Tmax of greater than 6 seconds is used for calculation of the mismatch volume ratio. Calculated mismatch of 7 mL, but likely insignificant given the distribution described above.     Impression: CTA NECK: 1. No occlusion, significant stenosis, or evidence of dissection in the cervical carotid or vertebral arteries. 2. Atherosclerotic calcification at the carotid bifurcations and carotid bulbs. 3. Cervical spine degenerative changes as described above. CTA HEAD: 1. Moderate stenoses of the right MCA M1 segment and some of the M2 branches. 2. Mild ICA atherosclerosis. 3. No large vessel occlusion. CT PERFUSION: 1. Reduced blood vessel density in the right MCA territory, likely secondary to the stenoses described above. 2. Patchy areas of elevated Tmax in bilateral cerebral hemispheres as described above, likely artifactual or otherwise clinically insignificant. No suspicious perfusion abnormality is identified. If there remains high suspicion for acute ischemic infarct, MRI would be more sensitive. Electronically signed by:  Henrique Olea M.D.  7/18/2023 10:58 PM Mountain Time    CT CEREBRAL PERFUSION WITH & WITHOUT CONTRAST    Result Date: 7/19/2023  EXAMINATION: CT ANGIOGRAPHY HEAD AND NECK, CT PERFUSION DATE:  7/18/2023 INDICATION: Slurred speech. Aphasia. COMPARISON: Noncontrast CT head 7/18/2023 TECHNIQUE: CT angiography through the head and neck was performed in the axial plane using 115 mL of Isovue-370 administered intravenously, without adverse reaction. Coronal and sagittal MIP and MPR images were generated. CT perfusion imaging was performed and color maps were generated for cerebral blood flow, cerebral blood volume, mean transit time, time to drain, and Tmax. Rapid AI perfusion analysis was included.CT dose lowering techniques were used, to include: automated exposure control, adjustment for  patient size, and or use of iterative reconstruction. Stenoses measured based on NASCET criteria. FINDINGS: CTA neck: Arch and great vessels: Ectasia and mild to moderate atherosclerotic calcification at the aortic arch. Great vessel origins are patent. Right carotid: The common carotid, internal carotid, and external carotid arteries are without occlusion, significant stenosis, or evidence of dissection. Atherosclerotic calcification at the carotid bifurcation and carotid bulb. Left carotid: The common carotid, internal carotid, and external carotid arteries are without occlusion, significant stenosis, or evidence of dissection. Atherosclerotic calcification at the carotid bifurcation and carotid bulb. Vertebral arteries: The vertebral arteries are uniform in caliber, with no occlusion, significant stenosis, or evidence of dissection. Osseous and soft tissue structures: Minimal cervical lordotic reversal. Moderate disc height loss at C4-5. Severe disc height loss at C5-6 and C6-7. Multilevel facet and uncinate hypertrophy. Associated neural foraminal narrowing is most pronounced on the left at C4-5 and bilaterally at C5-6. CTA Head: Anterior Circulation: Mild atherosclerotic calcification of the internal carotid arteries. No occlusion or significant stenosis. Moderate stenosis at the right MCA M1 segment, with focal atherosclerotic calcification. Moderate stenoses in some of the right MCA M2 branches. No occlusion. Bilateral ACAs and left MCA are widely patent. Posterior Circulation: The vertebrobasilar system, superior cerebellar arteries, and posterior cerebral arteries are normal in caliber, with no occlusion, significant stenosis, or aneurysmal dilation. CT Perfusion: Blood vessel density: Recent blood vessel density in the right MCA territory (45-60% of normal). Qualitative color maps and column views: No focal deficit or significant perfusion asymmetry identified. Quantitative analysis: Volume of CBV < 34% 0  mL Volume of CBV < 38%: 0 mL Volume of CBV < 42%: 0 mL Volume of CBF< 20%: 0 mL Volume of CBF < 30%: 0 mL Volume of CBF < 34%: 0 mL Volume of CBF < 38%: 0 mL Volume of Tmax >10 seconds: 0 mL Volume of Tmax > 8 seconds: 0 mL Volume of Tmax > 6 seconds: 7 mL, involving bilateral anterior temporal lobes. Volume of Tmax > 4 seconds: 26 mL, involving bilateral temporal lobes, right greater than left cerebellar hemispheres, and patchy distribution in the periventricular white matter. This is probably artifact or otherwise clinically insignificant. Mismatch: Cerebral blood flow of less than 30% of normal and Tmax of greater than 6 seconds is used for calculation of the mismatch volume ratio. Calculated mismatch of 7 mL, but likely insignificant given the distribution described above.     Impression: CTA NECK: 1. No occlusion, significant stenosis, or evidence of dissection in the cervical carotid or vertebral arteries. 2. Atherosclerotic calcification at the carotid bifurcations and carotid bulbs. 3. Cervical spine degenerative changes as described above. CTA HEAD: 1. Moderate stenoses of the right MCA M1 segment and some of the M2 branches. 2. Mild ICA atherosclerosis. 3. No large vessel occlusion. CT PERFUSION: 1. Reduced blood vessel density in the right MCA territory, likely secondary to the stenoses described above. 2. Patchy areas of elevated Tmax in bilateral cerebral hemispheres as described above, likely artifactual or otherwise clinically insignificant. No suspicious perfusion abnormality is identified. If there remains high suspicion for acute ischemic infarct, MRI would be more sensitive. Electronically signed by:  Henrique Olea M.D.  7/18/2023 10:58 PM Mountain Time     Results for orders placed during the hospital encounter of 03/02/23    Adult Transthoracic Echo Complete W/ Cont if Necessary Per Protocol    Interpretation Summary    Left ventricular systolic function is normal. Left ventricular ejection  fraction appears to be 56 - 60%.    Mild aortic valve regurgitation is present.    Mild tricuspid valve regurgitation is present.    Mild dilation of the ascending aorta is present at 4.2 cm.      Current medications:  Scheduled Meds:aspirin, 325 mg, Oral, Daily   Or  aspirin, 300 mg, Rectal, Daily  cefTRIAXone, 1,000 mg, Intravenous, Q24H  clopidogrel, 75 mg, Oral, Daily  insulin lispro, 2-9 Units, Subcutaneous, 4x Daily AC & at Bedtime  rosuvastatin, 20 mg, Oral, Nightly  sodium chloride, 10 mL, Intravenous, Q12H  valsartan, 80 mg, Oral, Q24H      Continuous Infusions:niCARdipine, 5-15 mg/hr, Last Rate: Stopped (07/19/23 1205)  sodium chloride, 75 mL/hr, Last Rate: 75 mL/hr (07/20/23 1419)      PRN Meds:.  acetaminophen    Calcium Replacement - Follow Nurse / BPA Driven Protocol    dextrose    dextrose    glucagon (human recombinant)    Magnesium Standard Dose Replacement - Follow Nurse / BPA Driven Protocol    nitroglycerin    ondansetron    Phosphorus Replacement - Follow Nurse / BPA Driven Protocol    Potassium Replacement - Follow Nurse / BPA Driven Protocol    sodium chloride    sodium chloride    Assessment & Plan   Assessment & Plan     Active Hospital Problems    Diagnosis  POA    **Dysarthria [R47.1]  Yes      Resolved Hospital Problems   No resolved problems to display.        Brief Hospital Course to date:  Dianne Barry is a 78 y.o. female  with history of hypertension, type 2 diabetes hyperlipidemia, history of TIA who presented with dysarthria and mental status changes admitted for CVA rule out.  speech has returned to normal.  BP is significantly better. Testing has been negative. UA was collected on 7/20 and showed infection will start antibiotics.     Plan was partially entered by my partner and I have reviewed and updated as appropriate on 7/20/23     Suspected CVA vs hypertensive emergency  Acute encephalopathy, ?  Hypertensive  History of prior TIA  -Patient with dysarthria, expressive  aphasia  -CTA head does show moderate stenosis of the right MCA M1 segment, CT perfusion with no significant perfusion asymmetry, CT head is age-indeterminate and right basal ganglia infarct.  MRI is motion degraded but no infarct seen  -Stroke neurology following, work-up per protocol,   -- ECHO in march without bubble study  -- EEG negative for seizures; diffuse cerebral dysfunction of mild degree   -Transcranial l Doppler pending  -- pt will need holter monitor at dc   -Continue DAPT, statin  -PT/OT/SLP to evaluate  -- stroke clinic follow up in 3 months     Hypertensive emergency  -BP significantly elevated on presentation, likely contributory to above  -Continue BP control,   -- goal bp < 140/80  -- restart home [facility]      Well-controlled type 2 diabetes A1c 7.6%  -Continue SSI for now     Hyperlipidemia  -- cont statin      Intracranial and extracranial atherosclerotic disease  -Right M1/M2 stenosis (asymptomatic) and bilateral carotid arteries  -Continue DAPT   -Continue statin       Expected Discharge Location and Transportation: home   Expected Discharge 7/21/23  Expected Discharge Date: 7/21/2023; Expected Discharge Time:      DVT prophylaxis:  Mechanical DVT prophylaxis orders are present.     AM-PAC 6 Clicks Score (PT): 21 (07/20/23 1103)    CODE STATUS:   Code Status and Medical Interventions:   Ordered at: 07/19/23 0509     Code Status (Patient has no pulse and is not breathing):    CPR (Attempt to Resuscitate)     Medical Interventions (Patient has pulse or is breathing):    Full Support     Comments:    d/w daughters     Release to patient:    Routine Release       Amy Malcolm, APRN  07/20/23    '

## 2023-07-20 NOTE — THERAPY DISCHARGE NOTE
Patient Name: Dianne Barry  : 1945    MRN: [id number]                              Today's Date: 2023       Admit Date: 2023    Visit Dx:     ICD-10-CM ICD-9-CM   1. Expressive aphasia  R47.01 784.3   2. Dysarthria  R47.1 784.51   3. Nonintractable headache, unspecified chronicity pattern, unspecified headache type  R51.9 784.0   4. Poorly-controlled hypertension  I10 401.9   5. Nodule of lower lobe of right lung  R91.1 793.11   6. Cognitive communication deficit  R41.841 799.52     Patient Active Problem List   Diagnosis    Severe sepsis    UTI (urinary tract infection)    Diabetes mellitus    Elevated troponin    Chronic diastolic congestive heart failure    Coronary artery disease involving native coronary artery of native heart without angina pectoris    Dysarthria     Past Medical History:   Diagnosis Date    Arthritis     Bacterial UTI 2017    HOSPITALIZED FOR SEPSIS    Cancer     Cataracts, bilateral     Chronic diastolic congestive heart failure 2018    Coronary artery disease involving native coronary artery of native heart without angina pectoris 2018    Diabetes mellitus     Gall stones     Hip fracture, right     History of transfusion     Hypertension      Past Surgical History:   Procedure Laterality Date    ABDOMINAL SURGERY      CARDIAC CATHETERIZATION N/A 10/17/2017    Procedure: Left Heart Cath;  Surgeon: Oral Velazco MD;  Location: [facility] INVASIVE LOCATION;  Service:     CATARACT EXTRACTION       SECTION      COLON SURGERY      COLONOSCOPY      CYSTOSCOPY      EYE SURGERY      FRACTURE SURGERY      HYSTERECTOMY      MULTIPLE TOOTH EXTRACTIONS      OOPHORECTOMY      TUBAL ABDOMINAL LIGATION        General Information       Row Name 23 1056          Physical Therapy Time and Intention    Document Type discharge evaluation/summary  -AB     Mode of Treatment physical therapy  -AB       Row Name 23 1056          General Information    Patient  Profile Reviewed yes  -AB     Prior Level of Function independent:;all household mobility;community mobility;gait;transfer;bed mobility;ADL's;driving;home management  Denies use of DME.  -AB     Existing Precautions/Restrictions no known precautions/restrictions  -AB     Barriers to Rehab none identified  -AB       Row Name 07/20/23 1056          Living Environment    People in Home alone  -AB       Row Name 07/20/23 1056          Home Main Entrance    Number of Stairs, Main Entrance one  -AB     Stair Railings, Main Entrance railings on both sides of stairs  -AB       Row Name 07/20/23 1056          Stairs Within Home, Primary    Stairs, Within Home, Primary 1 level with basement.  -AB       Row Name 07/20/23 1056          Cognition    Orientation Status (Cognition) oriented x 4  -AB       Row Name 07/20/23 1056          Safety Issues, Functional Mobility    Impairments Affecting Function (Mobility) endurance/activity tolerance  -AB               User Key  (r) = Recorded By, (t) = Taken By, (c) = Cosigned By      Initials Name Provider Type    AB Jayla Carrillo PT Physical Therapist                   Mobility       Row Name 07/20/23 1057          Bed Mobility    Bed Mobility supine-sit;sit-supine  -AB     Supine-Sit Valentine (Bed Mobility) supervision  -AB     Sit-Supine Valentine (Bed Mobility) supervision  -AB     Assistive Device (Bed Mobility) head of bed elevated  -AB     Comment, (Bed Mobility) No issues noted.  -AB       Row Name 07/20/23 1057          Transfers    Comment, (Transfers) Good safety awareness throughout. No AD used.  -AB       Row Name 07/20/23 1057          Sit-Stand Transfer    Sit-Stand Valentine (Transfers) standby assist  -AB       Row Name 07/20/23 1057          Gait/Stairs (Locomotion)    Valentine Level (Gait) standby assist;1 person assist  -AB     Distance in Feet (Gait) 350'  -AB     Deviations/Abnormal Patterns (Gait) gait speed decreased  -AB     Valentine Level  (Stairs) contact guard;1 person assist  -AB     Handrail Location (Stairs) both sides  -AB     Number of Steps (Stairs) 1  -AB     Ascending Technique (Stairs) step-to-step  -AB     Descending Technique (Stairs) step-to-step  -AB     Comment, (Gait/Stairs) Pt demo's step through gait pattern with slow, deliberate lupe. Pt reports all gait mechanics are at baseline. 1 step navigated with no LOB or instability noted.  -AB               User Key  (r) = Recorded By, (t) = Taken By, (c) = Cosigned By      Initials Name Provider Type    AB Jayla Carrillo, PT Physical Therapist                   Obj/Interventions       Row Name 07/20/23 1059          Range of Motion Comprehensive    General Range of Motion bilateral lower extremity ROM WNL  -AB       Row Name 07/20/23 1059          Strength Comprehensive (MMT)    General Manual Muscle Testing (MMT) Assessment no strength deficits identified  -AB     Comment, General Manual Muscle Testing (MMT) Assessment BLE strength grossly 5/5  -AB       Row Name 07/20/23 1059          Motor Skills    Motor Skills coordination  -AB     Coordination bilateral;lower extremity;heel to shin;WNL  -AB       Row Name 07/20/23 1059          Balance    Balance Assessment sitting static balance;sitting dynamic balance;standing static balance;standing dynamic balance  -AB     Static Sitting Balance independent  -AB     Dynamic Sitting Balance independent  -AB     Position, Sitting Balance unsupported;sitting edge of bed  -AB     Static Standing Balance standby assist  -AB     Dynamic Standing Balance standby assist  -AB     Position/Device Used, Standing Balance unsupported  -AB     Balance Interventions sitting;standing;sit to stand;static;dynamic;occupation based/functional task;weight shifting activity;narrowed base of support  -AB     Comment, Balance No LOB or instability noted.  -AB       Row Name 07/20/23 1059          Sensory Assessment (Somatosensory)    Sensory Assessment  (Somatosensory) LE sensation intact  -AB               User Key  (r) = Recorded By, (t) = Taken By, (c) = Cosigned By      Initials Name Provider Type    AB Jayla Carrillo, PT Physical Therapist                   Goals/Plan    No documentation.                  Clinical Impression       Row Name 07/20/23 1100          Pain    Pretreatment Pain Rating 0/10 - no pain  -AB     Posttreatment Pain Rating 0/10 - no pain  -AB       Row Name 07/20/23 1100          Pain Scale: FACES Pre/Post-Treatment    Pain: FACES Scale, Pretreatment 0-->no hurt  -AB     Posttreatment Pain Rating 0-->no hurt  -AB       Row Name 07/20/23 1100          Plan of Care Review    Plan of Care Reviewed With patient  -AB     Progress no change  -AB     Outcome Evaluation PT initial eval completed. Pt presents with good safety awareness, appropriate balance, and intact coordination. Pt ambulated 350' with SBA and no AD. No LOB or instability noted. 1 step navigated with CGA and railing use. Pt reports all functional mobility is at baseline. No further IPPT needs. PT rec d/c home when medically appropriate.  -AB       Row Name 07/20/23 1100          Therapy Assessment/Plan (PT)    Criteria for Skilled Interventions Met (PT) no;no problems identified which require skilled intervention  -AB     Therapy Frequency (PT) evaluation only  -AB       Row Name 07/20/23 1100          Vital Signs    Pre Systolic BP Rehab 147  -AB     Pre Treatment Diastolic BP 80  -AB     Pretreatment Heart Rate (beats/min) 70  -AB     Posttreatment Heart Rate (beats/min) 65  -AB     Pre SpO2 (%) 97  -AB     O2 Delivery Pre Treatment room air  -AB     O2 Delivery Intra Treatment room air  -AB     Post SpO2 (%) 97  -AB     O2 Delivery Post Treatment room air  -AB     Pre Patient Position Supine  -AB     Intra Patient Position Standing  -AB     Post Patient Position Supine  -AB       Row Name 07/20/23 1100          Positioning and Restraints    Pre-Treatment Position in bed  -AB      Post Treatment Position bed  -AB     In Bed notified nsg;supine;call light within reach;encouraged to call for assist;exit alarm on  -AB               User Key  (r) = Recorded By, (t) = Taken By, (c) = Cosigned By      Initials Name Provider Type    Jayla Berry, PT Physical Therapist                   Outcome Measures       Row Name 07/20/23 1103          How much help from another person do you currently need...    Turning from your back to your side while in flat bed without using bedrails? 4  -AB     Moving from lying on back to sitting on the side of a flat bed without bedrails? 4  -AB     Moving to and from a bed to a chair (including a wheelchair)? 3  -AB     Standing up from a chair using your arms (e.g., wheelchair, bedside chair)? 4  -AB     Climbing 3-5 steps with a railing? 3  -AB     To walk in hospital room? 3  -AB     AM-PAC 6 Clicks Score (PT) 21  -AB     Highest level of mobility 6 --> Walked 10 steps or more  -AB       Row Name 07/20/23 1010          Modified Armstrong Scale    Pre-Stroke Modified Lynn Scale 6 - Unable to determine (UTD) from the medical record documentation  -AN     Modified Armstrong Scale 1 - No significant disability despite symptoms.  Able to carry out all usual duties and activities.  -AN       Row Name 07/20/23 1103 07/20/23 1010       Functional Assessment    Outcome Measure Options AM-PAC 6 Clicks Basic Mobility (PT)  -AB AM-PAC 6 Clicks Daily Activity (OT);Modified Lynn  -AN              User Key  (r) = Recorded By, (t) = Taken By, (c) = Cosigned By      Initials Name Provider Type    Vale Solorio OT Occupational Therapist    Jayla Berry, PT Physical Therapist                                 Physical Therapy Education       Title: PT OT SLP Therapies (In Progress)       Topic: Physical Therapy (In Progress)       Point: Mobility training (Done)       Learning Progress Summary             Patient Acceptance, E,D, CHANO,DU by AB at 7/20/2023 1103                          Point: Home exercise program (Not Started)       Learner Progress:  Not documented in this visit.              Point: Body mechanics (Done)       Learning Progress Summary             Patient Acceptance, E,D, VU,DU by AB at 7/20/2023 1103                         Point: Precautions (Done)       Learning Progress Summary             Patient Acceptance, E,D, VU,DU by AB at 7/20/2023 1103                                         User Key       Initials Effective Dates Name Provider Type Discipline    AB 09/22/22 -  Jayla Carrillo, PT Physical Therapist PT                  PT Recommendation and Plan     Plan of Care Reviewed With: patient  Progress: no change  Outcome Evaluation: PT initial eval completed. Pt presents with good safety awareness, appropriate balance, and intact coordination. Pt ambulated 350' with SBA and no AD. No LOB or instability noted. 1 step navigated with CGA and railing use. Pt reports all functional mobility is at baseline. No further IPPT needs. PT rec d/c home when medically appropriate.     Time Calculation:   PT Evaluation Complexity  History, PT Evaluation Complexity: 1-2 personal factors and/or comorbidities  Examination of Body Systems (PT Eval Complexity): total of 3 or more elements  Clinical Presentation (PT Evaluation Complexity): stable  Clinical Decision Making (PT Evaluation Complexity): low complexity  Overall Complexity (PT Evaluation Complexity): low complexity     PT Charges       Row Name 07/20/23 1104             Time Calculation    Start Time 0950  -AB      PT Received On 07/20/23  -AB         Untimed Charges    PT Eval/Re-eval Minutes 47  -AB         Total Minutes    Untimed Charges Total Minutes 47  -AB       Total Minutes 47  -AB                User Key  (r) = Recorded By, (t) = Taken By, (c) = Cosigned By      Initials Name Provider Type    AB Jayla Carrillo, PT Physical Therapist                  Therapy Charges for Today       Code Description Service  Date Service Provider Modifiers Qty    51841191201 HC PT EVAL LOW COMPLEXITY 4 7/20/2023 Jayla Carrillo, PT GP 1            PT G-Codes  Outcome Measure Options: AM-PAC 6 Clicks Basic Mobility (PT)  AM-PAC 6 Clicks Score (PT): 21  AM-PAC 6 Clicks Score (OT): 24  Modified Lynn Scale: 1 - No significant disability despite symptoms.  Able to carry out all usual duties and activities.  PT Discharge Summary  Anticipated Discharge Disposition (PT): home  Reason for Discharge: At baseline function    Jayla Carrillo, PT  7/20/2023

## 2023-07-20 NOTE — CONSULTS
Diabetes Education  Assessment/Teaching    Patient Name:  Dianne Barry  YOB: 1945  MRN: [id number]  Admit Date:  7/18/2023    Consult received per stroke protocol.  Ms. Barry seen at bedside for DM education.  Discussed and taught Ms. Barry about type 2 diabetes self-management, risk factors, and importance of blood glucose control to reduce complications. Target blood glucose readings and A1c goals per ADA were reviewed. Reviewed with patient current A1c 7.6% and discussed its significance. She reports that her A1c is usually between 6 and 7, but she has had a few stressful life events in the last few months.  She denied major diet or lifestyle changes.      Signs, symptoms, and treatment of hyperglycemia and hypoglycemia were discussed. Lifestyle changes such as physical activity with MD approval and healthy eating were encouraged.  We discussed the benefits of monitoring BG at home and different meter options.    Patient has been scheduled for outpatient diabetes education follow up visit on 8/9/23 at 1:30 pm via phone. Outpatient staff will provide reminder call prior to appointment. Patient was given reminder card with date and time of appointment and our contact information.       Electronically signed by:  Dayna Langford RN  07/20/23 16:08 EDT

## 2023-07-20 NOTE — THERAPY DISCHARGE NOTE
Acute Care - Occupational Therapy Discharge  [facility]    Patient Name: Dianne Barry  : 1945    MRN: [id number]                              Today's Date: 2023       Admit Date: 2023    Visit Dx:     ICD-10-CM ICD-9-CM   1. Expressive aphasia  R47.01 784.3   2. Dysarthria  R47.1 784.51   3. Nonintractable headache, unspecified chronicity pattern, unspecified headache type  R51.9 784.0   4. Poorly-controlled hypertension  I10 401.9   5. Nodule of lower lobe of right lung  R91.1 793.11   6. Cognitive communication deficit  R41.841 799.52     Patient Active Problem List   Diagnosis    Severe sepsis    UTI (urinary tract infection)    Diabetes mellitus    Elevated troponin    Chronic diastolic congestive heart failure    Coronary artery disease involving native coronary artery of native heart without angina pectoris    Dysarthria     Past Medical History:   Diagnosis Date    Arthritis     Bacterial UTI 2017    HOSPITALIZED FOR SEPSIS    Cancer     Cataracts, bilateral     Chronic diastolic congestive heart failure 2018    Coronary artery disease involving native coronary artery of native heart without angina pectoris 2018    Diabetes mellitus     Gall stones     Hip fracture, right     History of transfusion     Hypertension      Past Surgical History:   Procedure Laterality Date    ABDOMINAL SURGERY      CARDIAC CATHETERIZATION N/A 10/17/2017    Procedure: Left Heart Cath;  Surgeon: Oral Velazco MD;  Location: [facility] INVASIVE LOCATION;  Service:     CATARACT EXTRACTION       SECTION      COLON SURGERY      COLONOSCOPY      CYSTOSCOPY      EYE SURGERY      FRACTURE SURGERY      HYSTERECTOMY      MULTIPLE TOOTH EXTRACTIONS      OOPHORECTOMY      TUBAL ABDOMINAL LIGATION        General Information       Row Name 23 0957          OT Time and Intention    Document Type discharge evaluation/summary  -AN     Mode of Treatment occupational therapy  -AN       Row Name 23  0957          General Information    Patient Profile Reviewed yes  -AN     Prior Level of Function independent:;all household mobility;community mobility;gait;ADL's;driving;home management  -AN     Existing Precautions/Restrictions no known precautions/restrictions  -AN     Barriers to Rehab none identified  -AN       Row Name 07/20/23 0957          Living Environment    People in Home alone  -AN       Row Name 07/20/23 0957          Home Main Entrance    Number of Stairs, Main Entrance one  -AN     Stair Railings, Main Entrance railings safe and in good condition;railings on both sides of stairs  -AN       Row Name 07/20/23 0957          Stairs Within Home, Primary    Stairs, Within Home, Primary 1 level house with a basement  -AN       Row Name 07/20/23 0957          Cognition    Orientation Status (Cognition) oriented x 4  -AN               User Key  (r) = Recorded By, (t) = Taken By, (c) = Cosigned By      Initials Name Provider Type    AN Vale Stern OT Occupational Therapist                   Mobility/ADL's       Row Name 07/20/23 0958          Bed Mobility    Bed Mobility supine-sit-supine  -AN     Supine-Sit-Supine [place] (Bed Mobility) supervision  -AN     Assistive Device (Bed Mobility) head of bed elevated  -AN       Row Name 07/20/23 0958          Transfers    Transfers sit-stand transfer;stand-sit transfer  -AN       Row Name 07/20/23 0958          Sit-Stand Transfer    Sit-Stand [place] (Transfers) standby assist  -AN       Row Name 07/20/23 0958          Stand-Sit Transfer    Stand-Sit [place] (Transfers) standby assist  -AN       Row Name 07/20/23 0958          Functional Mobility    Functional Mobility- Ind. Level standby assist  -AN     Functional Mobility-Distance (Feet) --  >household distance  -AN     Functional Mobility- Comment pt ambulated >household distance with stand by assist and no LOB.  -AN       Row Name 07/20/23 0958          Activities of Daily Living    BADL  Assessment/Intervention upper body dressing;lower body dressing;feeding  -AN       Row Name 07/20/23 0958          Upper Body Dressing Assessment/Training    [place] Level (Upper Body Dressing) don;pajama/robe  -AN     Position (Upper Body Dressing) edge of bed sitting  -AN       Row Name 07/20/23 0958          Lower Body Dressing Assessment/Training    [place] Level (Lower Body Dressing) don;socks;set up  -AN     Position (Lower Body Dressing) edge of bed sitting  -AN       Row Name 07/20/23 0958          Self-Feeding Assessment/Training    [place] Level (Feeding) prepare tray/open items;scoop food and bring to mouth;independent  -AN     Position (Self-Feeding) sitting up in bed  -AN               User Key  (r) = Recorded By, (t) = Taken By, (c) = Cosigned By      Initials Name Provider Type    Vale Solorio OT Occupational Therapist                   Obj/Interventions       Row Name 07/20/23 1004          Sensory Assessment (Somatosensory)    Sensory Assessment (Somatosensory) sensation intact  -AN       Row Name 07/20/23 1004          Vision Assessment/Intervention    Visual Impairment/Limitations WFL  -AN       [facility] Name 07/20/23 1004          Range of Motion Comprehensive    General Range of Motion upper extremity range of motion deficits identified  -AN     Comment, General Range of Motion L shoulder limited by rotator cuff injury  -AN       Row Name 07/20/23 1004          Strength Comprehensive (MMT)    General Manual Muscle Testing (MMT) Assessment no strength deficits identified  -AN       Row Name 07/20/23 1004          Balance    Balance Assessment sitting static balance;sitting dynamic balance;sit to stand dynamic balance;standing static balance;standing dynamic balance  -AN     Static Sitting Balance independent  -AN     Dynamic Sitting Balance independent  -AN     Position, Sitting Balance sitting edge of bed  -AN     Sit to Stand Dynamic Balance standby assist  -AN     Static  Standing Balance standby assist  -AN     Dynamic Standing Balance standby assist  -AN     Position/Device Used, Standing Balance unsupported  -AN     Comment, Balance no LOB throughout  -AN               User Key  (r) = Recorded By, (t) = Taken By, (c) = Cosigned By      Initials Name Provider Type    Vale Solorio, SUMA Occupational Therapist                   Goals/Plan    No documentation.                  Clinical Impression       Row Name 07/20/23 1005          Pain Assessment    Pretreatment Pain Rating 0/10 - no pain  -AN     Posttreatment Pain Rating 0/10 - no pain  -AN     Pre/Posttreatment Pain Comment asymptomatic  -AN     Pain Intervention(s) Ambulation/increased activity;Repositioned  -AN       [facility] Name 07/20/23 1005          Plan of Care Review    Plan of Care Reviewed With patient  -AN     Progress no change  -AN     Outcome Evaluation Pt presents near her baseline with all mobility and ADLs. Pt ambulated >household distance with stand by assist and no LOB. No assist required for ADLs. No skilled OT warranted at this time. Rec home at [place].  -AN       Row Name 07/20/23 1005          Therapy Assessment/Plan (OT)    Criteria for Skilled Therapeutic Interventions Met (OT) no problems identified which require skilled intervention  -AN     Therapy Frequency (OT) evaluation only  -AN       Row Name 07/20/23 1005          Therapy Plan Review/Discharge Plan (OT)    Anticipated Discharge Disposition (OT) home  -AN       Row Name 07/20/23 1005          Vital Signs    Pre Systolic BP Rehab --  VSS  -AN     O2 Delivery Pre Treatment room air  -AN     O2 Delivery Intra Treatment room air  -AN     O2 Delivery Post Treatment room air  -AN     Pre Patient Position Supine  -AN     Intra Patient Position Standing  -AN     Post Patient Position Supine  -AN       Row Name 07/20/23 1005          Positioning and Restraints    Pre-Treatment Position in bed  -AN     Post Treatment Position bed  -AN     In Bed notified  nsg;call light within reach;encouraged to call for assist;exit alarm on;supine;side rails up x2  -AN               User Key  (r) = Recorded By, (t) = Taken By, (c) = Cosigned By      Initials Name Provider Type    Vale Solorio OT Occupational Therapist                   Outcome Measures       Row Name 07/20/23 1010          How much help from another is currently needed...    Putting on and taking off regular lower body clothing? 4  -AN     Bathing (including washing, rinsing, and drying) 4  -AN     Toileting (which includes using toilet bed pan or urinal) 4  -AN     Putting on and taking off regular upper body clothing 4  -AN     Taking care of personal grooming (such as brushing teeth) 4  -AN     Eating meals 4  -AN     AM-PAC 6 Clicks Score (OT) 24  -AN       Row Name 07/20/23 1010          Modified Shirley Scale    Pre-Stroke Modified Lynn Scale 6 - Unable to determine (UTD) from the medical record documentation  -AN     Modified Lynn Scale 1 - No significant disability despite symptoms.  Able to carry out all usual duties and activities.  -AN       Row Name 07/20/23 1010          Functional Assessment    Outcome Measure Options AM-PAC 6 Clicks Daily Activity (OT);Modified Shirley  -AN               User Key  (r) = Recorded By, (t) = Taken By, (c) = Cosigned By      Initials Name Provider Type    Vale Solorio OT Occupational Therapist                  Occupational Therapy Education       Title: PT OT SLP Therapies (In Progress)       Topic: Occupational Therapy (In Progress)       Point: ADL training (Done)       Description:   Instruct learner(s) on proper safety adaptation and remediation techniques during self care or transfers.   Instruct in proper use of assistive devices.                  Learning Progress Summary             Patient Acceptance, E, VU by ROSARIO at 7/20/2023 1012                         Point: Home exercise program (Not Started)       Description:   Instruct learner(s) on  appropriate technique for monitoring, assisting and/or progressing therapeutic exercises/activities.                  Learner Progress:  Not documented in this visit.              Point: Precautions (Done)       Description:   Instruct learner(s) on prescribed precautions during self-care and functional transfers.                  Learning Progress Summary             Patient Acceptance, E, VU by AN at 7/20/2023 1012                         Point: Body mechanics (Done)       Description:   Instruct learner(s) on proper positioning and spine alignment during self-care, functional mobility activities and/or exercises.                  Learning Progress Summary             Patient Acceptance, E, VU by AN at 7/20/2023 1012                                         User Key       Initials Effective Dates Name Provider Type Discipline    AN 09/21/21 -  Vale Stern OT Occupational Therapist OT                  OT Recommendation and Plan  Therapy Frequency (OT): evaluation only  Plan of Care Review  Plan of Care Reviewed With: patient  Progress: no change  Outcome Evaluation: Pt presents near her baseline with all mobility and ADLs. Pt ambulated >household distance with stand by assist and no LOB. No assist required for ADLs. No skilled OT warranted at this time. Rec home at [place].  Plan of Care Reviewed With: patient  Outcome Evaluation: Pt presents near her baseline with all mobility and ADLs. Pt ambulated >household distance with stand by assist and no LOB. No assist required for ADLs. No skilled OT warranted at this time. Rec home at [place].     Time Calculation:   Evaluation Complexity (OT)  Review Occupational Profile/Medical/Therapy History Complexity: brief/low complexity  Assessment, Occupational Performance/Identification of Deficit Complexity: 1-3 performance deficits  Clinical Decision Making Complexity (OT): problem focused assessment/low complexity  Overall Complexity of Evaluation (OT): low complexity     Time  Calculation- OT       Row Name 07/20/23 1013             Time Calculation- OT    OT Start Time 0828  -AN      OT Received On 07/20/23  -AN         Untimed Charges    OT Eval/Re-eval Minutes 46  -AN         Total Minutes    Untimed Charges Total Minutes 46  -AN       Total Minutes 46  -AN                User Key  (r) = Recorded By, (t) = Taken By, (c) = Cosigned By      Initials Name Provider Type    AN Vale Stern OT Occupational Therapist                  Therapy Charges for Today       Code Description Service Date Service Provider Modifiers Qty    [id number]  OT EVAL LOW COMPLEXITY 4 7/20/2023 Vale Stern OT GO 1               OT Discharge Summary  Anticipated Discharge Disposition (OT): home  Reason for Discharge: At baseline function  Discharge Destination: Home    Vale Stern OT  7/20/2023

## 2023-07-20 NOTE — PROGRESS NOTES
Neurology Note    Patient:  Dianne Barry    YOB: 1945    REFERRING PHYSICIAN:  Dr. Diane    CHIEF COMPLAINT:    Difficulty with speech    HISTORY OF PRESENT ILLNESS:   No acute events overnight.  Patient is currently sitting in the bed and just had her transcranial Doppler performed.  She denies any recurrent or new strokelike symptoms.  She has worked with physical therapy who states that she is good to go home.    Past Medical History:  Past Medical History:   Diagnosis Date    Arthritis     Bacterial UTI 2017    HOSPITALIZED FOR SEPSIS    Cancer     Cataracts, bilateral     Chronic diastolic congestive heart failure 2018    Coronary artery disease involving native coronary artery of native heart without angina pectoris 2018    Diabetes mellitus     Gall stones     Hip fracture, right     History of transfusion     Hypertension        Past Surgical History:  Past Surgical History:   Procedure Laterality Date    ABDOMINAL SURGERY      CARDIAC CATHETERIZATION N/A 10/17/2017    Procedure: Left Heart Cath;  Surgeon: Oral Velazco MD;  Location: [facility] CATH INVASIVE LOCATION;  Service:     CATARACT EXTRACTION       SECTION      COLON SURGERY      COLONOSCOPY      CYSTOSCOPY      EYE SURGERY      FRACTURE SURGERY      HYSTERECTOMY      MULTIPLE TOOTH EXTRACTIONS      OOPHORECTOMY      TUBAL ABDOMINAL LIGATION         Social History:   Social History     Socioeconomic History    Marital status:    Tobacco Use    Smoking status: Former     Types: Cigarettes     Passive exposure: Never    Smokeless tobacco: Never   Vaping Use    Vaping Use: Never used   Substance and Sexual Activity    Alcohol use: Not Currently     Alcohol/week: 1.0 standard drink     Types: 1 Drinks containing 0.5 oz of alcohol per week     Comment: 4 times per month, 1 drink    Drug use: No    Sexual activity: Defer        Family History:   Family History   Problem Relation Age of Onset    Heart disease Mother      Hyperlipidemia Mother     Hypertension Mother     Diabetes Mother     Heart attack Mother     Heart attack Father     Depression Other     Breast cancer Neg Hx     Ovarian cancer Neg Hx        Medications Prior to Admission:    Prior to Admission medications    Medication Sig Start Date End Date Taking? Authorizing Provider   aspirin 81 MG EC tablet Take 1 tablet by mouth Every Morning. OTC   Yes Kimmy Jang MD   Biotin 5 MG tablet Take 1 tablet by mouth Daily. OTC   Yes Kimmy Jang MD   Cinnamon 500 MG tablet Take 2 capsules by mouth Daily. OTC   Yes Kimmy Jang MD   estradiol (ESTRACE) 1 MG tablet Take 1 tablet by mouth Daily. 7/26/21  Yes Kimmy Jang MD   glimepiride (AMARYL) 1 MG tablet Take 1 tablet by mouth Daily. 6/17/21  Yes Kimmy Jang MD   meclizine 25 MG chewable tablet chewable tablet Chew 1 tablet As Needed. OTC   Yes Kimmy Jang MD   meloxicam (MOBIC) 15 MG tablet Take 1 tablet by mouth Daily.   Yes Kimmy Jang MD   metoprolol tartrate (LOPRESSOR) 50 MG tablet Take 0.5 tablets by mouth Every 12 (Twelve) Hours.  Patient taking differently: Take 1 tablet by mouth 2 (Two) Times a Day. 3/16/22  Yes Artem Barber PA   Omega-3 Fatty Acids (FISH OIL) 1000 MG capsule capsule Take 2 capsules by mouth Daily With Breakfast. OTC   Yes Kimmy Jang MD   rosuvastatin (CRESTOR) 5 MG tablet Take 1 tablet by mouth Every Other Day. At night 1/8/18  Yes Kimmy Jang MD   Testosterone Propionate (FIRST-TESTOSTERONE) 2 % ointment Place 1 application on the skin as directed by provider Every Morning. PLACES ON INNER THIGH   Yes Kimmy Jang MD   valsartan (DIOVAN) 80 MG tablet Take 1 tablet by mouth Daily. NEEDS APPOINTMENT FOR FUTURE REFILLS 6/14/23  Yes Artem Barber PA   clopidogrel (PLAVIX) 75 MG tablet Take 1 tablet by mouth Daily.    Kimmy Jang MD   pantoprazole (PROTONIX) 20 MG EC tablet Take 1  tablet by mouth 2 (Two) Times a Day.    ProviderKimmy MD   mupirocin (BACTROBAN) 2 % ointment 1 application  into the nostril(s) as directed by provider Daily. 7/30/21 7/19/23  ProviderKimmy MD       Allergies:  Darvon [propoxyphene] and Atorvastatin      Review of system  Review of Systems   Constitutional:  Positive for activity change and fatigue. Negative for chills and fever.   Eyes: Negative.    Respiratory: Negative.     Cardiovascular: Negative.  Negative for palpitations.   Gastrointestinal: Negative.    Genitourinary: Negative.    Musculoskeletal: Negative.    Skin: Negative.    Neurological:  Positive for speech difficulty and headaches. Negative for seizures, syncope, weakness and numbness.   Hematological: Negative.    Psychiatric/Behavioral: Negative.       Vitals:    07/20/23 0655   BP: 129/61   Pulse: 74   Resp: 16   Temp: 97.9 °F (36.6 °C)   SpO2: 98%       Physical Exam  Vitals and nursing note reviewed.   Constitutional:       General: She is not in acute distress.     Appearance: Normal appearance. She is not ill-appearing.   HENT:      Head: Normocephalic and atraumatic.      Mouth/Throat:      Mouth: Mucous membranes are moist.   Eyes:      Extraocular Movements: Extraocular movements intact.      Pupils: Pupils are equal, round, and reactive to light.   Cardiovascular:      Rate and Rhythm: Normal rate and regular rhythm.      Comments: Occasional PACs  Pulmonary:      Effort: Pulmonary effort is normal. No respiratory distress.      Breath sounds: Normal breath sounds.      Comments: On room air  Musculoskeletal:      Right lower leg: No edema.      Left lower leg: No edema.   Skin:     General: Skin is warm and dry.   Neurological:      General: No focal deficit present.      Mental Status: She is alert and oriented to person, place, and time.      Motor: Motor strength is normal.   Psychiatric:         Speech: Speech normal.         Behavior: Behavior normal.        Neurological Exam  Mental Status  Alert. Oriented to person, place, time and situation. Oriented to person, place, and time. Speech is normal. Language is fluent with no aphasia. Attention and concentration are normal.    Cranial Nerves  CN II: Visual fields full to confrontation.  CN III, IV, VI: Extraocular movements intact bilaterally. Pupils equal round and reactive to light bilaterally.  CN V: Facial sensation is normal.  CN VII: Full and symmetric facial movement.  CN VIII: Hearing intact bilaterally.  CN IX, X: Palate elevates symmetrically  CN XII: Tongue midline without atrophy or fasciculations.    Motor  Normal muscle bulk throughout. No fasciculations present. Normal muscle tone. Strength is 5/5 throughout all four extremities.    Sensory  Sensation is intact to light touch, pinprick, vibration and proprioception in all four extremities.    Coordination  Right: Finger-to-nose normal.Left: Finger-to-nose normal.    Gait    Not observed.    Results Reviewed    MRI brain without contrast reveals chronic small vessel disease however no evidence of acute stroke.    CTA head/neck with moderate right M1 and M2 stenosis with mild atherosclerotic disease in bilateral carotid arteries.  No evidence of LVO.    CT perfusion reveals artifactual hypoperfusion in bilateral cerebral hemispheres.  No evidence of LVO.  CBF and CBV are normal.      A1c 7.60  LDL 78  H/H12.7/38.0  Platelets 193  Glucose 274  Creatinine 0.72, BUN 14  Sodium 133  AST 16  ALT 8    Results for orders placed during the hospital encounter of 03/02/23    Adult Transthoracic Echo Complete W/ Cont if Necessary Per Protocol    Interpretation Summary    Left ventricular systolic function is normal. Left ventricular ejection fraction appears to be 56 - 60%.    Mild aortic valve regurgitation is present.    Mild tricuspid valve regurgitation is present.    Mild dilation of the ascending aorta is present at 4.2 cm.  Left atrial cavity normal  size    EEG is negative for seizure activity; diffuse cerebral dysfunction of mild degree     Transcranial Doppler pending      Assessment and Plan     This is a 78-year-old female known medical diagnosis of prior TIA, essential hypertension, diabetes melitis type II, heart failure, coronary artery disease, and remote tobacco abuse who presented to the ED on 7/18 with dysarthria, confusion, expressive aphasia including the word salad.  The patient was not a candidate for IV thrombolytic therapy given LKW >4.5 hours and was not a candidate for endovascular therapy as CTA head/neck and CT perfusion scan were negative for large vesicles of stroke.    Antiplatelet PTA: Aspirin 81 mg and Plavix 75mg  Anticoagulant PTA: None    Transient neurological symptoms   Differentials include HTN urgency vs TIA vs partial seizure vs migraine aura.   -MRI with chronic small vessel changes.   -TTE unremarkable in March, bubble study not done; will need cardiac monitor at discharge to evaluate for PAF  -Continue DAPT; check P2Y12  -EEG negative for seizures; diffuse cerebral dysfunction of mild degree   -Transcranial l Doppler pending  -PT/OT evaluation  -Stroke clinic follow-up in 3 months    2.   Essential hypertension  -Okay for normal blood pressure goals, goal <140/80  -Management per hospitalist    3.   Diabetes mellitus type 2  -A1c on admission 7.60, goal <7  -Goal blood glucose <140  -Maintain euglycemia, management per hospitalist  -Diabetes educator to meet with patient prior to discharge    4.  Hyperlipidemia  -LDL 78, goal <70  -Continue Crestor 10mg every other day; patient cannot tolerate high doses taken daily secondary to myalgias    5.  Intracranial and extracranial atherosclerotic disease  -Right M1/M2 stenosis (asymptomatic) and bilateral carotid arteries  -Continue DAPT   -Continue statin    Plan of care was discussed with patient, primary team, and Dr. Villasenor.  From a neurological standpoint the patient be  discharged home once cleared from the primary team.    Discussed the importance of medication compliance Plavix 75mg daily and Aspirin 81mg daily and Crestor 10mg every other night and lifestyle modifications adequate control of blood pressure, adequate control of cholesterol (goal LDL <70), adequate control of glucose (<140, A1c goal <7), and increased physical activity to help reduce the risk of future cerebrovascular events.  Also discussed the signs symptoms that would warrant the patient return back to the emergency department including unilateral weakness, unilateral numbness, visual disturbances, loss of balance, speech difficulties, and/or a sudden severe headache.  Patient voices her understanding.    Electronically signed by KEISHA Cardoso on 7/20/2023 at 08:10 EDT    Addendum 1645: P2Y12 is 124, indicating adequate Plavix responsiveness.  Would not recommend changing any of the patient's antiplatelet medications at this time.  TCD report is pending however appears to be within normal limits on preliminary report.

## 2023-07-20 NOTE — PLAN OF CARE
Goal Outcome Evaluation:  Plan of Care Reviewed With: patient        Progress: no change  Outcome Evaluation: Pt presents near her baseline with all mobility and ADLs. Pt ambulated >household distance with stand by assist and no LOB. No assist required for ADLs. No skilled OT warranted at this time. Rec home at [place].      Anticipated Discharge Disposition (OT): home

## 2023-07-20 NOTE — PLAN OF CARE
Goal Outcome Evaluation:  Plan of Care Reviewed With: patient        Progress: no change  Outcome Evaluation: PT initial eval completed. Pt presents with good safety awareness, appropriate balance, and intact coordination. Pt ambulated 350' with SBA and no AD. No LOB or instability noted. 1 step navigated with CGA and railing use. Pt reports all functional mobility is at baseline. No further IPPT needs. PT rec d/c home when medically appropriate.      Anticipated Discharge Disposition (PT): home

## 2023-07-21 VITALS
BODY MASS INDEX: 28.99 KG/M2 | SYSTOLIC BLOOD PRESSURE: 139 MMHG | OXYGEN SATURATION: 91 % | HEART RATE: 71 BPM | HEIGHT: 65 IN | DIASTOLIC BLOOD PRESSURE: 71 MMHG | TEMPERATURE: 98.5 F | RESPIRATION RATE: 16 BRPM | WEIGHT: 174 LBS

## 2023-07-21 LAB
GLUCOSE BLDC GLUCOMTR-MCNC: 129 MG/DL (ref 70–130)
GLUCOSE BLDC GLUCOMTR-MCNC: 209 MG/DL (ref 70–130)

## 2023-07-21 PROCEDURE — 99239 HOSP IP/OBS DSCHRG MGMT >30: CPT | Performed by: NURSE PRACTITIONER

## 2023-07-21 PROCEDURE — 63710000001 INSULIN LISPRO (HUMAN) PER 5 UNITS: Performed by: INTERNAL MEDICINE

## 2023-07-21 PROCEDURE — G0378 HOSPITAL OBSERVATION PER HR: HCPCS

## 2023-07-21 PROCEDURE — 82948 REAGENT STRIP/BLOOD GLUCOSE: CPT

## 2023-07-21 PROCEDURE — 25010000002 CEFTRIAXONE PER 250 MG: Performed by: NURSE PRACTITIONER

## 2023-07-21 RX ORDER — ROSUVASTATIN CALCIUM 10 MG/1
10 TABLET, COATED ORAL EVERY OTHER DAY
Qty: 30 TABLET | Refills: 0 | Status: SHIPPED | OUTPATIENT
Start: 2023-07-21

## 2023-07-21 RX ORDER — VALSARTAN 80 MG/1
80 TABLET ORAL DAILY
Qty: 30 TABLET | Refills: 0 | Status: SHIPPED | OUTPATIENT
Start: 2023-07-21 | End: 2023-08-07

## 2023-07-21 RX ORDER — METOPROLOL TARTRATE 50 MG/1
50 TABLET, FILM COATED ORAL 2 TIMES DAILY
Start: 2023-07-21

## 2023-07-21 RX ORDER — CEFUROXIME AXETIL 500 MG/1
500 TABLET ORAL 2 TIMES DAILY
Qty: 10 TABLET | Refills: 0 | Status: SHIPPED | OUTPATIENT
Start: 2023-07-21 | End: 2023-07-26

## 2023-07-21 RX ADMIN — INSULIN LISPRO 4 UNITS: 100 INJECTION, SOLUTION INTRAVENOUS; SUBCUTANEOUS at 11:55

## 2023-07-21 RX ADMIN — METOPROLOL TARTRATE 25 MG: 25 TABLET, FILM COATED ORAL at 08:19

## 2023-07-21 RX ADMIN — VALSARTAN 80 MG: 80 TABLET, FILM COATED ORAL at 08:13

## 2023-07-21 RX ADMIN — Medication 10 ML: at 08:14

## 2023-07-21 RX ADMIN — CLOPIDOGREL BISULFATE 75 MG: 75 TABLET ORAL at 08:13

## 2023-07-21 RX ADMIN — SODIUM CHLORIDE 1000 MG: 900 INJECTION INTRAVENOUS at 11:53

## 2023-07-21 RX ADMIN — ASPIRIN 325 MG: 325 TABLET ORAL at 08:13

## 2023-07-21 NOTE — DISCHARGE SUMMARY
[facility] Medicine Services  DISCHARGE SUMMARY    Patient Name: Dianne Barry  : 1945  MRN: [id number]    Date of Admission: 2023 11:44 PM  Date of Discharge:  2023  Primary Care Physician: Daron Dotson MD    Consults       Date and Time Order Name Status Description    2023 11:39 PM Inpatient Neurology Consult Stroke Completed             Hospital Course     Presenting Problem: dysarthria, mental status changes    Active Hospital Problems    Diagnosis  POA    **Dysarthria [R47.1]  Yes      Resolved Hospital Problems   No resolved problems to display.          Hospital Course:  Dianne Barry is a 78 y.o. female  with history of hypertension, type 2 diabetes hyperlipidemia, history of TIA who presented with dysarthria and mental status changes admitted for CVA rule out.  speech has returned to normal.  BP is significantly better. Testing has been negative. UA was collected on  and showed infection will start antibiotics.       Suspected CVA vs hypertensive emergency  Acute encephalopathy, ?  Hypertensive  History of prior TIA  -Patient with dysarthria, expressive aphasia  -CTA head does show moderate stenosis of the right MCA M1 segment, CT perfusion with no significant perfusion asymmetry, CT head is age-indeterminate and right basal ganglia infarct.  MRI is motion degraded but no infarct seen  -Stroke neurology following, work-up per protocol,   -- ECHO in march without bubble study  -- EEG negative for seizures; diffuse cerebral dysfunction of mild degree   -Transcranial l Doppler pending  -- pt will need holter monitor at dc   -Continue DAPT, statin  -- P2Y12 is 124, indicating adequate Plavix responsiveness.   -PT/OT/SLP to evaluated  -- stroke clinic follow up in 3 months     Hypertensive emergency  -BP significantly elevated on presentation, likely contributory to above  -Continue BP control,   -- goal bp < 140/80  -- restart home medications     UTI  --  urine culture >100,000 gram neg bacilli  -- s/p 2 doses of rocephin, will discharge on ceftin to complete a total of 7 days     Well-controlled type 2 diabetes A1c 7.6%  -Continue home meds      Hyperlipidemia  -- cont statin       Intracranial and extracranial atherosclerotic disease  -Right M1/M2 stenosis (asymptomatic) and bilateral carotid arteries  -Continue DAPT   -Continue statin     Patient has remained clinically stable and will be discharged home today     Discharge Follow Up Recommendations for outpatient labs/diagnostics:   Follow up with pcp one week   Follow up with stroke clinic 3 months     Day of Discharge     HPI:   Patient is resting in bed in NAD. She feels good. Symptoms have resolved. Plan for home today.     Review of Systems  Gen- No fevers, chills  CV- No chest pain, palpitations  Resp- No cough, dyspnea  GI- No N/V/D, abd pain      Vital Signs:   Temp:  [97.8 °F (36.6 °C)-98.5 °F (36.9 °C)] 98.5 °F (36.9 °C)  Heart Rate:  [64-91] 71  Resp:  [16-18] 16  BP: (139-172)/(71-95) 139/71  Flow (L/min):  [2] 2      Physical Exam:  Constitutional: No acute distress, awake, alert  HENT: NCAT, mucous membranes moist  Respiratory: Clear to auscultation bilaterally, respiratory effort normal room air 96%  Cardiovascular: RRR, no murmurs, rubs, or gallops  Gastrointestinal: Positive bowel sounds, soft, nontender, nondistended  Musculoskeletal: No bilateral ankle edema  Psychiatric: Appropriate affect, cooperative  Neurologic: Oriented x 3, strength symmetric in all extremities, Cranial Nerves grossly intact to confrontation, speech clear  Skin: No rashes       Pertinent  and/or Most Recent Results     LAB RESULTS:      Lab 07/19/23  0701 07/18/23  2354 07/18/23  2351   WBC 8.19 4.91  --    HEMOGLOBIN 12.7 12.1  --    HEMOGLOBIN, POC  --   --  12.9   HEMATOCRIT 38.0 38.7  --    HEMATOCRIT POC  --   --  38   PLATELETS 193 173  --    NEUTROS ABS 7.48* 3.49  --    IMMATURE GRANS (ABS) 0.04 0.07*  --     LYMPHS ABS 0.48* 0.77  --    MONOS ABS 0.17 0.47  --    EOS ABS 0.00 0.08  --    MCV 83.2 86.0  --    PROTIME  --   --  11.7*   APTT  --  30.5  --          Lab 07/19/23  0701 07/18/23  2351   SODIUM 133*  --    POTASSIUM 3.9  --    CHLORIDE 94*  --    CO2 23.0  --    ANION GAP 16.0*  --    BUN 14  --    CREATININE 0.72 0.90   EGFR 85.7 65.6   GLUCOSE 274*  --    CALCIUM 9.4  --    MAGNESIUM 1.7  --    HEMOGLOBIN A1C 7.60*  --          Lab 07/19/23  0701 07/18/23  2354   TOTAL PROTEIN 7.6  --    ALBUMIN 4.3  --    GLOBULIN 3.3  --    ALT (SGPT) 8 8   AST (SGOT) 16 15   BILIRUBIN 0.3  --    ALK PHOS 89  --          Lab 07/18/23  2354 07/18/23  2351   HSTROP T 8  --    PROTIME  --  11.7*   INR  --  1.0         Lab 07/19/23  0701   CHOLESTEROL 169   LDL CHOL 78   HDL CHOL 68*   TRIGLYCERIDES 132             Brief Urine Lab Results  (Last result in the past 365 days)        Color   Clarity   Blood   Leuk Est   Nitrite   Protein   CREAT   Urine HCG        07/20/23 1415 Yellow   Cloudy   Negative   Moderate (2+)   Positive   Negative                 Microbiology Results (last 10 days)       Procedure Component Value - Date/Time    Urine Culture - Urine, Urine, Clean Catch [[id number]]  (Abnormal) Collected: 07/20/23 1024    Lab Status: Preliminary result Specimen: Urine, Clean Catch Updated: 07/21/23 0948     Urine Culture >100,000 CFU/mL Gram Negative Bacilli    Narrative:      Colonization of the urinary tract without infection is common. Treatment is discouraged unless the patient is symptomatic, pregnant, or undergoing an invasive urologic procedure.            EEG    Result Date: 7/20/2023  Reason for referral: 78 y.o.female with speech changes, aphasia, consideration of seizure Technical Summary:  A 19 channel digital EEG was performed using the international 10-20 placement system, including eye leads and EKG leads. Duration: 22 minutes Findings: The awake tracing shows diffuse low amplitude 6 to 7 Hz theta present  symmetrically over both hemispheres.  A clear posterior rhythm is not seen.  EMG artifact is variably prominent.  IV pump artifact is present throughout.  Toward the latter portion of the study, artifact is present at F8 but the remainder of the study is of good technical quality.  Photic stimulation does not change the background.  Hyperventilation is not performed.  No focal features or epileptiform activity are seen. Video: Available Technical quality: Superior SUMMARY: Mild generalized slow No focal features or epileptiform activity are seen     Diffuse cerebral dysfunction of mild degree, nonspecific No evidence for epilepsy is seen This report is transcribed using the Dragon dictation system.      CT Angiogram Neck    Result Date: 7/19/2023  EXAMINATION: CT ANGIOGRAPHY HEAD AND NECK, CT PERFUSION DATE:  7/18/2023 INDICATION: Slurred speech. Aphasia. COMPARISON: Noncontrast CT head 7/18/2023 TECHNIQUE: CT angiography through the head and neck was performed in the axial plane using 115 mL of Isovue-370 administered intravenously, without adverse reaction. Coronal and sagittal MIP and MPR images were generated. CT perfusion imaging was performed and color maps were generated for cerebral blood flow, cerebral blood volume, mean transit time, time to drain, and Tmax. Rapid AI perfusion analysis was included.CT dose lowering techniques were used, to include: automated exposure control, adjustment for patient size, and or use of iterative reconstruction. Stenoses measured based on NASCET criteria. FINDINGS: CTA neck: Arch and great vessels: Ectasia and mild to moderate atherosclerotic calcification at the aortic arch. Great vessel origins are patent. Right carotid: The common carotid, internal carotid, and external carotid arteries are without occlusion, significant stenosis, or evidence of dissection. Atherosclerotic calcification at the carotid bifurcation and carotid bulb. Left carotid: The common carotid,  internal carotid, and external carotid arteries are without occlusion, significant stenosis, or evidence of dissection. Atherosclerotic calcification at the carotid bifurcation and carotid bulb. Vertebral arteries: The vertebral arteries are uniform in caliber, with no occlusion, significant stenosis, or evidence of dissection. Osseous and soft tissue structures: Minimal cervical lordotic reversal. Moderate disc height loss at C4-5. Severe disc height loss at C5-6 and C6-7. Multilevel facet and uncinate hypertrophy. Associated neural foraminal narrowing is most pronounced on the left at C4-5 and bilaterally at C5-6. CTA Head: Anterior Circulation: Mild atherosclerotic calcification of the internal carotid arteries. No occlusion or significant stenosis. Moderate stenosis at the right MCA M1 segment, with focal atherosclerotic calcification. Moderate stenoses in some of the right MCA M2 branches. No occlusion. Bilateral ACAs and left MCA are widely patent. Posterior Circulation: The vertebrobasilar system, superior cerebellar arteries, and posterior cerebral arteries are normal in caliber, with no occlusion, significant stenosis, or aneurysmal dilation. CT Perfusion: Blood vessel density: Recent blood vessel density in the right MCA territory (45-60% of normal). Qualitative color maps and column views: No focal deficit or significant perfusion asymmetry identified. Quantitative analysis: Volume of CBV < 34% 0 mL Volume of CBV < 38%: 0 mL Volume of CBV < 42%: 0 mL Volume of CBF< 20%: 0 mL Volume of CBF < 30%: 0 mL Volume of CBF < 34%: 0 mL Volume of CBF < 38%: 0 mL Volume of Tmax >10 seconds: 0 mL Volume of Tmax > 8 seconds: 0 mL Volume of Tmax > 6 seconds: 7 mL, involving bilateral anterior temporal lobes. Volume of Tmax > 4 seconds: 26 mL, involving bilateral temporal lobes, right greater than left cerebellar hemispheres, and patchy distribution in the periventricular white matter. This is probably artifact or  otherwise clinically insignificant. Mismatch: Cerebral blood flow of less than 30% of normal and Tmax of greater than 6 seconds is used for calculation of the mismatch volume ratio. Calculated mismatch of 7 mL, but likely insignificant given the distribution described above.     CTA NECK: 1. No occlusion, significant stenosis, or evidence of dissection in the cervical carotid or vertebral arteries. 2. Atherosclerotic calcification at the carotid bifurcations and carotid bulbs. 3. Cervical spine degenerative changes as described above. CTA HEAD: 1. Moderate stenoses of the right MCA M1 segment and some of the M2 branches. 2. Mild ICA atherosclerosis. 3. No large vessel occlusion. CT PERFUSION: 1. Reduced blood vessel density in the right MCA territory, likely secondary to the stenoses described above. 2. Patchy areas of elevated Tmax in bilateral cerebral hemispheres as described above, likely artifactual or otherwise clinically insignificant. No suspicious perfusion abnormality is identified. If there remains high suspicion for acute ischemic infarct, MRI would be more sensitive. Electronically signed by:  Henrique Olea M.D.  7/18/2023 10:58 PM Mountain Time    MRI Brain Without Contrast    Result Date: 7/19/2023  EXAMINATION: MRI BRAIN WO CONTRAST DATE: 7/19/2023 3:40 AM  HISTORY: Follow-up stroke COMPARISON: MRI of 3/6/2023. A and perfusion of 7/18/2023.  TECHNIQUE: Multiplanar, multisequence MR imaging of the brain was performed without intraveous contrast. FINDINGS: Image quality: Degraded by motion artifact  Ventricles/Extra-axial Spaces: Enlarged by global parenchymal volume loss. Parenchyma: Diffusion-weighted imaging shows no evidence for acute ischemia/infarction.   Patchy areas of T2 prolongation are present in the hemispheric white matter, statistically most likely from chronic small vessel ischemic changes. There are lacunar  infarcts and/or prominent perivascular spaces in the basal ganglia and  thalami. No gross mass effect or midline shift.  No gross midline structural abnormalities are detected on sagittal imaging. Intracranial Hemorrhage: None detected. Bones/Extracranial soft tissues: There is normal marrow signal in the skull base.  Visualized Sinuses/Mastoids: Trace mastoid fluid, likely incidental. Vascular:  Arterial flow voids at the level of the skull base are within normal limits.      1.  Motion artifact degraded exam. 2.  No acute intracranial process detected. 3.  Volume loss and mild chronic small vessel ischemic changes.            Electronically signed by:  Oral Boyce M.D.  7/19/2023 2:54 AM Mountain Time    XR Chest 1 View    Result Date: 7/19/2023  EXAM:  XR CHEST 1 VW   DATE: 7/19/2023 12:03 AM HISTORY:  Acute Stroke Protocol (onset < 12 hrs) COMPARISON:  9/30/2017, 9/28/2017 FINDINGS and     1.  Right lower lung bilobed nodular density 2.2 x 1.1 cm. May have been present previously, it is more conspicuous now. Consider calcifications. Recommend comparison with priors since 2017. If not available, consider CT chest without contrast within 2 weeks. 2.  Heart size is normal. 3.  No acute bony findings. Electronically signed by:  Lynda Agrawal M.D.  7/18/2023 11:26 PM Mountain Time    CT Head Without Contrast Stroke Protocol    Result Date: 7/19/2023  EXAMINATION: CT HEAD WITHOUT CONTRAST DATE: 7/18/2023 11:40 PM  INDICATION: STROKE ALERT, HEADACHE, APHASIA  COMPARISON: 2/21/23.  TECHNIQUE:  Routine axial images through the head without contrast. Coronal reformations. Low-dose CT acquisition technique included one or more of the following options: 1. Automated exposure control, 2. Adjustment of mA and/or KV according to patient's size and/or 3. Use of iterative reconstruction. FINDINGS:  Intracranial contents: New hypodensity right basal ganglia measuring 6 x 8 mm. Ventricular and cisternal spaces are prominent from volume loss. Mild periventricular and subcortical white matter  hypodensities. No dominant mass, midline shift, hydrocephalus, extra axial fluid collection or acute hemorrhage. No asymmetric hyperdensity of the proximal major cerebral arteries. Craniocervical junction is patent. Bones and extracranial soft tissues: Mild mucosal thickening ethmoid air cells. Otherwise, Visualized paranasal sinuses and mastoid air cells are clear.  Skull is intact. Visualized intraorbital contents are unremarkable.     1. No acute intracranial hemorrhage. Age indeterminate infarct right basal ganglia. MRI is more sensitive for the detection of acute nonhemorrhagic infarct. . 2. Mild changes small vessel ischemic disease of indeterminate age, presumably mostly chronic. Volume loss. Atherosclerosis. Report called to LAUREN PADILLA at 7/18/2023 9:55 PM Electronically signed by:  Ranjith Bo M.D.  7/18/2023 10:04 PM Mountain Time    XR Abdomen KUB    Result Date: 7/19/2023  EXAMINATION:   XR ABDOMEN KUB INDICATION:  Clearance for MRI COMPARISON: None available.     Right upper quadrant surgical clips are present. Otherwise no metallic foreign body identified. Contrast opacifies the bladder. Thank you for the referral of this patient. This exam was interpreted by an American Board of Radiology certified radiologist with subspecialty fellowship training in body imaging.   If there are any questions regarding this exam please feel free to contact a radiologist directly at [phone]. Electronically signed by:  Whit Walker M.D.  7/19/2023 1:21 AM Mountain Time    CT Angiogram Head w AI Analysis of LVO    Result Date: 7/19/2023  EXAMINATION: CT ANGIOGRAPHY HEAD AND NECK, CT PERFUSION DATE:  7/18/2023 INDICATION: Slurred speech. Aphasia. COMPARISON: Noncontrast CT head 7/18/2023 TECHNIQUE: CT angiography through the head and neck was performed in the axial plane using 115 mL of Isovue-370 administered intravenously, without adverse reaction. Coronal and sagittal MIP and MPR images were  generated. CT perfusion imaging was performed and color maps were generated for cerebral blood flow, cerebral blood volume, mean transit time, time to drain, and Tmax. Rapid AI perfusion analysis was included.CT dose lowering techniques were used, to include: automated exposure control, adjustment for patient size, and or use of iterative reconstruction. Stenoses measured based on NASCET criteria. FINDINGS: CTA neck: Arch and great vessels: Ectasia and mild to moderate atherosclerotic calcification at the aortic arch. Great vessel origins are patent. Right carotid: The common carotid, internal carotid, and external carotid arteries are without occlusion, significant stenosis, or evidence of dissection. Atherosclerotic calcification at the carotid bifurcation and carotid bulb. Left carotid: The common carotid, internal carotid, and external carotid arteries are without occlusion, significant stenosis, or evidence of dissection. Atherosclerotic calcification at the carotid bifurcation and carotid bulb. Vertebral arteries: The vertebral arteries are uniform in caliber, with no occlusion, significant stenosis, or evidence of dissection. Osseous and soft tissue structures: Minimal cervical lordotic reversal. Moderate disc height loss at C4-5. Severe disc height loss at C5-6 and C6-7. Multilevel facet and uncinate hypertrophy. Associated neural foraminal narrowing is most pronounced on the left at C4-5 and bilaterally at C5-6. CTA Head: Anterior Circulation: Mild atherosclerotic calcification of the internal carotid arteries. No occlusion or significant stenosis. Moderate stenosis at the right MCA M1 segment, with focal atherosclerotic calcification. Moderate stenoses in some of the right MCA M2 branches. No occlusion. Bilateral ACAs and left MCA are widely patent. Posterior Circulation: The vertebrobasilar system, superior cerebellar arteries, and posterior cerebral arteries are normal in caliber, with no occlusion,  significant stenosis, or aneurysmal dilation. CT Perfusion: Blood vessel density: Recent blood vessel density in the right MCA territory (45-60% of normal). Qualitative color maps and column views: No focal deficit or significant perfusion asymmetry identified. Quantitative analysis: Volume of CBV < 34% 0 mL Volume of CBV < 38%: 0 mL Volume of CBV < 42%: 0 mL Volume of CBF< 20%: 0 mL Volume of CBF < 30%: 0 mL Volume of CBF < 34%: 0 mL Volume of CBF < 38%: 0 mL Volume of Tmax >10 seconds: 0 mL Volume of Tmax > 8 seconds: 0 mL Volume of Tmax > 6 seconds: 7 mL, involving bilateral anterior temporal lobes. Volume of Tmax > 4 seconds: 26 mL, involving bilateral temporal lobes, right greater than left cerebellar hemispheres, and patchy distribution in the periventricular white matter. This is probably artifact or otherwise clinically insignificant. Mismatch: Cerebral blood flow of less than 30% of normal and Tmax of greater than 6 seconds is used for calculation of the mismatch volume ratio. Calculated mismatch of 7 mL, but likely insignificant given the distribution described above.     CTA NECK: 1. No occlusion, significant stenosis, or evidence of dissection in the cervical carotid or vertebral arteries. 2. Atherosclerotic calcification at the carotid bifurcations and carotid bulbs. 3. Cervical spine degenerative changes as described above. CTA HEAD: 1. Moderate stenoses of the right MCA M1 segment and some of the M2 branches. 2. Mild ICA atherosclerosis. 3. No large vessel occlusion. CT PERFUSION: 1. Reduced blood vessel density in the right MCA territory, likely secondary to the stenoses described above. 2. Patchy areas of elevated Tmax in bilateral cerebral hemispheres as described above, likely artifactual or otherwise clinically insignificant. No suspicious perfusion abnormality is identified. If there remains high suspicion for acute ischemic infarct, MRI would be more sensitive. Electronically signed by:  Henrique  ABHINAV Olea  7/18/2023 10:58 PM Mountain Time    CT CEREBRAL PERFUSION WITH & WITHOUT CONTRAST    Result Date: 7/19/2023  EXAMINATION: CT ANGIOGRAPHY HEAD AND NECK, CT PERFUSION DATE:  7/18/2023 INDICATION: Slurred speech. Aphasia. COMPARISON: Noncontrast CT head 7/18/2023 TECHNIQUE: CT angiography through the head and neck was performed in the axial plane using 115 mL of Isovue-370 administered intravenously, without adverse reaction. Coronal and sagittal MIP and MPR images were generated. CT perfusion imaging was performed and color maps were generated for cerebral blood flow, cerebral blood volume, mean transit time, time to drain, and Tmax. Rapid AI perfusion analysis was included.CT dose lowering techniques were used, to include: automated exposure control, adjustment for patient size, and or use of iterative reconstruction. Stenoses measured based on NASCET criteria. FINDINGS: CTA neck: Arch and great vessels: Ectasia and mild to moderate atherosclerotic calcification at the aortic arch. Great vessel origins are patent. Right carotid: The common carotid, internal carotid, and external carotid arteries are without occlusion, significant stenosis, or evidence of dissection. Atherosclerotic calcification at the carotid bifurcation and carotid bulb. Left carotid: The common carotid, internal carotid, and external carotid arteries are without occlusion, significant stenosis, or evidence of dissection. Atherosclerotic calcification at the carotid bifurcation and carotid bulb. Vertebral arteries: The vertebral arteries are uniform in caliber, with no occlusion, significant stenosis, or evidence of dissection. Osseous and soft tissue structures: Minimal cervical lordotic reversal. Moderate disc height loss at C4-5. Severe disc height loss at C5-6 and C6-7. Multilevel facet and uncinate hypertrophy. Associated neural foraminal narrowing is most pronounced on the left at C4-5 and bilaterally at C5-6. CTA Head:  Anterior Circulation: Mild atherosclerotic calcification of the internal carotid arteries. No occlusion or significant stenosis. Moderate stenosis at the right MCA M1 segment, with focal atherosclerotic calcification. Moderate stenoses in some of the right MCA M2 branches. No occlusion. Bilateral ACAs and left MCA are widely patent. Posterior Circulation: The vertebrobasilar system, superior cerebellar arteries, and posterior cerebral arteries are normal in caliber, with no occlusion, significant stenosis, or aneurysmal dilation. CT Perfusion: Blood vessel density: Recent blood vessel density in the right MCA territory (45-60% of normal). Qualitative color maps and column views: No focal deficit or significant perfusion asymmetry identified. Quantitative analysis: Volume of CBV < 34% 0 mL Volume of CBV < 38%: 0 mL Volume of CBV < 42%: 0 mL Volume of CBF< 20%: 0 mL Volume of CBF < 30%: 0 mL Volume of CBF < 34%: 0 mL Volume of CBF < 38%: 0 mL Volume of Tmax >10 seconds: 0 mL Volume of Tmax > 8 seconds: 0 mL Volume of Tmax > 6 seconds: 7 mL, involving bilateral anterior temporal lobes. Volume of Tmax > 4 seconds: 26 mL, involving bilateral temporal lobes, right greater than left cerebellar hemispheres, and patchy distribution in the periventricular white matter. This is probably artifact or otherwise clinically insignificant. Mismatch: Cerebral blood flow of less than 30% of normal and Tmax of greater than 6 seconds is used for calculation of the mismatch volume ratio. Calculated mismatch of 7 mL, but likely insignificant given the distribution described above.     CTA NECK: 1. No occlusion, significant stenosis, or evidence of dissection in the cervical carotid or vertebral arteries. 2. Atherosclerotic calcification at the carotid bifurcations and carotid bulbs. 3. Cervical spine degenerative changes as described above. CTA HEAD: 1. Moderate stenoses of the right MCA M1 segment and some of the M2 branches. 2. Mild ICA  atherosclerosis. 3. No large vessel occlusion. CT PERFUSION: 1. Reduced blood vessel density in the right MCA territory, likely secondary to the stenoses described above. 2. Patchy areas of elevated Tmax in bilateral cerebral hemispheres as described above, likely artifactual or otherwise clinically insignificant. No suspicious perfusion abnormality is identified. If there remains high suspicion for acute ischemic infarct, MRI would be more sensitive. Electronically signed by:  Henrique Olea M.D.  7/18/2023 10:58 PM Mountain Time     Results for orders placed during the hospital encounter of 03/02/23    Duplex carotid ultrasound CAR    Interpretation Summary    Right internal carotid artery stenosis of 0-49%.    Left internal carotid artery stenosis of 0-49%.    There is antegrade flow in the vertebral arteries bilaterally.      Results for orders placed during the hospital encounter of 03/02/23    Duplex carotid ultrasound CAR    Interpretation Summary    Right internal carotid artery stenosis of 0-49%.    Left internal carotid artery stenosis of 0-49%.    There is antegrade flow in the vertebral arteries bilaterally.      Results for orders placed during the hospital encounter of 03/02/23    Adult Transthoracic Echo Complete W/ Cont if Necessary Per Protocol    Interpretation Summary    Left ventricular systolic function is normal. Left ventricular ejection fraction appears to be 56 - 60%.    Mild aortic valve regurgitation is present.    Mild tricuspid valve regurgitation is present.    Mild dilation of the ascending aorta is present at 4.2 cm.      Plan for Follow-up of Pending Labs/Results:   Pending Labs       Order Current Status    Urine Culture - Urine, Urine, Clean Catch Preliminary result          Discharge Details        Discharge Medications        New Medications        Instructions Start Date   cefuroxime 500 MG tablet  Commonly known as: CEFTIN   500 mg, Oral, 2 Times Daily             Changes to  Medications        Instructions Start Date   rosuvastatin 10 MG tablet  Commonly known as: CRESTOR  What changed:   medication strength  how much to take   10 mg, Oral, Every Other Day, At night             Continue These Medications        Instructions Start Date   aspirin 81 MG EC tablet   81 mg, Oral, Every Morning, OTC      Biotin 5 MG tablet   1 capsule, Oral, Daily, OTC      Cinnamon 500 MG tablet   2 capsules, Oral, Daily, OTC      clopidogrel 75 MG tablet  Commonly known as: PLAVIX   75 mg, Oral, Daily      estradiol 1 MG tablet  Commonly known as: ESTRACE   1 tablet, Oral, Daily      First-Testosterone 2 % ointment   1 application , Transdermal, Every Morning, PLACES ON INNER THIGH       fish oil 1000 MG capsule capsule   2,000 mg, Oral, Daily With Breakfast, OTC      glimepiride 1 MG tablet  Commonly known as: AMARYL   1 mg, Oral, Daily      meclizine 25 MG chewable tablet chewable tablet   25 mg, Oral, As Needed, OTC      meloxicam 15 MG tablet  Commonly known as: MOBIC   15 mg, Oral, Daily      metoprolol tartrate 50 MG tablet  Commonly known as: LOPRESSOR   50 mg, Oral, 2 Times Daily      pantoprazole 20 MG EC tablet  Commonly known as: PROTONIX   20 mg, Oral, 2 Times Daily      valsartan 80 MG tablet  Commonly known as: DIOVAN   80 mg, Oral, Daily, NEEDS APPOINTMENT FOR FUTURE REFILLS               Allergies   Allergen Reactions    Darvon [Propoxyphene] Hallucinations     And DARVOCET    Atorvastatin Myalgia         Discharge Disposition:  Home or Self Care    Diet:  Hospital:  Diet Order   Procedures    Diet: Regular/House Diet, Cardiac Diets, Diabetic Diets; Healthy Heart (2-3 Na+); Consistent Carbohydrate; Texture: Regular Texture (IDDSI 7); Fluid Consistency: Thin (IDDSI 0)       Activity:  Activity Instructions       Activity as Tolerated      Measure Blood Pressure      Measure Weight              Restrictions or Other Recommendations:         CODE STATUS:    Code Status and Medical  Interventions:   Ordered at: 07/19/23 0504     Code Status (Patient has no pulse and is not breathing):    CPR (Attempt to Resuscitate)     Medical Interventions (Patient has pulse or is breathing):    Full Support     Comments:    d/w daughters     Release to patient:    Routine Release       Future Appointments   Date Time Provider Department Center   7/26/2023 11:20 AM [facility] [place]   8/9/2023  1:30 PM [facility] 1 [facility]   10/3/2023 10:00 AM [facility]       Additional Instructions for the Follow-ups that You Need to Schedule       Discharge Follow-up with PCP   As directed       Currently Documented PCP:    Daron Dotson MD    PCP Phone Number:    [phone]     Follow Up Details: follow up with pcp one week         Discharge Follow-up with Specified Provider: follow up with stroke clinic 3 months   As directed      To: follow up with stroke clinic 3 months                       KEISHA Lee  07/21/23      Time Spent on Discharge:  I spent  45  minutes on this discharge activity which included: face-to-face encounter with the patient, reviewing the data in the system, coordination of the care with the nursing staff as well as consultants, documentation, and entering orders.

## 2023-07-22 LAB — BACTERIA SPEC AEROBE CULT: ABNORMAL

## 2023-07-26 ENCOUNTER — HOSPITAL ENCOUNTER (OUTPATIENT)
Dept: BONE DENSITY | Facility: HOSPITAL | Age: 78
Discharge: HOME | End: 2023-07-26
Payer: MEDICARE

## 2023-07-26 ENCOUNTER — HOSPITAL ENCOUNTER (OUTPATIENT)
Dept: MAMMOGRAPHY | Facility: HOSPITAL | Age: 78
Discharge: HOME | End: 2023-07-26
Payer: MEDICARE

## 2023-07-26 DIAGNOSIS — Z12.31 SCREENING MAMMOGRAM FOR BREAST CANCER: ICD-10-CM

## 2023-07-26 DIAGNOSIS — Z78.0 MENOPAUSE: ICD-10-CM

## 2023-07-26 PROCEDURE — 77067 SCR MAMMO BI INCL CAD: CPT

## 2023-07-26 PROCEDURE — 77063 BREAST TOMOSYNTHESIS BI: CPT

## 2023-07-27 LAB
BH CV VAS TCD LEFT DISTAL M1: 50 CM/SEC
BH CV VAS TCD LEFT MID M1: 58 CM/SEC
BH CV VAS TCD LEFT PROXIMAL M1: 49 CM/SEC
BH CV VAS TCD LEFT TERMINAL ICA: 31 CM/SEC
BH CV VAS TCD RIGHT DISTAL M1: 27 CM/SEC
BH CV VAS TCD RIGHT MID M1: 40 CM/SEC
BH CV VAS TCD RIGHT PROXIMAL M1: 24 CM/SEC
BH CV VAS TCD RIGHT TERMINAL ICA: 16 CM/SEC

## 2023-08-07 RX ORDER — VALSARTAN 80 MG/1
80 TABLET ORAL DAILY
Qty: 60 TABLET | Refills: 0 | Status: SHIPPED | OUTPATIENT
Start: 2023-08-07

## 2023-08-08 RX ORDER — VALSARTAN 80 MG/1
TABLET ORAL
Qty: 60 TABLET | Refills: 10 | OUTPATIENT
Start: 2023-08-08

## 2023-09-06 ENCOUNTER — HOSPITAL ENCOUNTER (OUTPATIENT)
Dept: BONE DENSITY | Facility: HOSPITAL | Age: 78
Discharge: HOME | End: 2023-09-06
Admitting: FAMILY MEDICINE
Payer: MEDICARE

## 2023-09-06 PROCEDURE — 77080 DXA BONE DENSITY AXIAL: CPT

## 2023-11-14 RX ORDER — VALSARTAN 80 MG/1
TABLET ORAL
Qty: 60 TABLET | Refills: 0 | Status: SHIPPED | OUTPATIENT
Start: 2023-11-14

## 2024-01-22 RX ORDER — VALSARTAN 80 MG/1
TABLET ORAL
Qty: 30 TABLET | Refills: 0 | Status: SHIPPED | OUTPATIENT
Start: 2024-01-22

## 2024-06-24 ENCOUNTER — TRANSCRIBE ORDERS (OUTPATIENT)
Dept: ADMINISTRATIVE | Facility: HOSPITAL | Age: 79
End: 2024-06-24
Payer: MEDICARE

## 2024-06-24 DIAGNOSIS — Z12.31 SCREENING MAMMOGRAM FOR BREAST CANCER: Primary | ICD-10-CM

## 2024-07-30 ENCOUNTER — HOSPITAL ENCOUNTER (OUTPATIENT)
Dept: MAMMOGRAPHY | Facility: HOSPITAL | Age: 79
Discharge: HOME | End: 2024-07-30
Admitting: NURSE PRACTITIONER
Payer: MEDICARE

## 2024-07-30 DIAGNOSIS — Z12.31 SCREENING MAMMOGRAM FOR BREAST CANCER: ICD-10-CM

## 2024-07-30 PROCEDURE — 77067 SCR MAMMO BI INCL CAD: CPT

## 2024-07-30 PROCEDURE — 77063 BREAST TOMOSYNTHESIS BI: CPT

## 2025-04-28 ENCOUNTER — HOSPITAL ENCOUNTER (EMERGENCY)
Facility: HOSPITAL | Age: 80
Discharge: HOME | End: 2025-04-28
Attending: EMERGENCY MEDICINE | Admitting: EMERGENCY MEDICINE
Payer: MEDICARE

## 2025-04-28 ENCOUNTER — APPOINTMENT (OUTPATIENT)
Dept: CT IMAGING | Facility: HOSPITAL | Age: 80
End: 2025-04-28
Payer: MEDICARE

## 2025-04-28 VITALS
TEMPERATURE: 97.6 F | HEIGHT: 65 IN | OXYGEN SATURATION: 90 % | WEIGHT: 160 LBS | RESPIRATION RATE: 16 BRPM | SYSTOLIC BLOOD PRESSURE: 186 MMHG | BODY MASS INDEX: 26.66 KG/M2 | DIASTOLIC BLOOD PRESSURE: 99 MMHG | HEART RATE: 59 BPM

## 2025-04-28 DIAGNOSIS — M54.50 CHRONIC MIDLINE LOW BACK PAIN WITHOUT SCIATICA: Primary | ICD-10-CM

## 2025-04-28 DIAGNOSIS — B37.31 VULVOVAGINAL CANDIDIASIS: ICD-10-CM

## 2025-04-28 DIAGNOSIS — G89.29 CHRONIC MIDLINE LOW BACK PAIN WITHOUT SCIATICA: Primary | ICD-10-CM

## 2025-04-28 DIAGNOSIS — M51.360 DEGENERATION OF INTERVERTEBRAL DISC OF LUMBAR REGION WITH DISCOGENIC BACK PAIN: ICD-10-CM

## 2025-04-28 LAB
ALBUMIN SERPL-MCNC: 4.1 G/DL (ref 3.5–5.2)
ALBUMIN/GLOB SERPL: 1.4 G/DL
ALP SERPL-CCNC: 66 U/L (ref 39–117)
ALT SERPL W P-5'-P-CCNC: 9 U/L (ref 1–33)
ANION GAP SERPL CALCULATED.3IONS-SCNC: 13 MMOL/L (ref 5–15)
AST SERPL-CCNC: 14 U/L (ref 1–32)
BACTERIA UR QL AUTO: ABNORMAL /HPF
BASOPHILS # BLD AUTO: 0.03 10*3/MM3 (ref 0–0.2)
BASOPHILS NFR BLD AUTO: 0.6 % (ref 0–1.5)
BILIRUB SERPL-MCNC: 0.3 MG/DL (ref 0–1.2)
BILIRUB UR QL STRIP: NEGATIVE
BUN SERPL-MCNC: 23 MG/DL (ref 8–23)
BUN/CREAT SERPL: 28.4 (ref 7–25)
CALCIUM SPEC-SCNC: 9.2 MG/DL (ref 8.6–10.5)
CHLORIDE SERPL-SCNC: 98 MMOL/L (ref 98–107)
CLARITY UR: ABNORMAL
CO2 SERPL-SCNC: 27 MMOL/L (ref 22–29)
COLOR UR: YELLOW
CREAT SERPL-MCNC: 0.81 MG/DL (ref 0.57–1)
DEPRECATED RDW RBC AUTO: 38.7 FL (ref 37–54)
EGFRCR SERPLBLD CKD-EPI 2021: 73.9 ML/MIN/1.73
EOSINOPHIL # BLD AUTO: 0.07 10*3/MM3 (ref 0–0.4)
EOSINOPHIL NFR BLD AUTO: 1.4 % (ref 0.3–6.2)
ERYTHROCYTE [DISTWIDTH] IN BLOOD BY AUTOMATED COUNT: 12.4 % (ref 12.3–15.4)
GLOBULIN UR ELPH-MCNC: 2.9 GM/DL
GLUCOSE SERPL-MCNC: 204 MG/DL (ref 65–99)
GLUCOSE UR STRIP-MCNC: ABNORMAL MG/DL
HCT VFR BLD AUTO: 40.1 % (ref 34–46.6)
HGB BLD-MCNC: 13 G/DL (ref 12–15.9)
HGB UR QL STRIP.AUTO: NEGATIVE
HYALINE CASTS UR QL AUTO: ABNORMAL /LPF
IMM GRANULOCYTES # BLD AUTO: 0.03 10*3/MM3 (ref 0–0.05)
IMM GRANULOCYTES NFR BLD AUTO: 0.6 % (ref 0–0.5)
KETONES UR QL STRIP: ABNORMAL
LEUKOCYTE ESTERASE UR QL STRIP.AUTO: ABNORMAL
LIPASE SERPL-CCNC: 30 U/L (ref 13–60)
LYMPHOCYTES # BLD AUTO: 1.13 10*3/MM3 (ref 0.7–3.1)
LYMPHOCYTES NFR BLD AUTO: 22.3 % (ref 19.6–45.3)
MCH RBC QN AUTO: 27.8 PG (ref 26.6–33)
MCHC RBC AUTO-ENTMCNC: 32.4 G/DL (ref 31.5–35.7)
MCV RBC AUTO: 85.9 FL (ref 79–97)
MONOCYTES # BLD AUTO: 0.43 10*3/MM3 (ref 0.1–0.9)
MONOCYTES NFR BLD AUTO: 8.5 % (ref 5–12)
NEUTROPHILS NFR BLD AUTO: 3.37 10*3/MM3 (ref 1.7–7)
NEUTROPHILS NFR BLD AUTO: 66.6 % (ref 42.7–76)
NITRITE UR QL STRIP: NEGATIVE
NRBC BLD AUTO-RTO: 0 /100 WBC (ref 0–0.2)
PH UR STRIP.AUTO: 5.5 [PH] (ref 5–8)
PLATELET # BLD AUTO: 157 10*3/MM3 (ref 140–450)
PMV BLD AUTO: 10.6 FL (ref 6–12)
POTASSIUM SERPL-SCNC: 3.9 MMOL/L (ref 3.5–5.2)
PROT SERPL-MCNC: 7 G/DL (ref 6–8.5)
PROT UR QL STRIP: ABNORMAL
RBC # BLD AUTO: 4.67 10*6/MM3 (ref 3.77–5.28)
RBC # UR STRIP: ABNORMAL /HPF
REF LAB TEST METHOD: ABNORMAL
SODIUM SERPL-SCNC: 138 MMOL/L (ref 136–145)
SP GR UR STRIP: 1.02 (ref 1–1.03)
SQUAMOUS #/AREA URNS HPF: ABNORMAL /HPF
UROBILINOGEN UR QL STRIP: ABNORMAL
WBC # UR STRIP: ABNORMAL /HPF
WBC NRBC COR # BLD AUTO: 5.06 10*3/MM3 (ref 3.4–10.8)
YEAST URNS QL MICRO: ABNORMAL /HPF

## 2025-04-28 PROCEDURE — 74177 CT ABD & PELVIS W/CONTRAST: CPT

## 2025-04-28 PROCEDURE — 81001 URINALYSIS AUTO W/SCOPE: CPT

## 2025-04-28 PROCEDURE — 36415 COLL VENOUS BLD VENIPUNCTURE: CPT

## 2025-04-28 PROCEDURE — 85025 COMPLETE CBC W/AUTO DIFF WBC: CPT

## 2025-04-28 PROCEDURE — 99285 EMERGENCY DEPT VISIT HI MDM: CPT

## 2025-04-28 PROCEDURE — 80053 COMPREHEN METABOLIC PANEL: CPT

## 2025-04-28 PROCEDURE — 83690 ASSAY OF LIPASE: CPT

## 2025-04-28 PROCEDURE — 25510000001 IOPAMIDOL 61 % SOLUTION: Performed by: EMERGENCY MEDICINE

## 2025-04-28 RX ORDER — HYDROCODONE BITARTRATE AND ACETAMINOPHEN 5; 325 MG/1; MG/1
1 TABLET ORAL ONCE
Refills: 0 | Status: COMPLETED | OUTPATIENT
Start: 2025-04-28 | End: 2025-04-28

## 2025-04-28 RX ORDER — LIDOCAINE 4 G/G
1 PATCH TOPICAL ONCE
Status: DISCONTINUED | OUTPATIENT
Start: 2025-04-28 | End: 2025-04-28 | Stop reason: HOSPADM

## 2025-04-28 RX ORDER — IOPAMIDOL 612 MG/ML
85 INJECTION, SOLUTION INTRAVASCULAR
Status: COMPLETED | OUTPATIENT
Start: 2025-04-28 | End: 2025-04-28

## 2025-04-28 RX ORDER — FLUCONAZOLE 150 MG/1
150 TABLET ORAL ONCE
Status: COMPLETED | OUTPATIENT
Start: 2025-04-28 | End: 2025-04-28

## 2025-04-28 RX ORDER — LIDOCAINE 50 MG/G
1 PATCH TOPICAL EVERY 24 HOURS
Qty: 14 PATCH | Refills: 0 | Status: SHIPPED | OUTPATIENT
Start: 2025-04-28

## 2025-04-28 RX ORDER — SODIUM CHLORIDE 0.9 % (FLUSH) 0.9 %
10 SYRINGE (ML) INJECTION AS NEEDED
Status: DISCONTINUED | OUTPATIENT
Start: 2025-04-28 | End: 2025-04-28 | Stop reason: HOSPADM

## 2025-04-28 RX ADMIN — FLUCONAZOLE 150 MG: 150 TABLET ORAL at 17:42

## 2025-04-28 RX ADMIN — LIDOCAINE 1 PATCH: 4 PATCH TOPICAL at 17:40

## 2025-04-28 RX ADMIN — IOPAMIDOL 85 ML: 612 INJECTION, SOLUTION INTRAVENOUS at 16:01

## 2025-04-28 RX ADMIN — HYDROCODONE BITARTRATE AND ACETAMINOPHEN 1 TABLET: 5; 325 TABLET ORAL at 17:42

## 2025-04-28 NOTE — DISCHARGE INSTRUCTIONS
Follow-up with your primary care provider if needed.  Follow-up with the orthopedic spinal specialist to have your degenerative disc specifically lumbar 4 and lumbar 5 evaluated for recommendations.  Wear the lidocaine patch on 12 hours, then off 12 hours.  You can also use Voltaren gel, available over-the-counter.

## 2025-04-28 NOTE — ED PROVIDER NOTES
Subjective   History of Present Illness  Patient is a 79-year-old lady who recounts approximately 4 weeks of urinary changes and suspicion of UTI.  She is on her second course of antibiotics, completed 5 days of cephalosporin, is now taking Augmentin from [facility].  She reported noticing her urine was a deeper yellow or orange even at the early part of the month, and is developed significant low back pain that radiates bilaterally.  In addition she reports some suprapubic pressure.  She reports in the past when she gets UTIs she is not symptomatic.  She reports prior colon surgery in .    Review of Systems   Constitutional: Negative.    Respiratory: Negative.     Cardiovascular: Negative.    Gastrointestinal:  Positive for abdominal pain.   Genitourinary:  Positive for pelvic pain.   Musculoskeletal:  Positive for back pain.   Skin: Negative.    Neurological: Negative.        Past Medical History:   Diagnosis Date    Arthritis     Bacterial UTI 2017    HOSPITALIZED FOR SEPSIS    Cancer     Cataracts, bilateral     Chronic diastolic congestive heart failure 2018    Coronary artery disease involving native coronary artery of native heart without angina pectoris 2018    Diabetes mellitus     Gall stones     Hip fracture, right     History of transfusion     Hypertension        Allergies   Allergen Reactions    Darvon [Propoxyphene] Hallucinations     And DARVOCET    Atorvastatin Myalgia       Past Surgical History:   Procedure Laterality Date    ABDOMINAL SURGERY      CARDIAC CATHETERIZATION N/A 10/17/2017    Procedure: Left Heart Cath;  Surgeon: Oral Velazco MD;  Location: [facility] INVASIVE LOCATION;  Service:     CATARACT EXTRACTION       SECTION      COLON SURGERY      COLONOSCOPY      CYSTOSCOPY      EYE SURGERY      FRACTURE SURGERY      HYSTERECTOMY      MULTIPLE TOOTH EXTRACTIONS      OOPHORECTOMY      TUBAL ABDOMINAL LIGATION         Family History   Problem Relation Age of Onset     Heart disease Mother     Hyperlipidemia Mother     Hypertension Mother     Diabetes Mother     Heart attack Mother     Heart attack Father     Depression Other     Breast cancer Neg Hx     Ovarian cancer Neg Hx        Social History     Socioeconomic History    Marital status:    Tobacco Use    Smoking status: Former     Types: Cigarettes     Passive exposure: Never    Smokeless tobacco: Never   Vaping Use    Vaping status: Never Used   Substance and Sexual Activity    Alcohol use: Not Currently     Alcohol/week: 1.0 standard drink of alcohol     Types: 1 Drinks containing 0.5 oz of alcohol per week     Comment: 4 times per month, 1 drink    Drug use: No    Sexual activity: Defer           Objective   Physical Exam  Constitutional:       Appearance: Normal appearance.   HENT:      Head: Normocephalic and atraumatic.   Cardiovascular:      Rate and Rhythm: Normal rate.      Pulses: Normal pulses.   Pulmonary:      Effort: Pulmonary effort is normal.   Abdominal:      General: Abdomen is flat. Bowel sounds are normal.      Palpations: Abdomen is soft.      Tenderness: There is abdominal tenderness in the suprapubic area. There is no right CVA tenderness or left CVA tenderness.   Musculoskeletal:         General: Tenderness present. Normal range of motion.      Lumbar back: Tenderness present.   Skin:     General: Skin is warm and dry.      Capillary Refill: Capillary refill takes less than 2 seconds.   Neurological:      General: No focal deficit present.      Mental Status: She is alert and oriented to person, place, and time.         Procedures           ED Course  ED Course as of 04/28/25 1710   Mon Apr 28, 2025   1357 Patient initially located in ED waiting room, ambulates slowly to the pit area for initial evaluation. [JH]   1531 CBC within normal limits, serum chemistry with hyperglycemia similar to previous.  Urinalysis without bacteria seen, some contamination, small amount yeast noted.  Patient's been  "on 2 courses of antibiotics this month. []   1637 CT imaging of abdomen/pelvis without acute abdominal pelvic findings.  Patient does have known degenerative disc disease specially lumbar 4 on 5 anterolisthesis that is unchanged.  Will consider appropriate medication for musculoskeletal back pain, as well as a dose of Diflucan. []   1708 I reevaluated the patient.  She now discloses that she did not take her typical medicines today including her blood pressure medicine, stating, \"they always tell me not to take my medicines before I come to the hospital or the doctor.\"  Thus, her elevated blood pressures. []      ED Course User Index  [] Chauncey Issa APRN                                                       Medical Decision Making  Given the patient's complaints, ongoing for several weeks, prior history, and my exam, differential cannot exclude urinary tract infection, urosepsis, pyelonephritis, renal calculi, diverticulitis, colitis, bowel obstruction, other abdominal pathology, in addition to musculoskeletal low back pain, sciatica.  Patient will have urinalysis, serum screening labs, CT imaging abdomen pelvis with IV contrast if appropriate.  We will reevaluate, communicate her workup results and consider her disposition including consultation or referral as indicated.  Patient is agreeable with this plan.    Amount and/or Complexity of Data Reviewed  Labs: ordered.    Risk  Prescription drug management.        Final diagnoses:   Chronic midline low back pain without sciatica   Degeneration of intervertebral disc of lumbar region with discogenic back pain   Vulvovaginal candidiasis       ED Disposition  ED Disposition       ED Disposition   Discharge    Condition   Stable    Comment   --               Daron Dotson MD  [address]  [address]  [facility] [address] [phone]      As needed    Aditya Farmer MD  [address]  [facility] [address] [phone]      Orthopedic " neurosurgical spine specialist.         Medication List        New Prescriptions      lidocaine 5 %  Commonly known as: LIDODERM  Place 1 patch on the skin as directed by provider Daily. Remove & Discard patch within 12 hours or as directed by MD               Where to Get Your Medications        These medications were sent to [facility] - [place], [place] - [address] - [phone]  - [phone] [address] [address]      Phone: [phone]   lidocaine 5 %            Chauncey Issa, APRN  04/28/25 3194

## 2025-06-27 ENCOUNTER — TRANSCRIBE ORDERS (OUTPATIENT)
Dept: ADMINISTRATIVE | Facility: HOSPITAL | Age: 80
End: 2025-06-27
Payer: MEDICARE

## 2025-06-27 DIAGNOSIS — Z12.31 OTHER SCREENING MAMMOGRAM: Primary | ICD-10-CM

## 2025-08-05 ENCOUNTER — APPOINTMENT (OUTPATIENT)
Dept: GENERAL RADIOLOGY | Facility: HOSPITAL | Age: 80
DRG: 689 | End: 2025-08-05
Payer: MEDICARE

## 2025-08-05 ENCOUNTER — APPOINTMENT (OUTPATIENT)
Dept: CT IMAGING | Facility: HOSPITAL | Age: 80
DRG: 689 | End: 2025-08-05
Payer: MEDICARE

## 2025-08-05 ENCOUNTER — HOSPITAL ENCOUNTER (INPATIENT)
Facility: HOSPITAL | Age: 80
LOS: 1 days | Discharge: HOME | DRG: 689 | End: 2025-08-07
Attending: EMERGENCY MEDICINE | Admitting: FAMILY MEDICINE
Payer: MEDICARE

## 2025-08-05 DIAGNOSIS — Z86.73 PERSONAL HISTORY OF TIA (TRANSIENT ISCHEMIC ATTACK): ICD-10-CM

## 2025-08-05 DIAGNOSIS — N30.01 ACUTE CYSTITIS WITH HEMATURIA: ICD-10-CM

## 2025-08-05 DIAGNOSIS — Z86.73 HISTORY OF STROKE: ICD-10-CM

## 2025-08-05 DIAGNOSIS — R47.01 APHASIA: ICD-10-CM

## 2025-08-05 DIAGNOSIS — E10.69 TYPE 1 DIABETES MELLITUS WITH OTHER SPECIFIED COMPLICATION: ICD-10-CM

## 2025-08-05 DIAGNOSIS — R41.841 COGNITIVE COMMUNICATION DEFICIT: ICD-10-CM

## 2025-08-05 DIAGNOSIS — R47.81 SLURRED SPEECH: ICD-10-CM

## 2025-08-05 DIAGNOSIS — I25.10 CORONARY ARTERY DISEASE INVOLVING NATIVE CORONARY ARTERY OF NATIVE HEART WITHOUT ANGINA PECTORIS: ICD-10-CM

## 2025-08-05 DIAGNOSIS — I16.1 HYPERTENSIVE EMERGENCY WITHOUT CONGESTIVE HEART FAILURE: Primary | ICD-10-CM

## 2025-08-05 DIAGNOSIS — R47.1 DYSARTHRIA: ICD-10-CM

## 2025-08-05 LAB
ALT SERPL W P-5'-P-CCNC: 8 U/L (ref 1–33)
AMPHET+METHAMPHET UR QL: NEGATIVE
AMPHETAMINES UR QL: NEGATIVE
APTT PPP: 30.4 SECONDS (ref 22–39)
AST SERPL-CCNC: 15 U/L (ref 1–32)
BACTERIA UR QL AUTO: ABNORMAL /HPF
BARBITURATES UR QL SCN: NEGATIVE
BASOPHILS # BLD AUTO: 0.05 10*3/MM3 (ref 0–0.2)
BASOPHILS NFR BLD AUTO: 0.8 % (ref 0–1.5)
BENZODIAZ UR QL SCN: NEGATIVE
BILIRUB UR QL STRIP: NEGATIVE
BUN BLDA-MCNC: 25 MG/DL (ref 8–26)
BUPRENORPHINE SERPL-MCNC: NEGATIVE NG/ML
CA-I BLDA-SCNC: 1.2 MMOL/L (ref 1.2–1.32)
CANNABINOIDS SERPL QL: NEGATIVE
CHLORIDE BLDA-SCNC: 100 MMOL/L (ref 98–109)
CLARITY UR: CLEAR
CO2 BLDA-SCNC: 26 MMOL/L (ref 24–29)
COCAINE UR QL: NEGATIVE
COLOR UR: YELLOW
CREAT BLDA-MCNC: 1 MG/DL (ref 0.6–1.3)
DEPRECATED RDW RBC AUTO: 39.8 FL (ref 37–54)
EGFRCR SERPLBLD CKD-EPI 2021: 57.1 ML/MIN/1.73
EOSINOPHIL # BLD AUTO: 0.11 10*3/MM3 (ref 0–0.4)
EOSINOPHIL NFR BLD AUTO: 1.8 % (ref 0.3–6.2)
ERYTHROCYTE [DISTWIDTH] IN BLOOD BY AUTOMATED COUNT: 12.8 % (ref 12.3–15.4)
ETHANOL BLD-MCNC: <10 MG/DL (ref 0–10)
FENTANYL UR-MCNC: NEGATIVE NG/ML
GLUCOSE BLDC GLUCOMTR-MCNC: 102 MG/DL (ref 70–130)
GLUCOSE UR STRIP-MCNC: NEGATIVE MG/DL
HCT VFR BLD AUTO: 37.5 % (ref 34–46.6)
HCT VFR BLDA CALC: 36 % (ref 38–51)
HGB BLD-MCNC: 12.5 G/DL (ref 12–15.9)
HGB BLDA-MCNC: 12.2 G/DL (ref 12–17)
HGB UR QL STRIP.AUTO: NEGATIVE
HOLD SPECIMEN: NORMAL
HYALINE CASTS UR QL AUTO: ABNORMAL /LPF
IMM GRANULOCYTES # BLD AUTO: 0.03 10*3/MM3 (ref 0–0.05)
IMM GRANULOCYTES NFR BLD AUTO: 0.5 % (ref 0–0.5)
INR PPP: 1.01 (ref 0.89–1.12)
KETONES UR QL STRIP: NEGATIVE
LEUKOCYTE ESTERASE UR QL STRIP.AUTO: ABNORMAL
LYMPHOCYTES # BLD AUTO: 1.01 10*3/MM3 (ref 0.7–3.1)
LYMPHOCYTES NFR BLD AUTO: 16.8 % (ref 19.6–45.3)
MCH RBC QN AUTO: 28.4 PG (ref 26.6–33)
MCHC RBC AUTO-ENTMCNC: 33.3 G/DL (ref 31.5–35.7)
MCV RBC AUTO: 85.2 FL (ref 79–97)
METHADONE UR QL SCN: NEGATIVE
MONOCYTES # BLD AUTO: 0.49 10*3/MM3 (ref 0.1–0.9)
MONOCYTES NFR BLD AUTO: 8.2 % (ref 5–12)
NEUTROPHILS NFR BLD AUTO: 4.32 10*3/MM3 (ref 1.7–7)
NEUTROPHILS NFR BLD AUTO: 71.9 % (ref 42.7–76)
NITRITE UR QL STRIP: NEGATIVE
NRBC BLD AUTO-RTO: 0 /100 WBC (ref 0–0.2)
OPIATES UR QL: NEGATIVE
OXYCODONE UR QL SCN: NEGATIVE
PCP UR QL SCN: NEGATIVE
PH UR STRIP.AUTO: 7 [PH] (ref 5–8)
PLATELET # BLD AUTO: 149 10*3/MM3 (ref 140–450)
PMV BLD AUTO: 10.2 FL (ref 6–12)
POTASSIUM BLDA-SCNC: 4 MMOL/L (ref 3.5–4.9)
PROT UR QL STRIP: NEGATIVE
PROTHROMBIN TIME: 14 SECONDS (ref 12.2–15.3)
RBC # BLD AUTO: 4.4 10*6/MM3 (ref 3.77–5.28)
RBC # UR STRIP: ABNORMAL /HPF
REF LAB TEST METHOD: ABNORMAL
SODIUM BLD-SCNC: 139 MMOL/L (ref 138–146)
SP GR UR STRIP: >1.03 (ref 1–1.03)
SQUAMOUS #/AREA URNS HPF: ABNORMAL /HPF
TRICYCLICS UR QL SCN: NEGATIVE
UROBILINOGEN UR QL STRIP: ABNORMAL
WBC # UR STRIP: ABNORMAL /HPF
WBC NRBC COR # BLD AUTO: 6.01 10*3/MM3 (ref 3.4–10.8)
WHOLE BLOOD HOLD COAG: NORMAL
WHOLE BLOOD HOLD SPECIMEN: NORMAL

## 2025-08-05 PROCEDURE — 85014 HEMATOCRIT: CPT

## 2025-08-05 PROCEDURE — 87186 SC STD MICRODIL/AGAR DIL: CPT | Performed by: EMERGENCY MEDICINE

## 2025-08-05 PROCEDURE — 81001 URINALYSIS AUTO W/SCOPE: CPT | Performed by: EMERGENCY MEDICINE

## 2025-08-05 PROCEDURE — 85025 COMPLETE CBC W/AUTO DIFF WBC: CPT | Performed by: EMERGENCY MEDICINE

## 2025-08-05 PROCEDURE — 87086 URINE CULTURE/COLONY COUNT: CPT | Performed by: EMERGENCY MEDICINE

## 2025-08-05 PROCEDURE — 0042T HC CT CEREBRAL PERFUSION W/WO CONTRAST: CPT

## 2025-08-05 PROCEDURE — 85730 THROMBOPLASTIN TIME PARTIAL: CPT | Performed by: EMERGENCY MEDICINE

## 2025-08-05 PROCEDURE — 84460 ALANINE AMINO (ALT) (SGPT): CPT | Performed by: EMERGENCY MEDICINE

## 2025-08-05 PROCEDURE — 82077 ASSAY SPEC XCP UR&BREATH IA: CPT | Performed by: EMERGENCY MEDICINE

## 2025-08-05 PROCEDURE — 99222 1ST HOSP IP/OBS MODERATE 55: CPT

## 2025-08-05 PROCEDURE — 70498 CT ANGIOGRAPHY NECK: CPT

## 2025-08-05 PROCEDURE — 85610 PROTHROMBIN TIME: CPT | Performed by: EMERGENCY MEDICINE

## 2025-08-05 PROCEDURE — 80047 BASIC METABLC PNL IONIZED CA: CPT

## 2025-08-05 PROCEDURE — 93005 ELECTROCARDIOGRAM TRACING: CPT | Performed by: EMERGENCY MEDICINE

## 2025-08-05 PROCEDURE — 25010000002 HYDRALAZINE PER 20 MG: Performed by: EMERGENCY MEDICINE

## 2025-08-05 PROCEDURE — 84450 TRANSFERASE (AST) (SGOT): CPT | Performed by: EMERGENCY MEDICINE

## 2025-08-05 PROCEDURE — 25510000001 IOPAMIDOL PER 1 ML: Performed by: EMERGENCY MEDICINE

## 2025-08-05 PROCEDURE — 99291 CRITICAL CARE FIRST HOUR: CPT

## 2025-08-05 PROCEDURE — 80307 DRUG TEST PRSMV CHEM ANLYZR: CPT | Performed by: EMERGENCY MEDICINE

## 2025-08-05 PROCEDURE — 70450 CT HEAD/BRAIN W/O DYE: CPT

## 2025-08-05 PROCEDURE — 4A03X5D MEASUREMENT OF ARTERIAL FLOW, INTRACRANIAL, EXTERNAL APPROACH: ICD-10-PCS | Performed by: EMERGENCY MEDICINE

## 2025-08-05 PROCEDURE — 87077 CULTURE AEROBIC IDENTIFY: CPT | Performed by: EMERGENCY MEDICINE

## 2025-08-05 PROCEDURE — 25010000002 CEFTRIAXONE PER 250 MG: Performed by: EMERGENCY MEDICINE

## 2025-08-05 PROCEDURE — 70496 CT ANGIOGRAPHY HEAD: CPT

## 2025-08-05 PROCEDURE — 71045 X-RAY EXAM CHEST 1 VIEW: CPT

## 2025-08-05 RX ORDER — IOPAMIDOL 755 MG/ML
100 INJECTION, SOLUTION INTRAVASCULAR
Status: COMPLETED | OUTPATIENT
Start: 2025-08-05 | End: 2025-08-05

## 2025-08-05 RX ORDER — CLONIDINE HYDROCHLORIDE 0.1 MG/1
0.2 TABLET ORAL ONCE
Status: COMPLETED | OUTPATIENT
Start: 2025-08-05 | End: 2025-08-05

## 2025-08-05 RX ORDER — SODIUM CHLORIDE 0.9 % (FLUSH) 0.9 %
10 SYRINGE (ML) INJECTION AS NEEDED
Status: DISCONTINUED | OUTPATIENT
Start: 2025-08-05 | End: 2025-08-07 | Stop reason: HOSPADM

## 2025-08-05 RX ORDER — HYDRALAZINE HYDROCHLORIDE 20 MG/ML
10 INJECTION INTRAMUSCULAR; INTRAVENOUS ONCE
Status: COMPLETED | OUTPATIENT
Start: 2025-08-05 | End: 2025-08-05

## 2025-08-05 RX ADMIN — CEFTRIAXONE SODIUM 1000 MG: 1 INJECTION, POWDER, FOR SOLUTION INTRAMUSCULAR; INTRAVENOUS at 23:54

## 2025-08-05 RX ADMIN — IOPAMIDOL 120 ML: 755 INJECTION, SOLUTION INTRAVENOUS at 22:05

## 2025-08-05 RX ADMIN — HYDRALAZINE HYDROCHLORIDE 10 MG: 20 INJECTION INTRAMUSCULAR; INTRAVENOUS at 22:33

## 2025-08-05 RX ADMIN — CLONIDINE HYDROCHLORIDE 0.2 MG: 0.1 TABLET ORAL at 23:54

## 2025-08-06 ENCOUNTER — APPOINTMENT (OUTPATIENT)
Dept: CARDIOLOGY | Facility: HOSPITAL | Age: 80
DRG: 689 | End: 2025-08-06
Payer: MEDICARE

## 2025-08-06 ENCOUNTER — APPOINTMENT (OUTPATIENT)
Dept: NEUROLOGY | Facility: HOSPITAL | Age: 80
DRG: 689 | End: 2025-08-06
Payer: MEDICARE

## 2025-08-06 ENCOUNTER — APPOINTMENT (OUTPATIENT)
Dept: GENERAL RADIOLOGY | Facility: HOSPITAL | Age: 80
DRG: 689 | End: 2025-08-06
Payer: MEDICARE

## 2025-08-06 PROBLEM — R47.81 SLURRED SPEECH: Status: ACTIVE | Noted: 2025-08-06

## 2025-08-06 LAB
ALBUMIN SERPL-MCNC: 4 G/DL (ref 3.5–5.2)
ALBUMIN/GLOB SERPL: 1.4 G/DL
ALP SERPL-CCNC: 65 U/L (ref 39–117)
ALT SERPL W P-5'-P-CCNC: 7 U/L (ref 1–33)
ANION GAP SERPL CALCULATED.3IONS-SCNC: 15 MMOL/L (ref 5–15)
AST SERPL-CCNC: 14 U/L (ref 1–32)
BASOPHILS # BLD AUTO: 0.04 10*3/MM3 (ref 0–0.2)
BASOPHILS NFR BLD AUTO: 0.5 % (ref 0–1.5)
BILIRUB SERPL-MCNC: 0.4 MG/DL (ref 0–1.2)
BUN SERPL-MCNC: 16.7 MG/DL (ref 8–23)
BUN/CREAT SERPL: 24.2 (ref 7–25)
CALCIUM SPEC-SCNC: 8.8 MG/DL (ref 8.6–10.5)
CHLORIDE SERPL-SCNC: 96 MMOL/L (ref 98–107)
CHOLEST SERPL-MCNC: 173 MG/DL (ref 0–200)
CO2 SERPL-SCNC: 23 MMOL/L (ref 22–29)
CREAT SERPL-MCNC: 0.69 MG/DL (ref 0.57–1)
DEPRECATED RDW RBC AUTO: 41.6 FL (ref 37–54)
EGFRCR SERPLBLD CKD-EPI 2021: 87.9 ML/MIN/1.73
EOSINOPHIL # BLD AUTO: 0.01 10*3/MM3 (ref 0–0.4)
EOSINOPHIL NFR BLD AUTO: 0.1 % (ref 0.3–6.2)
ERYTHROCYTE [DISTWIDTH] IN BLOOD BY AUTOMATED COUNT: 12.8 % (ref 12.3–15.4)
GLOBULIN UR ELPH-MCNC: 2.8 GM/DL
GLUCOSE BLDC GLUCOMTR-MCNC: 158 MG/DL (ref 70–130)
GLUCOSE BLDC GLUCOMTR-MCNC: 183 MG/DL (ref 70–130)
GLUCOSE BLDC GLUCOMTR-MCNC: 183 MG/DL (ref 70–130)
GLUCOSE BLDC GLUCOMTR-MCNC: 199 MG/DL (ref 70–130)
GLUCOSE BLDC GLUCOMTR-MCNC: 234 MG/DL (ref 70–130)
GLUCOSE SERPL-MCNC: 223 MG/DL (ref 65–99)
HBA1C MFR BLD: 7.12 % (ref 4.8–5.6)
HCT VFR BLD AUTO: 39 % (ref 34–46.6)
HDLC SERPL-MCNC: 70 MG/DL (ref 40–60)
HGB BLD-MCNC: 12.3 G/DL (ref 12–15.9)
IMM GRANULOCYTES # BLD AUTO: 0.03 10*3/MM3 (ref 0–0.05)
IMM GRANULOCYTES NFR BLD AUTO: 0.4 % (ref 0–0.5)
LDLC SERPL CALC-MCNC: 82 MG/DL (ref 0–100)
LDLC/HDLC SERPL: 1.12 {RATIO}
LYMPHOCYTES # BLD AUTO: 0.51 10*3/MM3 (ref 0.7–3.1)
LYMPHOCYTES NFR BLD AUTO: 6.6 % (ref 19.6–45.3)
MAGNESIUM SERPL-MCNC: 1.5 MG/DL (ref 1.6–2.4)
MCH RBC QN AUTO: 28 PG (ref 26.6–33)
MCHC RBC AUTO-ENTMCNC: 31.5 G/DL (ref 31.5–35.7)
MCV RBC AUTO: 88.6 FL (ref 79–97)
MONOCYTES # BLD AUTO: 0.18 10*3/MM3 (ref 0.1–0.9)
MONOCYTES NFR BLD AUTO: 2.3 % (ref 5–12)
NEUTROPHILS NFR BLD AUTO: 6.96 10*3/MM3 (ref 1.7–7)
NEUTROPHILS NFR BLD AUTO: 90.1 % (ref 42.7–76)
NRBC BLD AUTO-RTO: 0 /100 WBC (ref 0–0.2)
PA ADP PRP-ACNC: 191 PRU
PLATELET # BLD AUTO: 146 10*3/MM3 (ref 140–450)
PMV BLD AUTO: 10.7 FL (ref 6–12)
POTASSIUM SERPL-SCNC: 3.7 MMOL/L (ref 3.5–5.2)
PROT SERPL-MCNC: 6.8 G/DL (ref 6–8.5)
RBC # BLD AUTO: 4.4 10*6/MM3 (ref 3.77–5.28)
SODIUM SERPL-SCNC: 134 MMOL/L (ref 136–145)
TRIGL SERPL-MCNC: 122 MG/DL (ref 0–150)
VLDLC SERPL-MCNC: 21 MG/DL (ref 5–40)
WBC NRBC COR # BLD AUTO: 7.73 10*3/MM3 (ref 3.4–10.8)

## 2025-08-06 PROCEDURE — 25010000002 ONDANSETRON PER 1 MG: Performed by: INTERNAL MEDICINE

## 2025-08-06 PROCEDURE — 63710000001 INSULIN LISPRO (HUMAN) PER 5 UNITS: Performed by: INTERNAL MEDICINE

## 2025-08-06 PROCEDURE — 82948 REAGENT STRIP/BLOOD GLUCOSE: CPT

## 2025-08-06 PROCEDURE — 80053 COMPREHEN METABOLIC PANEL: CPT | Performed by: INTERNAL MEDICINE

## 2025-08-06 PROCEDURE — 93306 TTE W/DOPPLER COMPLETE: CPT

## 2025-08-06 PROCEDURE — 97161 PT EVAL LOW COMPLEX 20 MIN: CPT

## 2025-08-06 PROCEDURE — 95816 EEG AWAKE AND DROWSY: CPT | Performed by: PSYCHIATRY & NEUROLOGY

## 2025-08-06 PROCEDURE — 97116 GAIT TRAINING THERAPY: CPT

## 2025-08-06 PROCEDURE — 92523 SPEECH SOUND LANG COMPREHEN: CPT

## 2025-08-06 PROCEDURE — 93306 TTE W/DOPPLER COMPLETE: CPT | Performed by: INTERNAL MEDICINE

## 2025-08-06 PROCEDURE — 97165 OT EVAL LOW COMPLEX 30 MIN: CPT

## 2025-08-06 PROCEDURE — 25010000002 MAGNESIUM SULFATE 2 GM/50ML SOLUTION: Performed by: FAMILY MEDICINE

## 2025-08-06 PROCEDURE — 83735 ASSAY OF MAGNESIUM: CPT | Performed by: INTERNAL MEDICINE

## 2025-08-06 PROCEDURE — 74018 RADEX ABDOMEN 1 VIEW: CPT

## 2025-08-06 PROCEDURE — 99233 SBSQ HOSP IP/OBS HIGH 50: CPT | Performed by: STUDENT IN AN ORGANIZED HEALTH CARE EDUCATION/TRAINING PROGRAM

## 2025-08-06 PROCEDURE — 80061 LIPID PANEL: CPT

## 2025-08-06 PROCEDURE — 95816 EEG AWAKE AND DROWSY: CPT

## 2025-08-06 PROCEDURE — 83036 HEMOGLOBIN GLYCOSYLATED A1C: CPT

## 2025-08-06 PROCEDURE — 85576 BLOOD PLATELET AGGREGATION: CPT

## 2025-08-06 PROCEDURE — 85025 COMPLETE CBC W/AUTO DIFF WBC: CPT | Performed by: INTERNAL MEDICINE

## 2025-08-06 PROCEDURE — 25010000002 MORPHINE PER 10 MG: Performed by: INTERNAL MEDICINE

## 2025-08-06 PROCEDURE — 99223 1ST HOSP IP/OBS HIGH 75: CPT | Performed by: INTERNAL MEDICINE

## 2025-08-06 RX ORDER — VALSARTAN 160 MG/1
80 TABLET ORAL
Status: CANCELLED | OUTPATIENT
Start: 2025-08-06

## 2025-08-06 RX ORDER — ROSUVASTATIN CALCIUM 20 MG/1
20 TABLET, COATED ORAL NIGHTLY
Status: DISCONTINUED | OUTPATIENT
Start: 2025-08-06 | End: 2025-08-07

## 2025-08-06 RX ORDER — ASPIRIN 81 MG/1
81 TABLET, CHEWABLE ORAL DAILY
Status: DISCONTINUED | OUTPATIENT
Start: 2025-08-06 | End: 2025-08-07 | Stop reason: HOSPADM

## 2025-08-06 RX ORDER — ACETAMINOPHEN 160 MG/5ML
650 SOLUTION ORAL EVERY 4 HOURS PRN
Status: DISCONTINUED | OUTPATIENT
Start: 2025-08-06 | End: 2025-08-07 | Stop reason: HOSPADM

## 2025-08-06 RX ORDER — CLOPIDOGREL BISULFATE 75 MG/1
75 TABLET ORAL DAILY
Status: DISCONTINUED | OUTPATIENT
Start: 2025-08-06 | End: 2025-08-07 | Stop reason: HOSPADM

## 2025-08-06 RX ORDER — ACETAMINOPHEN 650 MG/1
650 SUPPOSITORY RECTAL EVERY 4 HOURS PRN
Status: DISCONTINUED | OUTPATIENT
Start: 2025-08-06 | End: 2025-08-07 | Stop reason: HOSPADM

## 2025-08-06 RX ORDER — PANTOPRAZOLE SODIUM 40 MG/1
40 TABLET, DELAYED RELEASE ORAL
Status: DISCONTINUED | OUTPATIENT
Start: 2025-08-06 | End: 2025-08-07 | Stop reason: HOSPADM

## 2025-08-06 RX ORDER — SODIUM CHLORIDE 9 MG/ML
40 INJECTION, SOLUTION INTRAVENOUS AS NEEDED
Status: DISCONTINUED | OUTPATIENT
Start: 2025-08-06 | End: 2025-08-07 | Stop reason: HOSPADM

## 2025-08-06 RX ORDER — NALOXONE HCL 0.4 MG/ML
0.4 VIAL (ML) INJECTION
Status: DISCONTINUED | OUTPATIENT
Start: 2025-08-06 | End: 2025-08-07 | Stop reason: HOSPADM

## 2025-08-06 RX ORDER — ACETAMINOPHEN 325 MG/1
650 TABLET ORAL EVERY 4 HOURS PRN
Status: DISCONTINUED | OUTPATIENT
Start: 2025-08-06 | End: 2025-08-07 | Stop reason: HOSPADM

## 2025-08-06 RX ORDER — IBUPROFEN 600 MG/1
1 TABLET ORAL
Status: DISCONTINUED | OUTPATIENT
Start: 2025-08-06 | End: 2025-08-07 | Stop reason: HOSPADM

## 2025-08-06 RX ORDER — MAGNESIUM SULFATE HEPTAHYDRATE 40 MG/ML
2 INJECTION, SOLUTION INTRAVENOUS
Status: COMPLETED | OUTPATIENT
Start: 2025-08-06 | End: 2025-08-06

## 2025-08-06 RX ORDER — INSULIN LISPRO 100 [IU]/ML
2-7 INJECTION, SOLUTION INTRAVENOUS; SUBCUTANEOUS
Status: DISCONTINUED | OUTPATIENT
Start: 2025-08-06 | End: 2025-08-07 | Stop reason: HOSPADM

## 2025-08-06 RX ORDER — SODIUM CHLORIDE 0.9 % (FLUSH) 0.9 %
10 SYRINGE (ML) INJECTION AS NEEDED
Status: DISCONTINUED | OUTPATIENT
Start: 2025-08-06 | End: 2025-08-07 | Stop reason: HOSPADM

## 2025-08-06 RX ORDER — NICOTINE POLACRILEX 4 MG
15 LOZENGE BUCCAL
Status: DISCONTINUED | OUTPATIENT
Start: 2025-08-06 | End: 2025-08-07 | Stop reason: HOSPADM

## 2025-08-06 RX ORDER — METOPROLOL TARTRATE 50 MG
50 TABLET ORAL 2 TIMES DAILY
Status: CANCELLED | OUTPATIENT
Start: 2025-08-06

## 2025-08-06 RX ORDER — MIDAZOLAM HYDROCHLORIDE 1 MG/ML
1 INJECTION, SOLUTION INTRAMUSCULAR; INTRAVENOUS ONCE
Status: COMPLETED | OUTPATIENT
Start: 2025-08-06 | End: 2025-08-07

## 2025-08-06 RX ORDER — SODIUM CHLORIDE 0.9 % (FLUSH) 0.9 %
10 SYRINGE (ML) INJECTION EVERY 12 HOURS SCHEDULED
Status: DISCONTINUED | OUTPATIENT
Start: 2025-08-06 | End: 2025-08-07 | Stop reason: HOSPADM

## 2025-08-06 RX ORDER — ASPIRIN 300 MG/1
300 SUPPOSITORY RECTAL DAILY
Status: DISCONTINUED | OUTPATIENT
Start: 2025-08-06 | End: 2025-08-07 | Stop reason: HOSPADM

## 2025-08-06 RX ORDER — DEXTROSE MONOHYDRATE 25 G/50ML
25 INJECTION, SOLUTION INTRAVENOUS
Status: DISCONTINUED | OUTPATIENT
Start: 2025-08-06 | End: 2025-08-07 | Stop reason: HOSPADM

## 2025-08-06 RX ORDER — ONDANSETRON 2 MG/ML
4 INJECTION INTRAMUSCULAR; INTRAVENOUS EVERY 6 HOURS PRN
Status: DISCONTINUED | OUTPATIENT
Start: 2025-08-06 | End: 2025-08-07 | Stop reason: HOSPADM

## 2025-08-06 RX ORDER — CLOPIDOGREL BISULFATE 75 MG/1
75 TABLET ORAL DAILY
Status: DISCONTINUED | OUTPATIENT
Start: 2025-08-06 | End: 2025-08-06 | Stop reason: SDUPTHER

## 2025-08-06 RX ORDER — LABETALOL HYDROCHLORIDE 5 MG/ML
10 INJECTION, SOLUTION INTRAVENOUS EVERY 4 HOURS PRN
Status: DISCONTINUED | OUTPATIENT
Start: 2025-08-06 | End: 2025-08-07 | Stop reason: HOSPADM

## 2025-08-06 RX ORDER — MORPHINE SULFATE 2 MG/ML
2 INJECTION, SOLUTION INTRAMUSCULAR; INTRAVENOUS EVERY 4 HOURS PRN
Status: DISCONTINUED | OUTPATIENT
Start: 2025-08-06 | End: 2025-08-07 | Stop reason: HOSPADM

## 2025-08-06 RX ORDER — NITROGLYCERIN 0.4 MG/1
0.4 TABLET SUBLINGUAL
Status: DISCONTINUED | OUTPATIENT
Start: 2025-08-06 | End: 2025-08-07 | Stop reason: HOSPADM

## 2025-08-06 RX ORDER — LORAZEPAM 1 MG/1
2 TABLET ORAL 3 TIMES DAILY PRN
Status: DISCONTINUED | OUTPATIENT
Start: 2025-08-06 | End: 2025-08-07 | Stop reason: HOSPADM

## 2025-08-06 RX ORDER — HYDROCODONE BITARTRATE AND ACETAMINOPHEN 5; 325 MG/1; MG/1
1 TABLET ORAL EVERY 6 HOURS PRN
Refills: 0 | Status: DISCONTINUED | OUTPATIENT
Start: 2025-08-06 | End: 2025-08-07 | Stop reason: HOSPADM

## 2025-08-06 RX ADMIN — ASPIRIN 81 MG: 81 TABLET, CHEWABLE ORAL at 09:01

## 2025-08-06 RX ADMIN — Medication 10 ML: at 01:07

## 2025-08-06 RX ADMIN — Medication 10 ML: at 22:16

## 2025-08-06 RX ADMIN — MAGNESIUM SULFATE HEPTAHYDRATE 2 G: 40 INJECTION, SOLUTION INTRAVENOUS at 13:15

## 2025-08-06 RX ADMIN — Medication 5 MG: at 22:16

## 2025-08-06 RX ADMIN — INSULIN LISPRO 2 UNITS: 100 INJECTION, SOLUTION INTRAVENOUS; SUBCUTANEOUS at 17:58

## 2025-08-06 RX ADMIN — ROSUVASTATIN 20 MG: 20 TABLET, FILM COATED ORAL at 22:16

## 2025-08-06 RX ADMIN — Medication 10 ML: at 09:02

## 2025-08-06 RX ADMIN — INSULIN LISPRO 3 UNITS: 100 INJECTION, SOLUTION INTRAVENOUS; SUBCUTANEOUS at 11:37

## 2025-08-06 RX ADMIN — ONDANSETRON 4 MG: 2 INJECTION INTRAMUSCULAR; INTRAVENOUS at 03:57

## 2025-08-06 RX ADMIN — MORPHINE SULFATE 2 MG: 2 INJECTION, SOLUTION INTRAMUSCULAR; INTRAVENOUS at 03:57

## 2025-08-06 RX ADMIN — HYDROCODONE BITARTRATE AND ACETAMINOPHEN 1 TABLET: 5; 325 TABLET ORAL at 14:19

## 2025-08-06 RX ADMIN — CLOPIDOGREL BISULFATE 75 MG: 75 TABLET, FILM COATED ORAL at 09:00

## 2025-08-06 RX ADMIN — HYDROCODONE BITARTRATE AND ACETAMINOPHEN 1 TABLET: 5; 325 TABLET ORAL at 02:33

## 2025-08-06 RX ADMIN — MAGNESIUM SULFATE HEPTAHYDRATE 2 G: 40 INJECTION, SOLUTION INTRAVENOUS at 11:37

## 2025-08-06 RX ADMIN — MAGNESIUM SULFATE HEPTAHYDRATE 2 G: 40 INJECTION, SOLUTION INTRAVENOUS at 09:01

## 2025-08-06 RX ADMIN — PANTOPRAZOLE SODIUM 40 MG: 40 TABLET, DELAYED RELEASE ORAL at 09:01

## 2025-08-06 RX ADMIN — INSULIN LISPRO 3 UNITS: 100 INJECTION, SOLUTION INTRAVENOUS; SUBCUTANEOUS at 09:01

## 2025-08-06 RX ADMIN — INSULIN LISPRO 2 UNITS: 100 INJECTION, SOLUTION INTRAVENOUS; SUBCUTANEOUS at 22:16

## 2025-08-06 RX ADMIN — Medication 10 MG: at 03:13

## 2025-08-07 ENCOUNTER — APPOINTMENT (OUTPATIENT)
Dept: MRI IMAGING | Facility: HOSPITAL | Age: 80
DRG: 689 | End: 2025-08-07
Payer: MEDICARE

## 2025-08-07 ENCOUNTER — READMISSION MANAGEMENT (OUTPATIENT)
Dept: CALL CENTER | Facility: HOSPITAL | Age: 80
End: 2025-08-07
Payer: MEDICARE

## 2025-08-07 VITALS
HEIGHT: 64 IN | DIASTOLIC BLOOD PRESSURE: 69 MMHG | OXYGEN SATURATION: 94 % | WEIGHT: 167.77 LBS | HEART RATE: 65 BPM | RESPIRATION RATE: 14 BRPM | SYSTOLIC BLOOD PRESSURE: 120 MMHG | TEMPERATURE: 98.5 F | BODY MASS INDEX: 28.64 KG/M2

## 2025-08-07 PROBLEM — G93.41 METABOLIC ENCEPHALOPATHY: Status: ACTIVE | Noted: 2025-08-07

## 2025-08-07 LAB
ANION GAP SERPL CALCULATED.3IONS-SCNC: 13 MMOL/L (ref 5–15)
AORTIC DIMENSIONLESS INDEX: 0.77 (DI)
AV MEAN PRESS GRAD SYS DOP V1V2: 7 MMHG
AV VMAX SYS DOP: 180 CM/SEC
BASOPHILS # BLD AUTO: 0.03 10*3/MM3 (ref 0–0.2)
BASOPHILS NFR BLD AUTO: 0.5 % (ref 0–1.5)
BH CV ECHO MEAS - AO MAX PG: 13 MMHG
BH CV ECHO MEAS - AO ROOT DIAM: 2.1 CM
BH CV ECHO MEAS - AO V2 VTI: 31.6 CM
BH CV ECHO MEAS - AVA(I,D): 2.41 CM2
BH CV ECHO MEAS - EDV(CUBED): 68.9 ML
BH CV ECHO MEAS - EDV(MOD-SP2): 74.8 ML
BH CV ECHO MEAS - EDV(MOD-SP4): 74.9 ML
BH CV ECHO MEAS - EF(MOD-SP2): 63.5 %
BH CV ECHO MEAS - EF(MOD-SP4): 56.2 %
BH CV ECHO MEAS - ESV(CUBED): 6.9 ML
BH CV ECHO MEAS - ESV(MOD-SP2): 27.3 ML
BH CV ECHO MEAS - ESV(MOD-SP4): 32.8 ML
BH CV ECHO MEAS - FS: 53.7 %
BH CV ECHO MEAS - IVS/LVPW: 1 CM
BH CV ECHO MEAS - IVSD: 1.2 CM
BH CV ECHO MEAS - LA DIMENSION: 2.7 CM
BH CV ECHO MEAS - LAT PEAK E' VEL: 5.9 CM/SEC
BH CV ECHO MEAS - LV DIASTOLIC VOL/BSA (35-75): 41.3 CM2
BH CV ECHO MEAS - LV MASS(C)D: 171.7 GRAMS
BH CV ECHO MEAS - LV MAX PG: 8.8 MMHG
BH CV ECHO MEAS - LV MEAN PG: 4 MMHG
BH CV ECHO MEAS - LV SYSTOLIC VOL/BSA (12-30): 18.1 CM2
BH CV ECHO MEAS - LV V1 MAX: 148 CM/SEC
BH CV ECHO MEAS - LV V1 VTI: 24.2 CM
BH CV ECHO MEAS - LVIDD: 4.1 CM
BH CV ECHO MEAS - LVIDS: 1.9 CM
BH CV ECHO MEAS - LVOT AREA: 3.1 CM2
BH CV ECHO MEAS - LVOT DIAM: 2 CM
BH CV ECHO MEAS - LVPWD: 1.2 CM
BH CV ECHO MEAS - MED PEAK E' VEL: 5.3 CM/SEC
BH CV ECHO MEAS - MV A MAX VEL: 104 CM/SEC
BH CV ECHO MEAS - MV DEC SLOPE: 448 CM/SEC2
BH CV ECHO MEAS - MV DEC TIME: 0.19 SEC
BH CV ECHO MEAS - MV E MAX VEL: 84.3 CM/SEC
BH CV ECHO MEAS - MV E/A: 0.81
BH CV ECHO MEAS - MV MAX PG: 5.5 MMHG
BH CV ECHO MEAS - MV MEAN PG: 2 MMHG
BH CV ECHO MEAS - MV P1/2T: 57.7 MSEC
BH CV ECHO MEAS - MV V2 VTI: 22.7 CM
BH CV ECHO MEAS - MVA(P1/2T): 3.8 CM2
BH CV ECHO MEAS - MVA(VTI): 3.3 CM2
BH CV ECHO MEAS - PA ACC TIME: 0.11 SEC
BH CV ECHO MEAS - SV(LVOT): 76 ML
BH CV ECHO MEAS - SV(MOD-SP2): 47.5 ML
BH CV ECHO MEAS - SV(MOD-SP4): 42.1 ML
BH CV ECHO MEAS - SVI(LVOT): 42 ML/M2
BH CV ECHO MEAS - SVI(MOD-SP2): 26.2 ML/M2
BH CV ECHO MEAS - SVI(MOD-SP4): 23.2 ML/M2
BH CV ECHO MEAS - TAPSE (>1.6): 2.18 CM
BH CV ECHO MEASUREMENTS AVERAGE E/E' RATIO: 15.05
BH CV XLRA - RV BASE: 3.4 CM
BH CV XLRA - RV LENGTH: 6.3 CM
BH CV XLRA - RV MID: 2.7 CM
BH CV XLRA - TDI S': 15.7 CM/SEC
BUN SERPL-MCNC: 19.9 MG/DL (ref 8–23)
BUN/CREAT SERPL: 22.9 (ref 7–25)
CALCIUM SPEC-SCNC: 8.9 MG/DL (ref 8.6–10.5)
CHLORIDE SERPL-SCNC: 97 MMOL/L (ref 98–107)
CO2 SERPL-SCNC: 26 MMOL/L (ref 22–29)
CREAT SERPL-MCNC: 0.87 MG/DL (ref 0.57–1)
DEPRECATED RDW RBC AUTO: 40.7 FL (ref 37–54)
EGFRCR SERPLBLD CKD-EPI 2021: 67.4 ML/MIN/1.73
EOSINOPHIL # BLD AUTO: 0.06 10*3/MM3 (ref 0–0.4)
EOSINOPHIL NFR BLD AUTO: 1 % (ref 0.3–6.2)
ERYTHROCYTE [DISTWIDTH] IN BLOOD BY AUTOMATED COUNT: 13 % (ref 12.3–15.4)
GLUCOSE BLDC GLUCOMTR-MCNC: 134 MG/DL (ref 70–130)
GLUCOSE BLDC GLUCOMTR-MCNC: 184 MG/DL (ref 70–130)
GLUCOSE SERPL-MCNC: 160 MG/DL (ref 65–99)
HCT VFR BLD AUTO: 37.7 % (ref 34–46.6)
HGB BLD-MCNC: 12.4 G/DL (ref 12–15.9)
IMM GRANULOCYTES # BLD AUTO: 0.03 10*3/MM3 (ref 0–0.05)
IMM GRANULOCYTES NFR BLD AUTO: 0.5 % (ref 0–0.5)
LEFT ATRIUM VOLUME INDEX: 11.5 ML/M2
LV EF 2D ECHO EST: 60 %
LV EF BIPLANE MOD: 61 %
LYMPHOCYTES # BLD AUTO: 0.88 10*3/MM3 (ref 0.7–3.1)
LYMPHOCYTES NFR BLD AUTO: 14.9 % (ref 19.6–45.3)
MAGNESIUM SERPL-MCNC: 2.4 MG/DL (ref 1.6–2.4)
MCH RBC QN AUTO: 28.3 PG (ref 26.6–33)
MCHC RBC AUTO-ENTMCNC: 32.9 G/DL (ref 31.5–35.7)
MCV RBC AUTO: 86.1 FL (ref 79–97)
MONOCYTES # BLD AUTO: 0.48 10*3/MM3 (ref 0.1–0.9)
MONOCYTES NFR BLD AUTO: 8.1 % (ref 5–12)
NEUTROPHILS NFR BLD AUTO: 4.44 10*3/MM3 (ref 1.7–7)
NEUTROPHILS NFR BLD AUTO: 75 % (ref 42.7–76)
NRBC BLD AUTO-RTO: 0 /100 WBC (ref 0–0.2)
PLATELET # BLD AUTO: 163 10*3/MM3 (ref 140–450)
PMV BLD AUTO: 10.7 FL (ref 6–12)
POTASSIUM SERPL-SCNC: 3.6 MMOL/L (ref 3.5–5.2)
RBC # BLD AUTO: 4.38 10*6/MM3 (ref 3.77–5.28)
SODIUM SERPL-SCNC: 136 MMOL/L (ref 136–145)
WBC NRBC COR # BLD AUTO: 5.92 10*3/MM3 (ref 3.4–10.8)

## 2025-08-07 PROCEDURE — 99232 SBSQ HOSP IP/OBS MODERATE 35: CPT | Performed by: NURSE PRACTITIONER

## 2025-08-07 PROCEDURE — 82948 REAGENT STRIP/BLOOD GLUCOSE: CPT

## 2025-08-07 PROCEDURE — 83735 ASSAY OF MAGNESIUM: CPT | Performed by: FAMILY MEDICINE

## 2025-08-07 PROCEDURE — 25010000002 MIDAZOLAM PER 1 MG: Performed by: FAMILY MEDICINE

## 2025-08-07 PROCEDURE — 85025 COMPLETE CBC W/AUTO DIFF WBC: CPT | Performed by: FAMILY MEDICINE

## 2025-08-07 PROCEDURE — 99239 HOSP IP/OBS DSCHRG MGMT >30: CPT | Performed by: FAMILY MEDICINE

## 2025-08-07 PROCEDURE — 70551 MRI BRAIN STEM W/O DYE: CPT

## 2025-08-07 PROCEDURE — 80048 BASIC METABOLIC PNL TOTAL CA: CPT | Performed by: FAMILY MEDICINE

## 2025-08-07 PROCEDURE — 63710000001 INSULIN LISPRO (HUMAN) PER 5 UNITS: Performed by: INTERNAL MEDICINE

## 2025-08-07 RX ORDER — CEFUROXIME AXETIL 250 MG/1
500 TABLET ORAL 2 TIMES DAILY
Status: DISCONTINUED | OUTPATIENT
Start: 2025-08-07 | End: 2025-08-07 | Stop reason: HOSPADM

## 2025-08-07 RX ORDER — METOPROLOL TARTRATE 25 MG/1
25 TABLET, FILM COATED ORAL EVERY 12 HOURS SCHEDULED
Status: DISCONTINUED | OUTPATIENT
Start: 2025-08-07 | End: 2025-08-07 | Stop reason: HOSPADM

## 2025-08-07 RX ORDER — ROSUVASTATIN CALCIUM 40 MG/1
40 TABLET, COATED ORAL NIGHTLY
Qty: 60 TABLET | Refills: 0 | Status: SHIPPED | OUTPATIENT
Start: 2025-08-07 | End: 2025-10-06

## 2025-08-07 RX ORDER — CEFUROXIME AXETIL 500 MG/1
500 TABLET ORAL 2 TIMES DAILY
Qty: 10 TABLET | Refills: 0 | Status: SHIPPED | OUTPATIENT
Start: 2025-08-07 | End: 2025-08-12

## 2025-08-07 RX ORDER — ROSUVASTATIN CALCIUM 20 MG/1
40 TABLET, COATED ORAL NIGHTLY
Status: DISCONTINUED | OUTPATIENT
Start: 2025-08-07 | End: 2025-08-07 | Stop reason: HOSPADM

## 2025-08-07 RX ORDER — POTASSIUM CHLORIDE 1500 MG/1
40 TABLET, EXTENDED RELEASE ORAL EVERY 4 HOURS
Status: DISCONTINUED | OUTPATIENT
Start: 2025-08-07 | End: 2025-08-07 | Stop reason: HOSPADM

## 2025-08-07 RX ADMIN — INSULIN LISPRO 2 UNITS: 100 INJECTION, SOLUTION INTRAVENOUS; SUBCUTANEOUS at 09:36

## 2025-08-07 RX ADMIN — MIDAZOLAM HYDROCHLORIDE 1 MG: 1 INJECTION, SOLUTION INTRAMUSCULAR; INTRAVENOUS at 00:24

## 2025-08-07 RX ADMIN — PANTOPRAZOLE SODIUM 40 MG: 40 TABLET, DELAYED RELEASE ORAL at 09:18

## 2025-08-07 RX ADMIN — ASPIRIN 81 MG: 81 TABLET, CHEWABLE ORAL at 09:18

## 2025-08-07 RX ADMIN — CLOPIDOGREL BISULFATE 75 MG: 75 TABLET, FILM COATED ORAL at 09:18

## 2025-08-07 RX ADMIN — METOPROLOL TARTRATE 25 MG: 25 TABLET, FILM COATED ORAL at 09:18

## 2025-08-07 RX ADMIN — Medication 10 ML: at 09:19

## 2025-08-07 RX ADMIN — POTASSIUM CHLORIDE 40 MEQ: 1500 TABLET, EXTENDED RELEASE ORAL at 09:19

## 2025-08-08 ENCOUNTER — NURSE TRIAGE (OUTPATIENT)
Dept: CALL CENTER | Facility: HOSPITAL | Age: 80
End: 2025-08-08
Payer: MEDICARE

## 2025-08-08 LAB
BACTERIA SPEC AEROBE CULT: ABNORMAL
QT INTERVAL: 406 MS
QTC INTERVAL: 418 MS

## 2025-08-13 ENCOUNTER — READMISSION MANAGEMENT (OUTPATIENT)
Dept: CALL CENTER | Facility: HOSPITAL | Age: 80
End: 2025-08-13
Payer: MEDICARE

## 2025-08-27 ENCOUNTER — READMISSION MANAGEMENT (OUTPATIENT)
Dept: CALL CENTER | Facility: HOSPITAL | Age: 80
End: 2025-08-27
Payer: MEDICARE

## (undated) DEVICE — DRSNG SURESITE WNDW 2.38X2.75

## (undated) DEVICE — MODEL BT2000 P/N 700287-012KIT CONTENTS: MANIFOLD WITH SALINE AND CONTRAST PORTS, SALINE TUBING WITH SPIKE AND HAND SYRINGE, TRANSDUCER: Brand: BT2000 AUTOMATED MANIFOLD KIT

## (undated) DEVICE — GLIDESHEATH SLENDER STAINLESS STEEL KIT: Brand: GLIDESHEATH SLENDER

## (undated) DEVICE — CATH DIAG EXPO M/ PK 5F FL4/FR4 PIG

## (undated) DEVICE — DEV COMP RAD PRELUDESYNC 24CM

## (undated) DEVICE — GW TPR/CORE CERBRL HEP HN .035 15X260

## (undated) DEVICE — CANNULA,ADULT,SOFT-TOUCH,7'TUBE,UC: Brand: PENDING

## (undated) DEVICE — CATH DIAG EXPO .045 FL3.5 5F 100CM

## (undated) DEVICE — MODEL AT P54, P/N 700608-035KIT CONTENTS: HAND CONTROLLER, 3-WAY HIGH-PRESSURE STOPCOCK WITH ROTATING END AND PREMIUM HIGH-PRESSURE TUBING: Brand: ANGIOTOUCH® KIT

## (undated) DEVICE — A2000 MULTI-USE SYRINGE KIT, P/N 701277-003KIT CONTENTS: 100ML CONTRAST RESERVOIR AND TUBING WITH CONTRAST SPIKE AND CLAMP: Brand: A2000 MULTI-USE SYRINGE KIT

## (undated) DEVICE — PK CATH CARD 10